# Patient Record
Sex: FEMALE | Race: WHITE | Employment: OTHER | URBAN - METROPOLITAN AREA
[De-identification: names, ages, dates, MRNs, and addresses within clinical notes are randomized per-mention and may not be internally consistent; named-entity substitution may affect disease eponyms.]

---

## 2017-07-29 ENCOUNTER — APPOINTMENT (OUTPATIENT)
Dept: CT IMAGING | Age: 82
End: 2017-07-29
Payer: MEDICARE

## 2017-07-29 ENCOUNTER — HOSPITAL ENCOUNTER (EMERGENCY)
Age: 82
Discharge: HOME | End: 2017-07-29
Attending: FAMILY MEDICINE
Payer: MEDICARE

## 2017-07-29 VITALS
RESPIRATION RATE: 16 BRPM | SYSTOLIC BLOOD PRESSURE: 148 MMHG | OXYGEN SATURATION: 97 % | BODY MASS INDEX: 25.76 KG/M2 | DIASTOLIC BLOOD PRESSURE: 71 MMHG | WEIGHT: 140 LBS | HEIGHT: 62 IN | TEMPERATURE: 98.2 F | HEART RATE: 72 BPM

## 2017-07-29 DIAGNOSIS — M47.816 OSTEOARTHRITIS OF LUMBAR SPINE, UNSPECIFIED SPINAL OSTEOARTHRITIS COMPLICATION STATUS: Primary | ICD-10-CM

## 2017-07-29 LAB
ALBUMIN SERPL-MCNC: 4 G/DL (ref 3.9–4.9)
ALP BLD-CCNC: 49 U/L (ref 40–130)
ALT SERPL-CCNC: 16 U/L (ref 0–33)
AMORPHOUS: ABNORMAL
ANION GAP SERPL CALCULATED.3IONS-SCNC: 11 MEQ/L (ref 7–13)
AST SERPL-CCNC: 21 U/L (ref 0–35)
BACTERIA: ABNORMAL /HPF
BASOPHILS ABSOLUTE: 0.1 K/UL (ref 0–0.2)
BASOPHILS RELATIVE PERCENT: 1.1 %
BILIRUB SERPL-MCNC: 0.3 MG/DL (ref 0–1.2)
BILIRUBIN URINE: NEGATIVE
BLOOD, URINE: NEGATIVE
BUN BLDV-MCNC: 13 MG/DL (ref 8–23)
CALCIUM SERPL-MCNC: 9 MG/DL (ref 8.6–10.2)
CHLORIDE BLD-SCNC: 104 MEQ/L (ref 98–107)
CLARITY: CLEAR
CO2: 28 MEQ/L (ref 22–29)
COLOR: YELLOW
CREAT SERPL-MCNC: 0.59 MG/DL (ref 0.5–0.9)
EOSINOPHILS ABSOLUTE: 0.1 K/UL (ref 0–0.7)
EOSINOPHILS RELATIVE PERCENT: 1.4 %
EPITHELIAL CELLS, UA: ABNORMAL /HPF
GFR AFRICAN AMERICAN: >60
GFR NON-AFRICAN AMERICAN: >60
GLOBULIN: 2.2 G/DL (ref 2.3–3.5)
GLUCOSE BLD-MCNC: 92 MG/DL (ref 74–109)
GLUCOSE URINE: NEGATIVE MG/DL
HCT VFR BLD CALC: 41.8 % (ref 37–47)
HEMOGLOBIN: 13.6 G/DL (ref 12–16)
KETONES, URINE: NEGATIVE MG/DL
LEUKOCYTE ESTERASE, URINE: ABNORMAL
LYMPHOCYTES ABSOLUTE: 1.8 K/UL (ref 1–4.8)
LYMPHOCYTES RELATIVE PERCENT: 30.8 %
MCH RBC QN AUTO: 31.2 PG (ref 27–31.3)
MCHC RBC AUTO-ENTMCNC: 32.6 % (ref 33–37)
MCV RBC AUTO: 95.9 FL (ref 82–100)
MONOCYTES ABSOLUTE: 0.7 K/UL (ref 0.2–0.8)
MONOCYTES RELATIVE PERCENT: 11.2 %
NEUTROPHILS ABSOLUTE: 3.3 K/UL (ref 1.4–6.5)
NEUTROPHILS RELATIVE PERCENT: 55.5 %
NITRITE, URINE: NEGATIVE
PDW BLD-RTO: 13.8 % (ref 11.5–14.5)
PH UA: 5.5 (ref 5–9)
PLATELET # BLD: 152 K/UL (ref 130–400)
POTASSIUM SERPL-SCNC: 4 MEQ/L (ref 3.5–5.1)
PROTEIN UA: NEGATIVE MG/DL
RBC # BLD: 4.36 M/UL (ref 4.2–5.4)
RBC UA: ABNORMAL /HPF (ref 0–2)
RENAL EPITHELIAL, UA: ABNORMAL /HPF
SODIUM BLD-SCNC: 143 MEQ/L (ref 132–144)
SPECIFIC GRAVITY UA: 1.01 (ref 1–1.03)
TOTAL PROTEIN: 6.2 G/DL (ref 6.4–8.1)
URINE REFLEX TO CULTURE: YES
UROBILINOGEN, URINE: 0.2 E.U./DL
WBC # BLD: 5.9 K/UL (ref 4.8–10.8)
WBC UA: ABNORMAL /HPF (ref 0–5)

## 2017-07-29 PROCEDURE — 81001 URINALYSIS AUTO W/SCOPE: CPT

## 2017-07-29 PROCEDURE — 6360000002 HC RX W HCPCS: Performed by: FAMILY MEDICINE

## 2017-07-29 PROCEDURE — 96374 THER/PROPH/DIAG INJ IV PUSH: CPT

## 2017-07-29 PROCEDURE — 80053 COMPREHEN METABOLIC PANEL: CPT

## 2017-07-29 PROCEDURE — 36415 COLL VENOUS BLD VENIPUNCTURE: CPT

## 2017-07-29 PROCEDURE — 85025 COMPLETE CBC W/AUTO DIFF WBC: CPT

## 2017-07-29 PROCEDURE — 74176 CT ABD & PELVIS W/O CONTRAST: CPT

## 2017-07-29 PROCEDURE — 99283 EMERGENCY DEPT VISIT LOW MDM: CPT

## 2017-07-29 PROCEDURE — 87086 URINE CULTURE/COLONY COUNT: CPT

## 2017-07-29 RX ORDER — OXYBUTYNIN CHLORIDE 5 MG/1
5 TABLET ORAL 2 TIMES DAILY
COMMUNITY

## 2017-07-29 RX ORDER — KETOROLAC TROMETHAMINE 30 MG/ML
30 INJECTION, SOLUTION INTRAMUSCULAR; INTRAVENOUS ONCE
Status: COMPLETED | OUTPATIENT
Start: 2017-07-29 | End: 2017-07-29

## 2017-07-29 RX ORDER — CHLORAL HYDRATE 500 MG
1000 CAPSULE ORAL DAILY
Status: ON HOLD | COMMUNITY
End: 2019-03-24

## 2017-07-29 RX ORDER — PRAVASTATIN SODIUM 40 MG
40 TABLET ORAL DAILY
COMMUNITY

## 2017-07-29 RX ORDER — MELOXICAM 7.5 MG/1
7.5 TABLET ORAL DAILY
Qty: 10 TABLET | Refills: 3 | Status: SHIPPED | OUTPATIENT
Start: 2017-07-29

## 2017-07-29 RX ORDER — ACETAMINOPHEN 500 MG
500 TABLET ORAL PRN
COMMUNITY

## 2017-07-29 RX ADMIN — KETOROLAC TROMETHAMINE 30 MG: 30 INJECTION, SOLUTION INTRAMUSCULAR at 15:05

## 2017-07-29 ASSESSMENT — PAIN SCALES - GENERAL
PAINLEVEL_OUTOF10: 9
PAINLEVEL_OUTOF10: 9
PAINLEVEL_OUTOF10: 2

## 2017-07-29 ASSESSMENT — PAIN DESCRIPTION - DESCRIPTORS: DESCRIPTORS: STABBING

## 2017-07-29 ASSESSMENT — ENCOUNTER SYMPTOMS: BACK PAIN: 1

## 2017-07-29 ASSESSMENT — PAIN DESCRIPTION - ORIENTATION: ORIENTATION: LOWER;RIGHT

## 2017-07-29 ASSESSMENT — PAIN DESCRIPTION - PAIN TYPE: TYPE: ACUTE PAIN

## 2017-07-29 ASSESSMENT — PAIN DESCRIPTION - LOCATION: LOCATION: BACK

## 2017-07-31 LAB — URINE CULTURE, ROUTINE: NORMAL

## 2017-09-19 ENCOUNTER — APPOINTMENT (OUTPATIENT)
Dept: GENERAL RADIOLOGY | Age: 82
End: 2017-09-19
Payer: MEDICARE

## 2017-09-19 ENCOUNTER — HOSPITAL ENCOUNTER (EMERGENCY)
Age: 82
Discharge: HOME | End: 2017-09-19
Payer: MEDICARE

## 2017-09-19 ENCOUNTER — APPOINTMENT (OUTPATIENT)
Dept: CT IMAGING | Age: 82
End: 2017-09-19
Payer: MEDICARE

## 2017-09-19 VITALS
HEIGHT: 62 IN | TEMPERATURE: 98.1 F | HEART RATE: 80 BPM | RESPIRATION RATE: 17 BRPM | DIASTOLIC BLOOD PRESSURE: 86 MMHG | BODY MASS INDEX: 25.76 KG/M2 | OXYGEN SATURATION: 97 % | SYSTOLIC BLOOD PRESSURE: 158 MMHG | WEIGHT: 140 LBS

## 2017-09-19 DIAGNOSIS — T15.91XA FOREIGN BODY OF RIGHT EYE, INITIAL ENCOUNTER: ICD-10-CM

## 2017-09-19 DIAGNOSIS — T07.XXXA ABRASIONS OF MULTIPLE SITES: ICD-10-CM

## 2017-09-19 DIAGNOSIS — S00.83XA FACIAL CONTUSION, INITIAL ENCOUNTER: ICD-10-CM

## 2017-09-19 DIAGNOSIS — S09.90XA CLOSED HEAD INJURY, INITIAL ENCOUNTER: Primary | ICD-10-CM

## 2017-09-19 PROCEDURE — 96374 THER/PROPH/DIAG INJ IV PUSH: CPT

## 2017-09-19 PROCEDURE — 70450 CT HEAD/BRAIN W/O DYE: CPT

## 2017-09-19 PROCEDURE — 6370000000 HC RX 637 (ALT 250 FOR IP): Performed by: PHYSICIAN ASSISTANT

## 2017-09-19 PROCEDURE — 6360000002 HC RX W HCPCS: Performed by: PHYSICIAN ASSISTANT

## 2017-09-19 PROCEDURE — 73562 X-RAY EXAM OF KNEE 3: CPT

## 2017-09-19 PROCEDURE — 99284 EMERGENCY DEPT VISIT MOD MDM: CPT

## 2017-09-19 PROCEDURE — 72125 CT NECK SPINE W/O DYE: CPT

## 2017-09-19 PROCEDURE — 96375 TX/PRO/DX INJ NEW DRUG ADDON: CPT

## 2017-09-19 RX ORDER — TRAMADOL HYDROCHLORIDE 50 MG/1
50 TABLET ORAL EVERY 6 HOURS PRN
Qty: 12 TABLET | Refills: 0 | Status: SHIPPED | OUTPATIENT
Start: 2017-09-19 | End: 2017-09-29

## 2017-09-19 RX ORDER — MORPHINE SULFATE 2 MG/ML
2 INJECTION, SOLUTION INTRAMUSCULAR; INTRAVENOUS ONCE
Status: COMPLETED | OUTPATIENT
Start: 2017-09-19 | End: 2017-09-19

## 2017-09-19 RX ORDER — GINSENG 100 MG
CAPSULE ORAL ONCE
Status: COMPLETED | OUTPATIENT
Start: 2017-09-19 | End: 2017-09-19

## 2017-09-19 RX ORDER — ONDANSETRON 2 MG/ML
4 INJECTION INTRAMUSCULAR; INTRAVENOUS ONCE
Status: COMPLETED | OUTPATIENT
Start: 2017-09-19 | End: 2017-09-19

## 2017-09-19 RX ORDER — SOFT LENS RINSE,STORE SOLUTION
1 SOLUTION, NON-ORAL MISCELLANEOUS ONCE
Status: COMPLETED | OUTPATIENT
Start: 2017-09-19 | End: 2017-09-19

## 2017-09-19 RX ORDER — TOBRAMYCIN 3 MG/ML
1 SOLUTION/ DROPS OPHTHALMIC EVERY 6 HOURS SCHEDULED
Status: DISCONTINUED | OUTPATIENT
Start: 2017-09-19 | End: 2017-09-19 | Stop reason: HOSPADM

## 2017-09-19 RX ADMIN — MORPHINE SULFATE 2 MG: 2 INJECTION, SOLUTION INTRAMUSCULAR; INTRAVENOUS at 19:52

## 2017-09-19 RX ADMIN — ONDANSETRON 4 MG: 2 INJECTION INTRAMUSCULAR; INTRAVENOUS at 19:50

## 2017-09-19 RX ADMIN — BACITRACIN: 500 OINTMENT TOPICAL at 21:01

## 2017-09-19 RX ADMIN — PURIFIED WATER 1 DROP: 99.05 SOLUTION/ DROPS OPHTHALMIC at 19:55

## 2017-09-19 RX ADMIN — TOBRAMYCIN 1 DROP: 3 SOLUTION OPHTHALMIC at 21:05

## 2017-09-19 ASSESSMENT — ENCOUNTER SYMPTOMS
BACK PAIN: 0
VOMITING: 0
NAUSEA: 1
APNEA: 0
ABDOMINAL DISTENTION: 0
EYE PAIN: 0
VOICE CHANGE: 0
PHOTOPHOBIA: 0
ANAL BLEEDING: 0
COLOR CHANGE: 1
EYE REDNESS: 0
EYE DISCHARGE: 0
EYE ITCHING: 0

## 2017-09-19 ASSESSMENT — PAIN SCALES - GENERAL
PAINLEVEL_OUTOF10: 10
PAINLEVEL_OUTOF10: 8

## 2017-09-19 ASSESSMENT — PAIN DESCRIPTION - LOCATION: LOCATION: HEAD

## 2017-09-19 ASSESSMENT — PAIN DESCRIPTION - PAIN TYPE: TYPE: ACUTE PAIN

## 2019-03-22 ENCOUNTER — HOSPITAL ENCOUNTER (OUTPATIENT)
Age: 84
Setting detail: OBSERVATION
Discharge: HOME | End: 2019-03-24
Attending: EMERGENCY MEDICINE | Admitting: INTERNAL MEDICINE
Payer: MEDICARE

## 2019-03-22 ENCOUNTER — APPOINTMENT (OUTPATIENT)
Dept: GENERAL RADIOLOGY | Age: 84
End: 2019-03-22
Payer: MEDICARE

## 2019-03-22 ENCOUNTER — APPOINTMENT (OUTPATIENT)
Dept: CT IMAGING | Age: 84
End: 2019-03-22
Payer: MEDICARE

## 2019-03-22 DIAGNOSIS — R11.2 NON-INTRACTABLE VOMITING WITH NAUSEA, UNSPECIFIED VOMITING TYPE: Primary | ICD-10-CM

## 2019-03-22 DIAGNOSIS — R19.7 DIARRHEA, UNSPECIFIED TYPE: ICD-10-CM

## 2019-03-22 DIAGNOSIS — R93.89 INFILTRATE NOTED ON IMAGING STUDY: ICD-10-CM

## 2019-03-22 DIAGNOSIS — R53.1 GENERAL WEAKNESS: ICD-10-CM

## 2019-03-22 DIAGNOSIS — R05.9 COUGH: ICD-10-CM

## 2019-03-22 LAB
ALBUMIN SERPL-MCNC: 3.9 G/DL (ref 3.5–4.6)
ALP BLD-CCNC: 63 U/L (ref 40–130)
ALT SERPL-CCNC: 17 U/L (ref 0–33)
AMYLASE: 48 U/L (ref 22–93)
ANION GAP SERPL CALCULATED.3IONS-SCNC: 13 MEQ/L (ref 9–15)
AST SERPL-CCNC: 26 U/L (ref 0–35)
BACTERIA: NEGATIVE /HPF
BILIRUB SERPL-MCNC: 0.3 MG/DL (ref 0.2–0.7)
BILIRUBIN URINE: NEGATIVE
BLOOD, URINE: NEGATIVE
BUN BLDV-MCNC: 14 MG/DL (ref 8–23)
CALCIUM SERPL-MCNC: 8.6 MG/DL (ref 8.5–9.9)
CHLORIDE BLD-SCNC: 98 MEQ/L (ref 95–107)
CLARITY: CLEAR
CO2: 26 MEQ/L (ref 20–31)
COLOR: YELLOW
CREAT SERPL-MCNC: 0.56 MG/DL (ref 0.5–0.9)
EKG ATRIAL RATE: 92 BPM
EKG P AXIS: 43 DEGREES
EKG P-R INTERVAL: 130 MS
EKG Q-T INTERVAL: 380 MS
EKG QRS DURATION: 80 MS
EKG QTC CALCULATION (BAZETT): 469 MS
EKG R AXIS: 63 DEGREES
EKG T AXIS: 231 DEGREES
EKG VENTRICULAR RATE: 92 BPM
EPITHELIAL CELLS, UA: NORMAL /HPF (ref 0–5)
GFR AFRICAN AMERICAN: >60
GFR NON-AFRICAN AMERICAN: >60
GLOBULIN: 2.7 G/DL (ref 2.3–3.5)
GLUCOSE BLD-MCNC: 96 MG/DL (ref 70–99)
GLUCOSE URINE: NEGATIVE MG/DL
HCT VFR BLD CALC: 40.4 % (ref 37–47)
HEMOGLOBIN: 13.8 G/DL (ref 12–16)
HYALINE CASTS: NORMAL /HPF (ref 0–5)
KETONES, URINE: NEGATIVE MG/DL
LACTIC ACID: 1.1 MMOL/L (ref 0.5–2.2)
LACTIC ACID: 1.2 MMOL/L (ref 0.5–2.2)
LEUKOCYTE ESTERASE, URINE: ABNORMAL
LIPASE: 29 U/L (ref 12–95)
MCH RBC QN AUTO: 31.9 PG (ref 27–31.3)
MCHC RBC AUTO-ENTMCNC: 34.1 % (ref 33–37)
MCV RBC AUTO: 93.6 FL (ref 82–100)
NITRITE, URINE: NEGATIVE
PDW BLD-RTO: 14.2 % (ref 11.5–14.5)
PH UA: 6 (ref 5–9)
PLATELET # BLD: 129 K/UL (ref 130–400)
POC CREATININE WHOLE BLOOD: 0.6
POTASSIUM SERPL-SCNC: 3.4 MEQ/L (ref 3.4–4.9)
PROCALCITONIN: 0.08 NG/ML (ref 0–0.15)
PROTEIN UA: NEGATIVE MG/DL
RAPID INFLUENZA  B AGN: NEGATIVE
RAPID INFLUENZA A AGN: POSITIVE
RBC # BLD: 4.32 M/UL (ref 4.2–5.4)
RBC UA: NORMAL /HPF (ref 0–5)
SODIUM BLD-SCNC: 137 MEQ/L (ref 135–144)
SPECIFIC GRAVITY UA: 1.02 (ref 1–1.03)
TOTAL PROTEIN: 6.6 G/DL (ref 6.3–8)
URINE REFLEX TO CULTURE: YES
UROBILINOGEN, URINE: 0.2 E.U./DL
WBC # BLD: 3.2 K/UL (ref 4.8–10.8)
WBC UA: NORMAL /HPF (ref 0–5)

## 2019-03-22 PROCEDURE — 96375 TX/PRO/DX INJ NEW DRUG ADDON: CPT

## 2019-03-22 PROCEDURE — G0378 HOSPITAL OBSERVATION PER HR: HCPCS

## 2019-03-22 PROCEDURE — 74177 CT ABD & PELVIS W/CONTRAST: CPT

## 2019-03-22 PROCEDURE — 93005 ELECTROCARDIOGRAM TRACING: CPT

## 2019-03-22 PROCEDURE — 87086 URINE CULTURE/COLONY COUNT: CPT

## 2019-03-22 PROCEDURE — 6360000002 HC RX W HCPCS: Performed by: EMERGENCY MEDICINE

## 2019-03-22 PROCEDURE — 96372 THER/PROPH/DIAG INJ SC/IM: CPT

## 2019-03-22 PROCEDURE — 85027 COMPLETE CBC AUTOMATED: CPT

## 2019-03-22 PROCEDURE — 99285 EMERGENCY DEPT VISIT HI MDM: CPT

## 2019-03-22 PROCEDURE — 83690 ASSAY OF LIPASE: CPT

## 2019-03-22 PROCEDURE — 80053 COMPREHEN METABOLIC PANEL: CPT

## 2019-03-22 PROCEDURE — 84145 PROCALCITONIN (PCT): CPT

## 2019-03-22 PROCEDURE — 36415 COLL VENOUS BLD VENIPUNCTURE: CPT

## 2019-03-22 PROCEDURE — 96361 HYDRATE IV INFUSION ADD-ON: CPT

## 2019-03-22 PROCEDURE — 82150 ASSAY OF AMYLASE: CPT

## 2019-03-22 PROCEDURE — 96365 THER/PROPH/DIAG IV INF INIT: CPT

## 2019-03-22 PROCEDURE — 6360000004 HC RX CONTRAST MEDICATION: Performed by: EMERGENCY MEDICINE

## 2019-03-22 PROCEDURE — 2580000003 HC RX 258: Performed by: EMERGENCY MEDICINE

## 2019-03-22 PROCEDURE — 6370000000 HC RX 637 (ALT 250 FOR IP): Performed by: NURSE PRACTITIONER

## 2019-03-22 PROCEDURE — 81001 URINALYSIS AUTO W/SCOPE: CPT

## 2019-03-22 PROCEDURE — 71045 X-RAY EXAM CHEST 1 VIEW: CPT

## 2019-03-22 PROCEDURE — 83605 ASSAY OF LACTIC ACID: CPT

## 2019-03-22 PROCEDURE — 87633 RESP VIRUS 12-25 TARGETS: CPT

## 2019-03-22 PROCEDURE — 2580000003 HC RX 258: Performed by: NURSE PRACTITIONER

## 2019-03-22 PROCEDURE — 87804 INFLUENZA ASSAY W/OPTIC: CPT

## 2019-03-22 PROCEDURE — 6360000002 HC RX W HCPCS: Performed by: NURSE PRACTITIONER

## 2019-03-22 PROCEDURE — 87040 BLOOD CULTURE FOR BACTERIA: CPT

## 2019-03-22 RX ORDER — ONDANSETRON 2 MG/ML
4 INJECTION INTRAMUSCULAR; INTRAVENOUS ONCE
Status: COMPLETED | OUTPATIENT
Start: 2019-03-22 | End: 2019-03-22

## 2019-03-22 RX ORDER — SODIUM CHLORIDE 0.9 % (FLUSH) 0.9 %
10 SYRINGE (ML) INJECTION PRN
Status: DISCONTINUED | OUTPATIENT
Start: 2019-03-22 | End: 2019-03-24 | Stop reason: HOSPADM

## 2019-03-22 RX ORDER — UREA 10 %
2 LOTION (ML) TOPICAL NIGHTLY PRN
Status: DISCONTINUED | OUTPATIENT
Start: 2019-03-22 | End: 2019-03-24 | Stop reason: HOSPADM

## 2019-03-22 RX ORDER — GABAPENTIN 100 MG/1
200 CAPSULE ORAL NIGHTLY
Status: DISCONTINUED | OUTPATIENT
Start: 2019-03-22 | End: 2019-03-24 | Stop reason: HOSPADM

## 2019-03-22 RX ORDER — SODIUM CHLORIDE 0.9 % (FLUSH) 0.9 %
10 SYRINGE (ML) INJECTION ONCE
Status: COMPLETED | OUTPATIENT
Start: 2019-03-22 | End: 2019-03-22

## 2019-03-22 RX ORDER — PRAVASTATIN SODIUM 40 MG
40 TABLET ORAL DAILY
Status: DISCONTINUED | OUTPATIENT
Start: 2019-03-22 | End: 2019-03-24 | Stop reason: HOSPADM

## 2019-03-22 RX ORDER — MELOXICAM 7.5 MG/1
7.5 TABLET ORAL DAILY
Status: DISCONTINUED | OUTPATIENT
Start: 2019-03-22 | End: 2019-03-24 | Stop reason: HOSPADM

## 2019-03-22 RX ORDER — ONDANSETRON 2 MG/ML
4 INJECTION INTRAMUSCULAR; INTRAVENOUS EVERY 6 HOURS
Status: DISPENSED | OUTPATIENT
Start: 2019-03-22 | End: 2019-03-23

## 2019-03-22 RX ORDER — GABAPENTIN 100 MG/1
CAPSULE ORAL
COMMUNITY
Start: 2019-01-02

## 2019-03-22 RX ORDER — OXYBUTYNIN CHLORIDE 5 MG/1
5 TABLET ORAL 2 TIMES DAILY
Status: DISCONTINUED | OUTPATIENT
Start: 2019-03-22 | End: 2019-03-24 | Stop reason: HOSPADM

## 2019-03-22 RX ORDER — 0.9 % SODIUM CHLORIDE 0.9 %
1000 INTRAVENOUS SOLUTION INTRAVENOUS ONCE
Status: COMPLETED | OUTPATIENT
Start: 2019-03-22 | End: 2019-03-22

## 2019-03-22 RX ORDER — ONDANSETRON 2 MG/ML
4 INJECTION INTRAMUSCULAR; INTRAVENOUS EVERY 6 HOURS
Status: DISCONTINUED | OUTPATIENT
Start: 2019-03-22 | End: 2019-03-22

## 2019-03-22 RX ORDER — ACETAMINOPHEN 325 MG/1
650 TABLET ORAL EVERY 4 HOURS PRN
Status: DISCONTINUED | OUTPATIENT
Start: 2019-03-22 | End: 2019-03-24 | Stop reason: HOSPADM

## 2019-03-22 RX ORDER — GUAIFENESIN 600 MG/1
600 TABLET, EXTENDED RELEASE ORAL 2 TIMES DAILY
Status: DISCONTINUED | OUTPATIENT
Start: 2019-03-22 | End: 2019-03-24 | Stop reason: HOSPADM

## 2019-03-22 RX ORDER — SODIUM CHLORIDE 9 MG/ML
INJECTION, SOLUTION INTRAVENOUS CONTINUOUS
Status: CANCELLED | OUTPATIENT
Start: 2019-03-22

## 2019-03-22 RX ORDER — SODIUM CHLORIDE 0.9 % (FLUSH) 0.9 %
10 SYRINGE (ML) INJECTION EVERY 12 HOURS SCHEDULED
Status: DISCONTINUED | OUTPATIENT
Start: 2019-03-22 | End: 2019-03-24 | Stop reason: HOSPADM

## 2019-03-22 RX ADMIN — GUAIFENESIN 600 MG: 600 TABLET, EXTENDED RELEASE ORAL at 19:53

## 2019-03-22 RX ADMIN — ENOXAPARIN SODIUM 40 MG: 40 INJECTION SUBCUTANEOUS at 19:52

## 2019-03-22 RX ADMIN — Medication 10 ML: at 20:10

## 2019-03-22 RX ADMIN — Medication 2 MG: at 19:53

## 2019-03-22 RX ADMIN — Medication 10 ML: at 20:09

## 2019-03-22 RX ADMIN — Medication 10 ML: at 12:41

## 2019-03-22 RX ADMIN — SODIUM CHLORIDE 1000 ML: 9 INJECTION, SOLUTION INTRAVENOUS at 12:16

## 2019-03-22 RX ADMIN — MELOXICAM 7.5 MG: 7.5 TABLET ORAL at 19:53

## 2019-03-22 RX ADMIN — ACETAMINOPHEN 650 MG: 325 TABLET ORAL at 19:54

## 2019-03-22 RX ADMIN — ONDANSETRON 4 MG: 2 INJECTION INTRAMUSCULAR; INTRAVENOUS at 12:16

## 2019-03-22 RX ADMIN — IOPAMIDOL 100 ML: 612 INJECTION, SOLUTION INTRAVENOUS at 12:40

## 2019-03-22 RX ADMIN — CEFTRIAXONE SODIUM 1 G: 1 INJECTION, POWDER, FOR SOLUTION INTRAMUSCULAR; INTRAVENOUS at 14:37

## 2019-03-22 RX ADMIN — PRAVASTATIN SODIUM 40 MG: 40 TABLET ORAL at 19:54

## 2019-03-22 RX ADMIN — METOPROLOL TARTRATE 25 MG: 25 TABLET ORAL at 19:53

## 2019-03-22 RX ADMIN — OXYBUTYNIN CHLORIDE 5 MG: 5 TABLET ORAL at 19:54

## 2019-03-22 RX ADMIN — GABAPENTIN 200 MG: 100 CAPSULE ORAL at 19:53

## 2019-03-22 RX ADMIN — VITAMIN D, TAB 1000IU (100/BT) 2000 UNITS: 25 TAB at 20:09

## 2019-03-22 RX ADMIN — AZITHROMYCIN MONOHYDRATE 500 MG: 500 INJECTION, POWDER, LYOPHILIZED, FOR SOLUTION INTRAVENOUS at 15:18

## 2019-03-22 ASSESSMENT — ENCOUNTER SYMPTOMS
COLOR CHANGE: 0
COUGH: 1
SHORTNESS OF BREATH: 0
PHOTOPHOBIA: 0
ABDOMINAL PAIN: 1
DIARRHEA: 1
EYE DISCHARGE: 0
FACIAL SWELLING: 0
NAUSEA: 1
WHEEZING: 0
VOMITING: 1
RHINORRHEA: 0
ABDOMINAL DISTENTION: 0

## 2019-03-22 ASSESSMENT — PAIN DESCRIPTION - LOCATION: LOCATION: ABDOMEN

## 2019-03-22 ASSESSMENT — PAIN SCALES - GENERAL
PAINLEVEL_OUTOF10: 6
PAINLEVEL_OUTOF10: 8

## 2019-03-23 LAB
ANION GAP SERPL CALCULATED.3IONS-SCNC: 14 MEQ/L (ref 9–15)
BASOPHILS ABSOLUTE: 0 K/UL (ref 0–0.2)
BASOPHILS RELATIVE PERCENT: 0.8 %
BUN BLDV-MCNC: 12 MG/DL (ref 8–23)
CALCIUM SERPL-MCNC: 8.7 MG/DL (ref 8.5–9.9)
CHLORIDE BLD-SCNC: 99 MEQ/L (ref 95–107)
CO2: 25 MEQ/L (ref 20–31)
CREAT SERPL-MCNC: 0.61 MG/DL (ref 0.5–0.9)
EOSINOPHILS ABSOLUTE: 0 K/UL (ref 0–0.7)
EOSINOPHILS RELATIVE PERCENT: 0.5 %
GFR AFRICAN AMERICAN: >60
GFR AFRICAN AMERICAN: >60
GFR NON-AFRICAN AMERICAN: >60
GFR NON-AFRICAN AMERICAN: >60
GLUCOSE BLD-MCNC: 94 MG/DL (ref 70–99)
HCT VFR BLD CALC: 39.4 % (ref 37–47)
HEMOGLOBIN: 13.4 G/DL (ref 12–16)
LYMPHOCYTES ABSOLUTE: 1.4 K/UL (ref 1–4.8)
LYMPHOCYTES RELATIVE PERCENT: 50 %
MAGNESIUM: 1.9 MG/DL (ref 1.7–2.4)
MCH RBC QN AUTO: 32.5 PG (ref 27–31.3)
MCHC RBC AUTO-ENTMCNC: 33.9 % (ref 33–37)
MCV RBC AUTO: 95.8 FL (ref 82–100)
MONOCYTES ABSOLUTE: 0.4 K/UL (ref 0.2–0.8)
MONOCYTES RELATIVE PERCENT: 14.9 %
NEUTROPHILS ABSOLUTE: 0.9 K/UL (ref 1.4–6.5)
NEUTROPHILS RELATIVE PERCENT: 35 %
PDW BLD-RTO: 14.4 % (ref 11.5–14.5)
PERFORMED ON: NORMAL
PLATELET # BLD: 141 K/UL (ref 130–400)
PLATELET SLIDE REVIEW: ADEQUATE
POC CREATININE: 0.6 MG/DL (ref 0.6–1.2)
POC SAMPLE TYPE: NORMAL
POTASSIUM REFLEX MAGNESIUM: 3.5 MEQ/L (ref 3.4–4.9)
RBC # BLD: 4.12 M/UL (ref 4.2–5.4)
SODIUM BLD-SCNC: 138 MEQ/L (ref 135–144)
WBC # BLD: 2.7 K/UL (ref 4.8–10.8)

## 2019-03-23 PROCEDURE — 6370000000 HC RX 637 (ALT 250 FOR IP): Performed by: NURSE PRACTITIONER

## 2019-03-23 PROCEDURE — 2580000003 HC RX 258: Performed by: INTERNAL MEDICINE

## 2019-03-23 PROCEDURE — 80048 BASIC METABOLIC PNL TOTAL CA: CPT

## 2019-03-23 PROCEDURE — 6370000000 HC RX 637 (ALT 250 FOR IP): Performed by: INTERNAL MEDICINE

## 2019-03-23 PROCEDURE — 6360000002 HC RX W HCPCS: Performed by: NURSE PRACTITIONER

## 2019-03-23 PROCEDURE — 36415 COLL VENOUS BLD VENIPUNCTURE: CPT

## 2019-03-23 PROCEDURE — 96361 HYDRATE IV INFUSION ADD-ON: CPT

## 2019-03-23 PROCEDURE — 2580000003 HC RX 258: Performed by: NURSE PRACTITIONER

## 2019-03-23 PROCEDURE — G0378 HOSPITAL OBSERVATION PER HR: HCPCS

## 2019-03-23 PROCEDURE — 93010 ELECTROCARDIOGRAM REPORT: CPT | Performed by: INTERNAL MEDICINE

## 2019-03-23 PROCEDURE — 97162 PT EVAL MOD COMPLEX 30 MIN: CPT

## 2019-03-23 PROCEDURE — 83735 ASSAY OF MAGNESIUM: CPT

## 2019-03-23 PROCEDURE — 85025 COMPLETE CBC W/AUTO DIFF WBC: CPT

## 2019-03-23 PROCEDURE — 96372 THER/PROPH/DIAG INJ SC/IM: CPT

## 2019-03-23 PROCEDURE — 87506 IADNA-DNA/RNA PROBE TQ 6-11: CPT

## 2019-03-23 RX ORDER — OSELTAMIVIR PHOSPHATE 75 MG/1
75 CAPSULE ORAL 2 TIMES DAILY
Status: DISCONTINUED | OUTPATIENT
Start: 2019-03-23 | End: 2019-03-24 | Stop reason: HOSPADM

## 2019-03-23 RX ORDER — SODIUM CHLORIDE, SODIUM LACTATE, POTASSIUM CHLORIDE, AND CALCIUM CHLORIDE .6; .31; .03; .02 G/100ML; G/100ML; G/100ML; G/100ML
1000 INJECTION, SOLUTION INTRAVENOUS ONCE
Status: COMPLETED | OUTPATIENT
Start: 2019-03-23 | End: 2019-03-23

## 2019-03-23 RX ADMIN — OSELTAMIVIR PHOSPHATE 75 MG: 75 CAPSULE ORAL at 10:14

## 2019-03-23 RX ADMIN — PRAVASTATIN SODIUM 40 MG: 40 TABLET ORAL at 09:01

## 2019-03-23 RX ADMIN — Medication 2 MG: at 21:26

## 2019-03-23 RX ADMIN — Medication 10 ML: at 09:01

## 2019-03-23 RX ADMIN — ACETAMINOPHEN 650 MG: 325 TABLET ORAL at 09:01

## 2019-03-23 RX ADMIN — METOPROLOL TARTRATE 25 MG: 25 TABLET ORAL at 21:26

## 2019-03-23 RX ADMIN — OSELTAMIVIR PHOSPHATE 75 MG: 75 CAPSULE ORAL at 21:26

## 2019-03-23 RX ADMIN — VITAMIN D, TAB 1000IU (100/BT) 2000 UNITS: 25 TAB at 09:01

## 2019-03-23 RX ADMIN — GUAIFENESIN 600 MG: 600 TABLET, EXTENDED RELEASE ORAL at 09:01

## 2019-03-23 RX ADMIN — Medication 10 ML: at 21:26

## 2019-03-23 RX ADMIN — MELOXICAM 7.5 MG: 7.5 TABLET ORAL at 09:01

## 2019-03-23 RX ADMIN — ENOXAPARIN SODIUM 40 MG: 40 INJECTION SUBCUTANEOUS at 21:26

## 2019-03-23 RX ADMIN — GABAPENTIN 200 MG: 100 CAPSULE ORAL at 21:26

## 2019-03-23 RX ADMIN — SODIUM CHLORIDE, POTASSIUM CHLORIDE, SODIUM LACTATE AND CALCIUM CHLORIDE 1000 ML: 600; 310; 30; 20 INJECTION, SOLUTION INTRAVENOUS at 10:14

## 2019-03-23 RX ADMIN — GUAIFENESIN 600 MG: 600 TABLET, EXTENDED RELEASE ORAL at 21:26

## 2019-03-23 RX ADMIN — ACETAMINOPHEN 650 MG: 325 TABLET ORAL at 13:44

## 2019-03-23 RX ADMIN — METOPROLOL TARTRATE 25 MG: 25 TABLET ORAL at 09:01

## 2019-03-23 RX ADMIN — OXYBUTYNIN CHLORIDE 5 MG: 5 TABLET ORAL at 09:01

## 2019-03-23 RX ADMIN — OXYBUTYNIN CHLORIDE 5 MG: 5 TABLET ORAL at 21:26

## 2019-03-23 ASSESSMENT — PAIN DESCRIPTION - LOCATION: LOCATION: HEAD

## 2019-03-23 ASSESSMENT — PAIN SCALES - GENERAL
PAINLEVEL_OUTOF10: 3
PAINLEVEL_OUTOF10: 3
PAINLEVEL_OUTOF10: 4

## 2019-03-24 ENCOUNTER — APPOINTMENT (OUTPATIENT)
Dept: GENERAL RADIOLOGY | Age: 84
End: 2019-03-24
Payer: MEDICARE

## 2019-03-24 VITALS
RESPIRATION RATE: 19 BRPM | WEIGHT: 143.1 LBS | SYSTOLIC BLOOD PRESSURE: 143 MMHG | HEART RATE: 74 BPM | TEMPERATURE: 98.8 F | DIASTOLIC BLOOD PRESSURE: 56 MMHG | BODY MASS INDEX: 26.33 KG/M2 | HEIGHT: 62 IN | OXYGEN SATURATION: 96 %

## 2019-03-24 LAB
ANION GAP SERPL CALCULATED.3IONS-SCNC: 13 MEQ/L (ref 9–15)
BASOPHILS ABSOLUTE: 0 K/UL (ref 0–0.2)
BASOPHILS RELATIVE PERCENT: 0.8 %
BUN BLDV-MCNC: 7 MG/DL (ref 8–23)
CALCIUM SERPL-MCNC: 8.8 MG/DL (ref 8.5–9.9)
CHLORIDE BLD-SCNC: 102 MEQ/L (ref 95–107)
CO2: 23 MEQ/L (ref 20–31)
CREAT SERPL-MCNC: 0.46 MG/DL (ref 0.5–0.9)
EOSINOPHILS ABSOLUTE: 0 K/UL (ref 0–0.7)
EOSINOPHILS RELATIVE PERCENT: 0.3 %
GFR AFRICAN AMERICAN: >60
GFR NON-AFRICAN AMERICAN: >60
GI BACTERIAL PATHOGENS BY PCR: NORMAL
GLUCOSE BLD-MCNC: 90 MG/DL (ref 70–99)
HCT VFR BLD CALC: 41 % (ref 37–47)
HEMOGLOBIN: 13.6 G/DL (ref 12–16)
LYMPHOCYTES ABSOLUTE: 1.6 K/UL (ref 1–4.8)
LYMPHOCYTES RELATIVE PERCENT: 60 %
MCH RBC QN AUTO: 31.7 PG (ref 27–31.3)
MCHC RBC AUTO-ENTMCNC: 33.3 % (ref 33–37)
MCV RBC AUTO: 95.1 FL (ref 82–100)
MONOCYTES ABSOLUTE: 0.4 K/UL (ref 0.2–0.8)
MONOCYTES RELATIVE PERCENT: 16.2 %
NEUTROPHILS ABSOLUTE: 0.6 K/UL (ref 1.4–6.5)
NEUTROPHILS RELATIVE PERCENT: 22.7 %
PDW BLD-RTO: 14 % (ref 11.5–14.5)
PLATELET # BLD: 115 K/UL (ref 130–400)
POTASSIUM REFLEX MAGNESIUM: 3.8 MEQ/L (ref 3.4–4.9)
RBC # BLD: 4.31 M/UL (ref 4.2–5.4)
SODIUM BLD-SCNC: 138 MEQ/L (ref 135–144)
URINE CULTURE, ROUTINE: NORMAL
WBC # BLD: 2.7 K/UL (ref 4.8–10.8)

## 2019-03-24 PROCEDURE — 6370000000 HC RX 637 (ALT 250 FOR IP): Performed by: INTERNAL MEDICINE

## 2019-03-24 PROCEDURE — G0378 HOSPITAL OBSERVATION PER HR: HCPCS

## 2019-03-24 PROCEDURE — 71046 X-RAY EXAM CHEST 2 VIEWS: CPT

## 2019-03-24 PROCEDURE — 96372 THER/PROPH/DIAG INJ SC/IM: CPT

## 2019-03-24 PROCEDURE — 97116 GAIT TRAINING THERAPY: CPT

## 2019-03-24 PROCEDURE — 80048 BASIC METABOLIC PNL TOTAL CA: CPT

## 2019-03-24 PROCEDURE — 36415 COLL VENOUS BLD VENIPUNCTURE: CPT

## 2019-03-24 PROCEDURE — 2580000003 HC RX 258: Performed by: NURSE PRACTITIONER

## 2019-03-24 PROCEDURE — 6370000000 HC RX 637 (ALT 250 FOR IP): Performed by: NURSE PRACTITIONER

## 2019-03-24 PROCEDURE — 6360000002 HC RX W HCPCS: Performed by: NURSE PRACTITIONER

## 2019-03-24 PROCEDURE — 97165 OT EVAL LOW COMPLEX 30 MIN: CPT

## 2019-03-24 PROCEDURE — 85025 COMPLETE CBC W/AUTO DIFF WBC: CPT

## 2019-03-24 RX ORDER — OSELTAMIVIR PHOSPHATE 75 MG/1
75 CAPSULE ORAL 2 TIMES DAILY
Qty: 10 CAPSULE | Refills: 0 | Status: SHIPPED | OUTPATIENT
Start: 2019-03-24 | End: 2019-03-29

## 2019-03-24 RX ADMIN — MELOXICAM 7.5 MG: 7.5 TABLET ORAL at 09:50

## 2019-03-24 RX ADMIN — Medication 10 ML: at 09:51

## 2019-03-24 RX ADMIN — METOPROLOL TARTRATE 25 MG: 25 TABLET ORAL at 09:50

## 2019-03-24 RX ADMIN — OSELTAMIVIR PHOSPHATE 75 MG: 75 CAPSULE ORAL at 09:49

## 2019-03-24 RX ADMIN — OXYBUTYNIN CHLORIDE 5 MG: 5 TABLET ORAL at 09:50

## 2019-03-24 RX ADMIN — ENOXAPARIN SODIUM 40 MG: 40 INJECTION SUBCUTANEOUS at 09:50

## 2019-03-24 RX ADMIN — PRAVASTATIN SODIUM 40 MG: 40 TABLET ORAL at 09:50

## 2019-03-24 RX ADMIN — VITAMIN D, TAB 1000IU (100/BT) 2000 UNITS: 25 TAB at 09:49

## 2019-03-24 RX ADMIN — GUAIFENESIN 600 MG: 600 TABLET, EXTENDED RELEASE ORAL at 09:51

## 2019-03-24 ASSESSMENT — PAIN SCALES - GENERAL: PAINLEVEL_OUTOF10: 5

## 2019-03-26 LAB
ADENOVIRUS SPECIES BE: NOT DETECTED
ADENOVIRUS SPECIES C: NOT DETECTED
HUMAN METAPNEUMOVIRUS PCR: NOT DETECTED
INFLUENZA A BY PCR: DETECTED
INFLUENZA A H1 (2009) PCR: NOT DETECTED
INFLUENZA A H1 (2009) PCR: NOT DETECTED
INFLUENZA A H3 PCR: DETECTED
INFLUENZA B BY PCR: NOT DETECTED
PARAINFLUENZA 2: NOT DETECTED
PARAINFLUENZA 3: NOT DETECTED
PARAINFLUENZA1: NOT DETECTED
RHINOVIRUS RNA XXX PCR: NOT DETECTED
RSV A AB BY PCR: NOT DETECTED
RSV B AB BY PCR: NOT DETECTED
RVP SOURCE: ABNORMAL

## 2019-03-28 LAB
BLOOD CULTURE, ROUTINE: NORMAL
CULTURE, BLOOD 2: NORMAL

## 2020-10-13 ENCOUNTER — APPOINTMENT (OUTPATIENT)
Dept: CT IMAGING | Age: 85
End: 2020-10-13
Payer: MEDICARE

## 2020-10-13 ENCOUNTER — HOSPITAL ENCOUNTER (EMERGENCY)
Age: 85
Discharge: HOME | End: 2020-10-13
Attending: STUDENT IN AN ORGANIZED HEALTH CARE EDUCATION/TRAINING PROGRAM
Payer: MEDICARE

## 2020-10-13 VITALS
RESPIRATION RATE: 18 BRPM | SYSTOLIC BLOOD PRESSURE: 122 MMHG | WEIGHT: 140 LBS | HEART RATE: 68 BPM | OXYGEN SATURATION: 100 % | HEIGHT: 64 IN | DIASTOLIC BLOOD PRESSURE: 88 MMHG | BODY MASS INDEX: 23.9 KG/M2 | TEMPERATURE: 98.4 F

## 2020-10-13 LAB
ALBUMIN SERPL-MCNC: 4.1 G/DL (ref 3.5–4.6)
ALP BLD-CCNC: 54 U/L (ref 40–130)
ALT SERPL-CCNC: 14 U/L (ref 0–33)
ANION GAP SERPL CALCULATED.3IONS-SCNC: 7 MEQ/L (ref 9–15)
APTT: 28.4 SEC (ref 24.4–36.8)
AST SERPL-CCNC: 23 U/L (ref 0–35)
BASOPHILS ABSOLUTE: 0 K/UL (ref 0–0.2)
BASOPHILS RELATIVE PERCENT: 0.7 %
BILIRUB SERPL-MCNC: 0.4 MG/DL (ref 0.2–0.7)
BILIRUBIN URINE: NEGATIVE
BLOOD, URINE: NEGATIVE
BUN BLDV-MCNC: 18 MG/DL (ref 8–23)
CALCIUM SERPL-MCNC: 9.2 MG/DL (ref 8.5–9.9)
CHLORIDE BLD-SCNC: 98 MEQ/L (ref 95–107)
CLARITY: CLEAR
CO2: 28 MEQ/L (ref 20–31)
COLOR: YELLOW
CREAT SERPL-MCNC: 0.62 MG/DL (ref 0.5–0.9)
EOSINOPHILS ABSOLUTE: 0.1 K/UL (ref 0–0.7)
EOSINOPHILS RELATIVE PERCENT: 1.1 %
GFR AFRICAN AMERICAN: >60
GFR AFRICAN AMERICAN: >60
GFR NON-AFRICAN AMERICAN: >60
GFR NON-AFRICAN AMERICAN: >60
GLOBULIN: 2 G/DL (ref 2.3–3.5)
GLUCOSE BLD-MCNC: 157 MG/DL (ref 70–99)
GLUCOSE URINE: NEGATIVE MG/DL
HCT VFR BLD CALC: 41.7 % (ref 37–47)
HEMOGLOBIN: 13.8 G/DL (ref 12–16)
INR BLD: 1
KETONES, URINE: NEGATIVE MG/DL
LACTIC ACID: 2.5 MMOL/L (ref 0.5–2.2)
LEUKOCYTE ESTERASE, URINE: NEGATIVE
LIPASE: 21 U/L (ref 12–95)
LYMPHOCYTES ABSOLUTE: 1.7 K/UL (ref 1–4.8)
LYMPHOCYTES RELATIVE PERCENT: 30.5 %
MCH RBC QN AUTO: 32 PG (ref 27–31.3)
MCHC RBC AUTO-ENTMCNC: 33 % (ref 33–37)
MCV RBC AUTO: 97 FL (ref 82–100)
MONOCYTES ABSOLUTE: 0.2 K/UL (ref 0.2–0.8)
MONOCYTES RELATIVE PERCENT: 4.2 %
NEUTROPHILS ABSOLUTE: 3.4 K/UL (ref 1.4–6.5)
NEUTROPHILS RELATIVE PERCENT: 63.5 %
NITRITE, URINE: NEGATIVE
PDW BLD-RTO: 14 % (ref 11.5–14.5)
PERFORMED ON: NORMAL
PH UA: 5 (ref 5–9)
PLATELET # BLD: 153 K/UL (ref 130–400)
POC CREATININE WHOLE BLOOD: 0.6
POC CREATININE: 0.6 MG/DL (ref 0.6–1.2)
POC SAMPLE TYPE: NORMAL
POTASSIUM SERPL-SCNC: 3.9 MEQ/L (ref 3.4–4.9)
PROTEIN UA: NEGATIVE MG/DL
PROTHROMBIN TIME: 13.4 SEC (ref 12.3–14.9)
RBC # BLD: 4.3 M/UL (ref 4.2–5.4)
REASON FOR REJECTION: NORMAL
REJECTED TEST: NORMAL
SODIUM BLD-SCNC: 133 MEQ/L (ref 135–144)
SPECIFIC GRAVITY UA: 1.01 (ref 1–1.03)
TOTAL CK: 59 U/L (ref 0–170)
TOTAL PROTEIN: 6.1 G/DL (ref 6.3–8)
URINE REFLEX TO CULTURE: NORMAL
UROBILINOGEN, URINE: 1 E.U./DL
WBC # BLD: 5.4 K/UL (ref 4.8–10.8)

## 2020-10-13 PROCEDURE — 81003 URINALYSIS AUTO W/O SCOPE: CPT

## 2020-10-13 PROCEDURE — 99284 EMERGENCY DEPT VISIT MOD MDM: CPT

## 2020-10-13 PROCEDURE — 36415 COLL VENOUS BLD VENIPUNCTURE: CPT

## 2020-10-13 PROCEDURE — 80053 COMPREHEN METABOLIC PANEL: CPT

## 2020-10-13 PROCEDURE — 83690 ASSAY OF LIPASE: CPT

## 2020-10-13 PROCEDURE — 85730 THROMBOPLASTIN TIME PARTIAL: CPT

## 2020-10-13 PROCEDURE — 2580000003 HC RX 258: Performed by: STUDENT IN AN ORGANIZED HEALTH CARE EDUCATION/TRAINING PROGRAM

## 2020-10-13 PROCEDURE — 83605 ASSAY OF LACTIC ACID: CPT

## 2020-10-13 PROCEDURE — 99285 EMERGENCY DEPT VISIT HI MDM: CPT

## 2020-10-13 PROCEDURE — 82550 ASSAY OF CK (CPK): CPT

## 2020-10-13 PROCEDURE — 6360000004 HC RX CONTRAST MEDICATION: Performed by: STUDENT IN AN ORGANIZED HEALTH CARE EDUCATION/TRAINING PROGRAM

## 2020-10-13 PROCEDURE — 74177 CT ABD & PELVIS W/CONTRAST: CPT

## 2020-10-13 PROCEDURE — 6370000000 HC RX 637 (ALT 250 FOR IP): Performed by: STUDENT IN AN ORGANIZED HEALTH CARE EDUCATION/TRAINING PROGRAM

## 2020-10-13 PROCEDURE — 85610 PROTHROMBIN TIME: CPT

## 2020-10-13 PROCEDURE — 85025 COMPLETE CBC W/AUTO DIFF WBC: CPT

## 2020-10-13 RX ORDER — SULFAMETHOXAZOLE AND TRIMETHOPRIM 800; 160 MG/1; MG/1
1 TABLET ORAL 2 TIMES DAILY
Qty: 14 TABLET | Refills: 0 | Status: SHIPPED | OUTPATIENT
Start: 2020-10-13 | End: 2020-10-20

## 2020-10-13 RX ORDER — 0.9 % SODIUM CHLORIDE 0.9 %
500 INTRAVENOUS SOLUTION INTRAVENOUS ONCE
Status: COMPLETED | OUTPATIENT
Start: 2020-10-13 | End: 2020-10-13

## 2020-10-13 RX ORDER — LANOLIN ALCOHOL/MO/W.PET/CERES
400 CREAM (GRAM) TOPICAL DAILY
Status: DISCONTINUED | OUTPATIENT
Start: 2020-10-13 | End: 2020-10-13 | Stop reason: HOSPADM

## 2020-10-13 RX ORDER — DICYCLOMINE HYDROCHLORIDE 10 MG/1
10 CAPSULE ORAL EVERY 6 HOURS PRN
Qty: 20 CAPSULE | Refills: 0 | Status: SHIPPED | OUTPATIENT
Start: 2020-10-13

## 2020-10-13 RX ORDER — ONDANSETRON 4 MG/1
4 TABLET, ORALLY DISINTEGRATING ORAL EVERY 8 HOURS PRN
Qty: 3 TABLET | Refills: 0 | Status: SHIPPED | OUTPATIENT
Start: 2020-10-13

## 2020-10-13 RX ORDER — SULFAMETHOXAZOLE AND TRIMETHOPRIM 800; 160 MG/1; MG/1
1 TABLET ORAL ONCE
Status: COMPLETED | OUTPATIENT
Start: 2020-10-13 | End: 2020-10-13

## 2020-10-13 RX ORDER — METRONIDAZOLE 500 MG/1
500 TABLET ORAL ONCE
Status: COMPLETED | OUTPATIENT
Start: 2020-10-13 | End: 2020-10-13

## 2020-10-13 RX ORDER — METRONIDAZOLE 500 MG/1
500 TABLET ORAL 3 TIMES DAILY
Qty: 30 TABLET | Refills: 0 | Status: SHIPPED | OUTPATIENT
Start: 2020-10-13 | End: 2020-10-23

## 2020-10-13 RX ADMIN — SULFAMETHOXAZOLE AND TRIMETHOPRIM 1 TABLET: 800; 160 TABLET ORAL at 16:21

## 2020-10-13 RX ADMIN — Medication 400 MG: at 15:29

## 2020-10-13 RX ADMIN — SODIUM CHLORIDE 500 ML: 9 INJECTION, SOLUTION INTRAVENOUS at 15:21

## 2020-10-13 RX ADMIN — METRONIDAZOLE 500 MG: 500 TABLET ORAL at 16:21

## 2020-10-13 RX ADMIN — IOPAMIDOL 100 ML: 612 INJECTION, SOLUTION INTRAVENOUS at 15:00

## 2020-10-13 ASSESSMENT — ENCOUNTER SYMPTOMS
SINUS PRESSURE: 0
CHEST TIGHTNESS: 0
TROUBLE SWALLOWING: 0
CONSTIPATION: 1
VOMITING: 0
NAUSEA: 0
ABDOMINAL PAIN: 1
COUGH: 0
DIARRHEA: 0
BACK PAIN: 0
SHORTNESS OF BREATH: 0

## 2020-10-13 ASSESSMENT — PAIN DESCRIPTION - LOCATION: LOCATION: ABDOMEN

## 2020-10-13 ASSESSMENT — PAIN DESCRIPTION - FREQUENCY: FREQUENCY: INTERMITTENT

## 2020-10-13 ASSESSMENT — PAIN DESCRIPTION - DESCRIPTORS: DESCRIPTORS: ACHING

## 2020-10-13 ASSESSMENT — PAIN DESCRIPTION - PAIN TYPE: TYPE: ACUTE PAIN

## 2020-10-13 ASSESSMENT — PAIN DESCRIPTION - ORIENTATION: ORIENTATION: RIGHT;LEFT

## 2020-10-13 ASSESSMENT — PAIN SCALES - WONG BAKER: WONGBAKER_NUMERICALRESPONSE: 0

## 2020-10-13 ASSESSMENT — PAIN SCALES - GENERAL: PAINLEVEL_OUTOF10: 8

## 2020-10-13 NOTE — ED PROVIDER NOTES
[facility] [facility] ED  eMERGENCY dEPARTMENT eNCOUnter      Pt Name: Shiv Orozco  MRN: [id number]  [place] 1935  Date of evaluation: 10/13/2020  Provider: Velvet Silva DO    CHIEF COMPLAINT       Chief Complaint   Patient presents with    Abdominal Pain     for couple days. constipation and diarrhea. HISTORY OF PRESENT ILLNESS   (Location/Symptom, Timing/Onset,Context/Setting, Quality, Duration, Modifying Factors, Severity)  Note limiting factors. Shiv Orozco is a 80 y.o. female who presents to the emergency department with c/o lower abdominal pain off and on x 2 to 3 days. Patient denies any vomiting. Patient denies any fever, chills, cough, loss of smell, or loss of taste. Patient has pain to the left lower quadrant. Patient states she had a partial colectomy in the past.  Patient states this is concerned her because it is the same area where she had previously had had surgery. Pain is described as dull and achy in quality. Patient states she is alternated between constipation and loose stools. Patient denies any blood or mucus in her stools but does admit that she did not look at or examine her stools. Patient states that there is no chyna blood in her toilet bowl. The history is provided by the patient. NursingNotes were reviewed. REVIEW OF SYSTEMS    (2-9 systems for level 4, 10 or more for level 5)     Review of Systems   Constitutional: Negative for activity change, appetite change, chills, fever and unexpected weight change. HENT: Negative for drooling, ear pain, nosebleeds, sinus pressure and trouble swallowing. Respiratory: Negative for cough, chest tightness and shortness of breath. Cardiovascular: Negative for chest pain and leg swelling. Gastrointestinal: Positive for abdominal pain and constipation. Negative for diarrhea, nausea and vomiting. Endocrine: Negative for polydipsia and polyphagia.    Genitourinary: Negative for dysuria, flank pain and frequency. Musculoskeletal: Negative for back pain and myalgias. Skin: Negative for pallor and rash. Neurological: Negative for syncope, weakness and headaches. Hematological: Does not bruise/bleed easily. All other systems reviewed and are negative. Except as noted above the remainder of the review of systems was reviewed and negative. PAST MEDICAL HISTORY     Past Medical History:   Diagnosis Date    Cancer [facility]) 1999    bladder    Hyperlipidemia     Hypertension          SURGICALHISTORY       Past Surgical History:   Procedure Laterality Date    ABDOMEN SURGERY      large intestine partial removal    APPENDECTOMY      CHOLECYSTECTOMY      HYSTERECTOMY           CURRENT MEDICATIONS       Previous Medications    ACETAMINOPHEN (TYLENOL) 500 MG TABLET    Take 500 mg by mouth as needed for Pain    CHOLECALCIFEROL 2000 UNITS CAPS    Take one tab PO QD    DIPHENHYDRAMINE (SIMPLY SLEEP) 25 MG TABLET    Take by mouth    GABAPENTIN (NEURONTIN) 100 MG CAPSULE    1 tab in the morning and 2 tabs at bedtime    MELOXICAM (MOBIC) 7.5 MG TABLET    Take 1 tablet by mouth daily    METOPROLOL TARTRATE (LOPRESSOR) 25 MG TABLET    Take 25 mg by mouth 2 times daily    NONFORMULARY    Indications: Instaflex daily    NONFORMULARY    Indications: Sleep Aid nightly    OXYBUTYNIN (DITROPAN) 5 MG TABLET    Take 5 mg by mouth 2 times daily    PRAVASTATIN (PRAVACHOL) 40 MG TABLET    Take 40 mg by mouth daily       ALLERGIES     Phenazopyridine    FAMILY HISTORY     History reviewed. No pertinent family history.        SOCIAL HISTORY       Social History     Socioeconomic History    Marital status:      Spouse name: None    Number of children: None    Years of education: None    Highest education level: None   Occupational History    None   Social Needs    Financial resource strain: None    Food insecurity     Worry: None     Inability: None    Transportation needs     Medical: None     Non-medical: None   Tobacco Use    Smoking status: Never Smoker   Substance and Sexual Activity    Alcohol use: No    Drug use: No    Sexual activity: None   Lifestyle    Physical activity     Days per week: None     Minutes per session: None    Stress: None   Relationships    Social connections     Talks on phone: None     Gets together: None     Attends Anabaptism service: None     Active member of club or organization: None     Attends meetings of clubs or organizations: None     Relationship status: None    Intimate partner violence     Fear of current or ex partner: None     Emotionally abused: None     Physically abused: None     Forced sexual activity: None   Other Topics Concern    None   Social History Narrative    None       SCREENINGS      @FLOW([id number])@      PHYSICAL EXAM    (up to 7 for level 4, 8 or more for level 5)     ED Triage Vitals   BP Temp Temp Source Pulse Resp SpO2 Height Weight   10/13/20 1346 10/13/20 1315 10/13/20 1315 10/13/20 1315 10/13/20 1315 10/13/20 1315 10/13/20 1315 10/13/20 1315   (!) 142/77 98.4 °F (36.9 °C) Oral 75 20 100 % 5' 4\" (1.626 m) 140 lb (63.5 kg)       Physical Exam  Vitals signs and nursing note reviewed. Constitutional:       General: She is awake. She is not in acute distress. Appearance: Normal appearance. She is well-developed and normal weight. She is not ill-appearing, toxic-appearing or diaphoretic. Comments: No photophobia. No phonophobia. HENT:      Head: Normocephalic and atraumatic. No Saab's sign. Right Ear: External ear normal.      Left Ear: External ear normal.      Nose: Nose normal. No congestion or rhinorrhea. Mouth/Throat:      Mouth: Mucous membranes are moist.      Pharynx: Oropharynx is clear. No oropharyngeal exudate or posterior oropharyngeal erythema. Eyes:      General: No scleral icterus. Right eye: No foreign body or discharge. Left eye: No discharge.       Extraocular Movements: Extraocular movements intact. Conjunctiva/sclera: Conjunctivae normal.      Left eye: No exudate. Pupils: Pupils are equal, round, and reactive to light. Neck:      Musculoskeletal: Normal range of motion and neck supple. No neck rigidity. Vascular: No JVD. Trachea: No tracheal deviation. Comments: No meningismus. Cardiovascular:      Rate and Rhythm: Normal rate and regular rhythm. Heart sounds: Normal heart sounds. Heart sounds not distant. No murmur. No friction rub. No gallop. Pulmonary:      Effort: Pulmonary effort is normal. No respiratory distress. Breath sounds: Normal breath sounds. No stridor. No wheezing, rhonchi or rales. Chest:      Chest wall: No tenderness. Abdominal:      General: Abdomen is flat. Bowel sounds are normal. There is no distension or abdominal bruit. There are no signs of injury. Palpations: Abdomen is soft. There is no shifting dullness, fluid wave, hepatomegaly, splenomegaly, mass or pulsatile mass. Tenderness: There is abdominal tenderness in the left lower quadrant. There is guarding. There is no right CVA tenderness, left CVA tenderness or rebound. Negative signs include Rovsing's sign. Hernia: No hernia is present. Musculoskeletal: Normal range of motion. General: No swelling, tenderness, deformity or signs of injury. Lymphadenopathy:      Head:      Right side of head: No submental adenopathy. Left side of head: No submental adenopathy. Skin:     General: Skin is warm and dry. Capillary Refill: Capillary refill takes less than 2 seconds. Coloration: Skin is not jaundiced or pale. Findings: No bruising, erythema, lesion or rash. Neurological:      General: No focal deficit present. Mental Status: She is alert and oriented to person, place, and time. Mental status is at baseline. Cranial Nerves: No cranial nerve deficit. Sensory: No sensory deficit. Motor: No weakness. Coordination: Coordination normal.      Deep Tendon Reflexes: Reflexes are normal and symmetric. Psychiatric:         Mood and Affect: Mood normal.         Behavior: Behavior normal. Behavior is cooperative. Thought Content: Thought content normal.         Judgment: Judgment normal.         DIAGNOSTIC RESULTS     EKG: All EKG's are interpreted by the Emergency Department Physician who either signs or Co-signsthis chart in the absence of a cardiologist.        RADIOLOGY:   Gregory Horn such as CT, Ultrasound and MRI are read by the radiologist. Plain radiographic images are visualized and preliminarily interpreted by the emergency physician with the below findings:        Interpretation per the Radiologist below, if available at the time ofthis note:    CT ABDOMEN PELVIS W IV CONTRAST Additional Contrast? None   Final Result   Impression:        1. Mild descending colon wall thickening with mild pericolonic stranding, suspect mild colitis; appearance may be exaggerated due to underdistention. 2. Diverticulosis.                ED BEDSIDE ULTRASOUND:   Performed by ED Physician - none    LABS:  Labs Reviewed   CBC WITH AUTO DIFFERENTIAL - Abnormal; Notable for the following components:       Result Value    MCH 32.0 (*)     All other components within normal limits   COMPREHENSIVE METABOLIC PANEL - Abnormal; Notable for the following components:    Sodium 133 (*)     Anion Gap 7 (*)     Glucose 157 (*)     Total Protein 6.1 (*)     Globulin 2.0 (*)     All other components within normal limits   LACTIC ACID, PLASMA - Abnormal; Notable for the following components:    Lactic Acid 2.5 (*)     All other components within normal limits   POCT CREATININE - URINE - Normal   LIPASE   CK   URINE RT REFLEX TO CULTURE   SPECIMEN REJECTION   PROTIME-INR    Narrative:     redraw   APTT    Narrative:     redraw   POCT VENOUS       All other labs were within normal range or not returned as of this dictation. EMERGENCY DEPARTMENT COURSE and DIFFERENTIAL DIAGNOSIS/MDM:   Vitals:    Vitals:    10/13/20 1315 10/13/20 1346 10/13/20 1446 10/13/20 1544   BP:  (!) 142/77 118/62 122/88   Pulse: 75 77 71 68   Resp: 20 18 20 18   Temp: 98.4 °F (36.9 °C)      TempSrc: Oral      SpO2: 100% 99% 100% 100%   Weight: 140 lb (63.5 kg)      Height: 5' 4\" (1.626 m)              MDM  Patient was ordered Bactrim and Flagyl after Farm consult with Anthony Isabel the clinical pharmacist.  Patient has descending colitis which accounts for her symptoms. On reexamination the ER physician sat down explained the findings to the patient and her daughter. They verbalized understanding the care and of no further questions. CONSULTS:  None    PROCEDURES:  Unless otherwise noted below, none     Procedures    FINAL IMPRESSION      1. Acute colitis    2. Abdominal pain, left lower quadrant          DISPOSITION/PLAN   DISPOSITION        PATIENT REFERRED TO:  Damaris Wilcox MD  [address] [place] [phone]    Schedule an appointment as soon as possible for a visit in 1 day      Keyon Bella MD  [address] [place] [address]     Call in 1 day  To arrange a follow up appointment for colitis.       DISCHARGE MEDICATIONS:  New Prescriptions    DICYCLOMINE (BENTYL) 10 MG CAPSULE    Take 1 capsule by mouth every 6 hours as needed (cramps)    METRONIDAZOLE (FLAGYL) 500 MG TABLET    Take 1 tablet by mouth 3 times daily for 10 days    ONDANSETRON (ZOFRAN ODT) 4 MG DISINTEGRATING TABLET    Take 1 tablet by mouth every 8 hours as needed for Nausea    SULFAMETHOXAZOLE-TRIMETHOPRIM (BACTRIM DS) 800-160 MG PER TABLET    Take 1 tablet by mouth 2 times daily for 7 days          (Please note that portions of this note were completed with a voice recognition program.  Efforts were made to edit the dictations but occasionally words are mis-transcribed.)    Kasey Rushing DO (electronically signed)  Attending Emergency Physician          [address] [facility], DO  10/13/20 2517

## 2020-10-13 NOTE — ED NOTES
Patient requesting medication for leg cramps. Dr. Paola Rock made aware and patient medicated per order. Tolerated well. Given ice water which she tolerated well.       Ida Martinez RN  10/13/20 6417

## 2020-10-13 NOTE — ED NOTES
Patient tolerated amb to bathroom in room. Clear yellow urine noted and sent to lab.       Sreedhar Padilla RN  10/13/20 6939

## 2020-10-13 NOTE — ED TRIAGE NOTES
Patient arrived from home via [facility] with abd pain, diarrhea, and constipation. Patient A&OX4. Skin pink, warm, and dry. msp intact. No distress noted. Upon arrival patient had large bowel movement with decrease in pain of LLQ. Patient denies sob, nausea, vomiting, diarrhea, and blurred vision.

## 2020-10-13 NOTE — ED NOTES
Bed: 03  Expected date: 10/13/20  Expected time: 12:35 PM  Means of arrival: [facility]  Comments:  80 F - abd pain.  174/60,90,96% RA, 24. OT [address]  10/13/20 1419

## 2022-11-05 ENCOUNTER — APPOINTMENT (OUTPATIENT)
Dept: CT IMAGING | Age: 87
DRG: 335 | End: 2022-11-05
Payer: MEDICARE

## 2022-11-05 ENCOUNTER — HOSPITAL ENCOUNTER (INPATIENT)
Age: 87
LOS: 6 days | Discharge: NURSING, REHAB OR LONG-TERM CARE | DRG: 335 | End: 2022-11-11
Attending: EMERGENCY MEDICINE | Admitting: INTERNAL MEDICINE
Payer: MEDICARE

## 2022-11-05 DIAGNOSIS — K56.609 SMALL BOWEL OBSTRUCTION (HCC): Primary | ICD-10-CM

## 2022-11-05 LAB
ALBUMIN SERPL-MCNC: 4.5 G/DL (ref 3.5–4.6)
ALP BLD-CCNC: 67 U/L (ref 40–130)
ALT SERPL-CCNC: 17 U/L (ref 0–33)
ANION GAP SERPL CALCULATED.3IONS-SCNC: 15 MEQ/L (ref 9–15)
AST SERPL-CCNC: 21 U/L (ref 0–35)
BASOPHILS ABSOLUTE: 0 K/UL (ref 0–0.2)
BASOPHILS RELATIVE PERCENT: 0.4 %
BILIRUB SERPL-MCNC: 0.6 MG/DL (ref 0.2–0.7)
BUN BLDV-MCNC: 17 MG/DL (ref 8–23)
CALCIUM SERPL-MCNC: 10 MG/DL (ref 8.5–9.9)
CHLORIDE BLD-SCNC: 104 MEQ/L (ref 95–107)
CO2: 25 MEQ/L (ref 20–31)
CREAT SERPL-MCNC: 0.62 MG/DL (ref 0.5–0.9)
EOSINOPHILS ABSOLUTE: 0 K/UL (ref 0–0.7)
EOSINOPHILS RELATIVE PERCENT: 0.1 %
GFR SERPL CREATININE-BSD FRML MDRD: >60 ML/MIN/{1.73_M2}
GLOBULIN: 2.6 G/DL (ref 2.3–3.5)
GLUCOSE BLD-MCNC: 145 MG/DL (ref 70–99)
HCT VFR BLD CALC: 43.4 % (ref 37–47)
HEMOGLOBIN: 14.3 G/DL (ref 12–16)
LACTIC ACID: 1.6 MMOL/L (ref 0.5–2.2)
LIPASE: 18 U/L (ref 12–95)
LYMPHOCYTES ABSOLUTE: 0.8 K/UL (ref 1–4.8)
LYMPHOCYTES RELATIVE PERCENT: 11.3 %
MAGNESIUM: 2.1 MG/DL (ref 1.7–2.4)
MCH RBC QN AUTO: 31.9 PG (ref 27–31.3)
MCHC RBC AUTO-ENTMCNC: 32.9 % (ref 33–37)
MCV RBC AUTO: 96.9 FL (ref 79.4–94.8)
MONOCYTES ABSOLUTE: 0.4 K/UL (ref 0.2–0.8)
MONOCYTES RELATIVE PERCENT: 6.7 %
NEUTROPHILS ABSOLUTE: 5.5 K/UL (ref 1.4–6.5)
NEUTROPHILS RELATIVE PERCENT: 81.5 %
PDW BLD-RTO: 13.7 % (ref 11.5–14.5)
PLATELET # BLD: 150 K/UL (ref 130–400)
POTASSIUM SERPL-SCNC: 4 MEQ/L (ref 3.4–4.9)
RBC # BLD: 4.48 M/UL (ref 4.2–5.4)
SODIUM BLD-SCNC: 144 MEQ/L (ref 135–144)
TOTAL PROTEIN: 7.1 G/DL (ref 6.3–8)
TROPONIN: <0.01 NG/ML (ref 0–0.01)
WBC # BLD: 6.7 K/UL (ref 4.8–10.8)

## 2022-11-05 PROCEDURE — 83605 ASSAY OF LACTIC ACID: CPT

## 2022-11-05 PROCEDURE — 87086 URINE CULTURE/COLONY COUNT: CPT

## 2022-11-05 PROCEDURE — 83735 ASSAY OF MAGNESIUM: CPT

## 2022-11-05 PROCEDURE — 80053 COMPREHEN METABOLIC PANEL: CPT

## 2022-11-05 PROCEDURE — 74177 CT ABD & PELVIS W/CONTRAST: CPT

## 2022-11-05 PROCEDURE — 99285 EMERGENCY DEPT VISIT HI MDM: CPT

## 2022-11-05 PROCEDURE — 1210000000 HC MED SURG R&B

## 2022-11-05 PROCEDURE — 36415 COLL VENOUS BLD VENIPUNCTURE: CPT

## 2022-11-05 PROCEDURE — 84484 ASSAY OF TROPONIN QUANT: CPT

## 2022-11-05 PROCEDURE — 6360000002 HC RX W HCPCS: Performed by: INTERNAL MEDICINE

## 2022-11-05 PROCEDURE — 96375 TX/PRO/DX INJ NEW DRUG ADDON: CPT

## 2022-11-05 PROCEDURE — 81001 URINALYSIS AUTO W/SCOPE: CPT

## 2022-11-05 PROCEDURE — 96374 THER/PROPH/DIAG INJ IV PUSH: CPT

## 2022-11-05 PROCEDURE — 2580000003 HC RX 258: Performed by: EMERGENCY MEDICINE

## 2022-11-05 PROCEDURE — 6360000004 HC RX CONTRAST MEDICATION: Performed by: EMERGENCY MEDICINE

## 2022-11-05 PROCEDURE — 6360000002 HC RX W HCPCS: Performed by: EMERGENCY MEDICINE

## 2022-11-05 PROCEDURE — 85025 COMPLETE CBC W/AUTO DIFF WBC: CPT

## 2022-11-05 PROCEDURE — 83690 ASSAY OF LIPASE: CPT

## 2022-11-05 RX ORDER — MELATONIN 10 MG
10 CAPSULE ORAL NIGHTLY
COMMUNITY

## 2022-11-05 RX ORDER — ONDANSETRON 2 MG/ML
4 INJECTION INTRAMUSCULAR; INTRAVENOUS EVERY 6 HOURS PRN
Status: DISCONTINUED | OUTPATIENT
Start: 2022-11-05 | End: 2022-11-11 | Stop reason: HOSPADM

## 2022-11-05 RX ORDER — SODIUM CHLORIDE 9 MG/ML
INJECTION, SOLUTION INTRAVENOUS PRN
Status: DISCONTINUED | OUTPATIENT
Start: 2022-11-05 | End: 2022-11-11 | Stop reason: HOSPADM

## 2022-11-05 RX ORDER — ASPIRIN 81 MG/1
81 TABLET ORAL DAILY
COMMUNITY
Start: 2019-12-10

## 2022-11-05 RX ORDER — POLYETHYLENE GLYCOL 3350 17 G/17G
17 POWDER, FOR SOLUTION ORAL DAILY PRN
Status: DISCONTINUED | OUTPATIENT
Start: 2022-11-05 | End: 2022-11-11 | Stop reason: HOSPADM

## 2022-11-05 RX ORDER — ACETAMINOPHEN 325 MG/1
650 TABLET ORAL EVERY 6 HOURS PRN
Status: DISCONTINUED | OUTPATIENT
Start: 2022-11-05 | End: 2022-11-11 | Stop reason: HOSPADM

## 2022-11-05 RX ORDER — ONDANSETRON 2 MG/ML
4 INJECTION INTRAMUSCULAR; INTRAVENOUS ONCE
Status: COMPLETED | OUTPATIENT
Start: 2022-11-05 | End: 2022-11-05

## 2022-11-05 RX ORDER — SODIUM CHLORIDE, SODIUM LACTATE, POTASSIUM CHLORIDE, CALCIUM CHLORIDE 600; 310; 30; 20 MG/100ML; MG/100ML; MG/100ML; MG/100ML
INJECTION, SOLUTION INTRAVENOUS CONTINUOUS
Status: DISCONTINUED | OUTPATIENT
Start: 2022-11-05 | End: 2022-11-08

## 2022-11-05 RX ORDER — SODIUM CHLORIDE 0.9 % (FLUSH) 0.9 %
5-40 SYRINGE (ML) INJECTION PRN
Status: DISCONTINUED | OUTPATIENT
Start: 2022-11-05 | End: 2022-11-11 | Stop reason: HOSPADM

## 2022-11-05 RX ORDER — NITROGLYCERIN 0.4 MG/1
0.4 TABLET SUBLINGUAL EVERY 5 MIN PRN
COMMUNITY
Start: 2022-07-06

## 2022-11-05 RX ORDER — ONDANSETRON 4 MG/1
4 TABLET, ORALLY DISINTEGRATING ORAL EVERY 8 HOURS PRN
Status: DISCONTINUED | OUTPATIENT
Start: 2022-11-05 | End: 2022-11-11 | Stop reason: HOSPADM

## 2022-11-05 RX ORDER — SODIUM CHLORIDE 0.9 % (FLUSH) 0.9 %
5-40 SYRINGE (ML) INJECTION EVERY 12 HOURS SCHEDULED
Status: DISCONTINUED | OUTPATIENT
Start: 2022-11-05 | End: 2022-11-11 | Stop reason: HOSPADM

## 2022-11-05 RX ORDER — FENTANYL CITRATE 50 UG/ML
50 INJECTION, SOLUTION INTRAMUSCULAR; INTRAVENOUS ONCE
Status: COMPLETED | OUTPATIENT
Start: 2022-11-05 | End: 2022-11-05

## 2022-11-05 RX ORDER — AMLODIPINE BESYLATE 2.5 MG/1
2.5 TABLET ORAL DAILY
COMMUNITY
Start: 2022-08-01

## 2022-11-05 RX ORDER — 0.9 % SODIUM CHLORIDE 0.9 %
1000 INTRAVENOUS SOLUTION INTRAVENOUS ONCE
Status: COMPLETED | OUTPATIENT
Start: 2022-11-05 | End: 2022-11-05

## 2022-11-05 RX ORDER — ENOXAPARIN SODIUM 100 MG/ML
40 INJECTION SUBCUTANEOUS DAILY
Status: DISCONTINUED | OUTPATIENT
Start: 2022-11-06 | End: 2022-11-06

## 2022-11-05 RX ORDER — ACETAMINOPHEN 650 MG/1
650 SUPPOSITORY RECTAL EVERY 6 HOURS PRN
Status: DISCONTINUED | OUTPATIENT
Start: 2022-11-05 | End: 2022-11-11 | Stop reason: HOSPADM

## 2022-11-05 RX ADMIN — SODIUM CHLORIDE 1000 ML: 9 INJECTION, SOLUTION INTRAVENOUS at 17:53

## 2022-11-05 RX ADMIN — HYDROMORPHONE HYDROCHLORIDE 0.25 MG: 1 INJECTION, SOLUTION INTRAMUSCULAR; INTRAVENOUS; SUBCUTANEOUS at 23:45

## 2022-11-05 RX ADMIN — FENTANYL CITRATE 50 MCG: 50 INJECTION, SOLUTION INTRAMUSCULAR; INTRAVENOUS at 17:33

## 2022-11-05 RX ADMIN — ONDANSETRON 4 MG: 2 INJECTION INTRAMUSCULAR; INTRAVENOUS at 19:29

## 2022-11-05 RX ADMIN — ONDANSETRON 4 MG: 2 INJECTION INTRAMUSCULAR; INTRAVENOUS at 17:32

## 2022-11-05 RX ADMIN — HYDROMORPHONE HYDROCHLORIDE 1 MG: 1 INJECTION, SOLUTION INTRAMUSCULAR; INTRAVENOUS; SUBCUTANEOUS at 19:29

## 2022-11-05 RX ADMIN — IOPAMIDOL 75 ML: 612 INJECTION, SOLUTION INTRAVENOUS at 18:33

## 2022-11-05 ASSESSMENT — ENCOUNTER SYMPTOMS
NAUSEA: 1
DIARRHEA: 0
BACK PAIN: 0
SHORTNESS OF BREATH: 0
SORE THROAT: 0
COUGH: 0
ABDOMINAL PAIN: 1
VOMITING: 0

## 2022-11-05 ASSESSMENT — PAIN SCALES - GENERAL
PAINLEVEL_OUTOF10: 9
PAINLEVEL_OUTOF10: 10

## 2022-11-05 ASSESSMENT — PAIN DESCRIPTION - PAIN TYPE: TYPE: ACUTE PAIN

## 2022-11-05 ASSESSMENT — LIFESTYLE VARIABLES
HOW OFTEN DO YOU HAVE A DRINK CONTAINING ALCOHOL: NEVER
HOW MANY STANDARD DRINKS CONTAINING ALCOHOL DO YOU HAVE ON A TYPICAL DAY: PATIENT DOES NOT DRINK

## 2022-11-05 ASSESSMENT — PAIN DESCRIPTION - DIRECTION: RADIATING_TOWARDS: BACK

## 2022-11-05 ASSESSMENT — PAIN DESCRIPTION - LOCATION
LOCATION: ABDOMEN
LOCATION: ABDOMEN

## 2022-11-05 ASSESSMENT — PAIN DESCRIPTION - ORIENTATION: ORIENTATION: MID

## 2022-11-05 ASSESSMENT — PAIN - FUNCTIONAL ASSESSMENT: PAIN_FUNCTIONAL_ASSESSMENT: 0-10

## 2022-11-05 NOTE — H&P
Patient is a 59-year-old female with past medical history of small bowel obstruction status post surgery removing a part of her large intestine comes in for abdominal pain started this morning woke up from pain. Generalized pain. Nonradiating. Localized. Accompanied with nausea no vomiting. No fever no chest pain or shortness of breath. CT abdomen was done in the ER showing mildly dilated loops of the proximal jejunum that was suggestive of small obstruction. The transition point is likely in the lower abdomen no free air. Small fat-containing periumbilical hernia. ER physician spoke with general surgeon recommended to be admitted under medicine team and he will be on consult. Past Medical History:   Diagnosis Date    Cancer ([facility] [address] 1999    bladder    Hyperlipidemia     Hypertension      Past Surgical History:   Procedure Laterality Date    ABDOMEN SURGERY      large intestine partial removal    APPENDECTOMY      CHOLECYSTECTOMY      HYSTERECTOMY (CERVIX STATUS UNKNOWN)       Social History       Tobacco History       Smoking Status  Never      Smokeless Tobacco Use  Unknown              Alcohol History       Alcohol Use Status  No              Drug Use       Drug Use Status  No              Sexual Activity       Sexually Active  Not Asked                  History reviewed. No pertinent family history. No current facility-administered medications on file prior to encounter.      Current Outpatient Medications on File Prior to Encounter   Medication Sig Dispense Refill    dicyclomine (BENTYL) 10 MG capsule Take 1 capsule by mouth every 6 hours as needed (cramps) 20 capsule 0    ondansetron (ZOFRAN ODT) 4 MG disintegrating tablet Take 1 tablet by mouth every 8 hours as needed for Nausea 3 tablet 0    Cholecalciferol 2000 units CAPS Take one tab PO QD      gabapentin (NEURONTIN) 100 MG capsule 1 tab in the morning and 2 tabs at bedtime      diphenhydrAMINE (SIMPLY SLEEP) 25 MG tablet Take by mouth oxybutynin (DITROPAN) 5 MG tablet Take 5 mg by mouth 2 times daily      metoprolol tartrate (LOPRESSOR) 25 MG tablet Take 25 mg by mouth 2 times daily      pravastatin (PRAVACHOL) 40 MG tablet Take 40 mg by mouth daily      acetaminophen (TYLENOL) 500 MG tablet Take 500 mg by mouth as needed for Pain      NONFORMULARY Indications: Instaflex daily      NONFORMULARY Indications: Sleep Aid nightly      meloxicam (MOBIC) 7.5 MG tablet Take 1 tablet by mouth daily 10 tablet 3     ROS: 12 point review of system is negative except listed in HPI  HEENT: AT/NC, PERRLA, no JVD  HEART: s1/s2 wnl w/o s3  LUNG: clear  ABD: generalized tenderness, + scars    EXT: no edema  SKin : no rash  Neuro:no focal deficits  1 )SBO  Admit to inpatient, n.p.o., NG tube to low wall suction, surgical evaluation, pain control. Follow-up labs, spoke with  at bedside.

## 2022-11-05 NOTE — ED PROVIDER NOTES
[address] [facility] ED  eMERGENCYdEPARTMENT eNCOUnter      Pt Name: Doris Aguilar  MRN: [id number]  [place] 1935  Date of evaluation: 11/5/2022  Pepito Shea MD    CHIEF COMPLAINT           HPI  Doris Aguilar is a 80 y.o. female per chart review has a h/o HTN, Hpl, bladder Ca presents to the ED with ab pain, nausea. Pt notes gradual onset, severe, constant, sharp, lower ab pain since 9 am.  +N/-v.  Pt denies fever, cp, sob, dysuria, diarrhea. ROS  Review of Systems   Constitutional:  Negative for activity change, chills and fever. HENT:  Negative for ear pain and sore throat. Eyes:  Negative for visual disturbance. Respiratory:  Negative for cough and shortness of breath. Cardiovascular:  Negative for chest pain, palpitations and leg swelling. Gastrointestinal:  Positive for abdominal pain and nausea. Negative for diarrhea and vomiting. Genitourinary:  Negative for dysuria. Musculoskeletal:  Negative for back pain. Skin:  Negative for rash. Neurological:  Negative for dizziness and weakness. Except as noted above the remainder of the review of systems was reviewed and negative.        PAST MEDICAL HISTORY     Past Medical History:   Diagnosis Date    Cancer [facility]) 1999    bladder    Hyperlipidemia     Hypertension          SURGICAL HISTORY       Past Surgical History:   Procedure Laterality Date    ABDOMEN SURGERY      large intestine partial removal    APPENDECTOMY      CHOLECYSTECTOMY      HYSTERECTOMY (CERVIX STATUS UNKNOWN)           CURRENTMEDICATIONS       Previous Medications    ACETAMINOPHEN (TYLENOL) 500 MG TABLET    Take 500 mg by mouth as needed for Pain    CHOLECALCIFEROL 2000 UNITS CAPS    Take one tab PO QD    DICYCLOMINE (BENTYL) 10 MG CAPSULE    Take 1 capsule by mouth every 6 hours as needed (cramps)    DIPHENHYDRAMINE (SIMPLY SLEEP) 25 MG TABLET    Take by mouth    GABAPENTIN (NEURONTIN) 100 MG CAPSULE    1 tab in the morning and 2 tabs at bedtime MELOXICAM (MOBIC) 7.5 MG TABLET    Take 1 tablet by mouth daily    METOPROLOL TARTRATE (LOPRESSOR) 25 MG TABLET    Take 25 mg by mouth 2 times daily    NONFORMULARY    Indications: Instaflex daily    NONFORMULARY    Indications: Sleep Aid nightly    ONDANSETRON (ZOFRAN ODT) 4 MG DISINTEGRATING TABLET    Take 1 tablet by mouth every 8 hours as needed for Nausea    OXYBUTYNIN (DITROPAN) 5 MG TABLET    Take 5 mg by mouth 2 times daily    PRAVASTATIN (PRAVACHOL) 40 MG TABLET    Take 40 mg by mouth daily       ALLERGIES     Phenazopyridine    FAMILY HISTORY     History reviewed. No pertinent family history. SOCIAL HISTORY       Social History     Socioeconomic History    Marital status:      Spouse name: None    Number of children: None    Years of education: None    Highest education level: None   Tobacco Use    Smoking status: Never   Substance and Sexual Activity    Alcohol use: No    Drug use: No         PHYSICAL EXAM       ED Triage Vitals [11/05/22 1637]   BP Temp Temp Source Heart Rate Resp SpO2 Height Weight   117/68 98.7 °F (37.1 °C) Temporal (!) 104 22 95 % -- --       Physical Exam  Vitals and nursing note reviewed. Constitutional:       Appearance: She is well-developed. HENT:      Head: Normocephalic. Right Ear: External ear normal.      Left Ear: External ear normal.   Eyes:      Conjunctiva/sclera: Conjunctivae normal.      Pupils: Pupils are equal, round, and reactive to light. Cardiovascular:      Rate and Rhythm: Normal rate and regular rhythm. Heart sounds: Normal heart sounds. Pulmonary:      Effort: Pulmonary effort is normal.      Breath sounds: Normal breath sounds. Abdominal:      General: Bowel sounds are normal. There is no distension. Palpations: Abdomen is soft. Tenderness: There is abdominal tenderness in the right lower quadrant, suprapubic area and left lower quadrant. There is no guarding or rebound.    Musculoskeletal:         General: Normal range of motion. Cervical back: Normal range of motion and neck supple. Skin:     General: Skin is warm and dry. Neurological:      Mental Status: She is alert and oriented to person, place, and time. Psychiatric:         Mood and Affect: Mood normal.         MDM  79 yo female presents to the ED with ab pain, nausea. Pt is afebrile, hemodynamically stable. Pt given 1 L NS, IV fentanyl, IV zofran in the ED. Labs unremarkable. Pt previously had a hysterectomy and appendectomy. Pt also had a SBO 25 years ago and had 12 inches of bowel removed. Pt reassessed and still having severe pain. Pt given IV dilaudid, IV zofran in the ED. NG tube placed in the ED. Given SBO, case discussed with Dr. Blas Plaza (surgery) who recommended admission to medicine. Case then discussed with Dr. Everette Oviedo and pt admitted to medicine. FINAL IMPRESSION      1.  Small bowel obstruction [facility])          DISPOSITION/PLAN   DISPOSITION Decision To Admit 11/05/2022 06:44:23 PM        DISCHARGE MEDICATIONS:  [unfilled]         Helder Mckeon MD(electronically signed)  Attending Emergency Physician            Helder Mckeon MD  11/05/22 0906

## 2022-11-06 LAB
ANION GAP SERPL CALCULATED.3IONS-SCNC: 18 MEQ/L (ref 9–15)
BACTERIA: NEGATIVE /HPF
BASOPHILS ABSOLUTE: 0 K/UL (ref 0–0.2)
BASOPHILS RELATIVE PERCENT: 0.3 %
BILIRUBIN URINE: NEGATIVE
BLOOD, URINE: NEGATIVE
BUN BLDV-MCNC: 18 MG/DL (ref 8–23)
CALCIUM SERPL-MCNC: 9.1 MG/DL (ref 8.5–9.9)
CHLORIDE BLD-SCNC: 105 MEQ/L (ref 95–107)
CLARITY: CLEAR
CO2: 20 MEQ/L (ref 20–31)
COLOR: YELLOW
CREAT SERPL-MCNC: 0.55 MG/DL (ref 0.5–0.9)
EOSINOPHILS ABSOLUTE: 0 K/UL (ref 0–0.7)
EOSINOPHILS RELATIVE PERCENT: 0.1 %
EPITHELIAL CELLS, UA: ABNORMAL /HPF (ref 0–5)
GFR SERPL CREATININE-BSD FRML MDRD: >60 ML/MIN/{1.73_M2}
GLUCOSE BLD-MCNC: 89 MG/DL (ref 70–99)
GLUCOSE URINE: NEGATIVE MG/DL
HCT VFR BLD CALC: 39.5 % (ref 37–47)
HEMOGLOBIN: 13.3 G/DL (ref 12–16)
HYALINE CASTS: ABNORMAL /HPF (ref 0–5)
KETONES, URINE: 15 MG/DL
LEUKOCYTE ESTERASE, URINE: ABNORMAL
LYMPHOCYTES ABSOLUTE: 1.5 K/UL (ref 1–4.8)
LYMPHOCYTES RELATIVE PERCENT: 27.6 %
MCH RBC QN AUTO: 33.1 PG (ref 27–31.3)
MCHC RBC AUTO-ENTMCNC: 33.6 % (ref 33–37)
MCV RBC AUTO: 98.4 FL (ref 79.4–94.8)
MONOCYTES ABSOLUTE: 0.7 K/UL (ref 0.2–0.8)
MONOCYTES RELATIVE PERCENT: 14 %
NEUTROPHILS ABSOLUTE: 3 K/UL (ref 1.4–6.5)
NEUTROPHILS RELATIVE PERCENT: 58 %
NITRITE, URINE: NEGATIVE
PDW BLD-RTO: 14 % (ref 11.5–14.5)
PH UA: 5.5 (ref 5–9)
PLATELET # BLD: 132 K/UL (ref 130–400)
POTASSIUM SERPL-SCNC: 4.5 MEQ/L (ref 3.4–4.9)
PROTEIN UA: ABNORMAL MG/DL
RBC # BLD: 4.02 M/UL (ref 4.2–5.4)
RBC UA: ABNORMAL /HPF (ref 0–5)
SODIUM BLD-SCNC: 143 MEQ/L (ref 135–144)
SPECIFIC GRAVITY UA: 1.06 (ref 1–1.03)
URINE REFLEX TO CULTURE: YES
UROBILINOGEN, URINE: 0.2 E.U./DL
WBC # BLD: 5.3 K/UL (ref 4.8–10.8)
WBC UA: ABNORMAL /HPF (ref 0–5)

## 2022-11-06 PROCEDURE — 6360000002 HC RX W HCPCS: Performed by: INTERNAL MEDICINE

## 2022-11-06 PROCEDURE — 2580000003 HC RX 258: Performed by: INTERNAL MEDICINE

## 2022-11-06 PROCEDURE — 85025 COMPLETE CBC W/AUTO DIFF WBC: CPT

## 2022-11-06 PROCEDURE — 80048 BASIC METABOLIC PNL TOTAL CA: CPT

## 2022-11-06 PROCEDURE — 99221 1ST HOSP IP/OBS SF/LOW 40: CPT | Performed by: COLON & RECTAL SURGERY

## 2022-11-06 PROCEDURE — 6370000000 HC RX 637 (ALT 250 FOR IP): Performed by: INTERNAL MEDICINE

## 2022-11-06 PROCEDURE — C9113 INJ PANTOPRAZOLE SODIUM, VIA: HCPCS | Performed by: INTERNAL MEDICINE

## 2022-11-06 PROCEDURE — 1210000000 HC MED SURG R&B

## 2022-11-06 PROCEDURE — 36415 COLL VENOUS BLD VENIPUNCTURE: CPT

## 2022-11-06 RX ORDER — GABAPENTIN 100 MG/1
200 CAPSULE ORAL NIGHTLY
Status: DISCONTINUED | OUTPATIENT
Start: 2022-11-06 | End: 2022-11-11 | Stop reason: HOSPADM

## 2022-11-06 RX ORDER — ASPIRIN 81 MG/1
81 TABLET ORAL DAILY
Status: DISCONTINUED | OUTPATIENT
Start: 2022-11-06 | End: 2022-11-11 | Stop reason: HOSPADM

## 2022-11-06 RX ORDER — AMLODIPINE BESYLATE 2.5 MG/1
2.5 TABLET ORAL DAILY
Status: DISCONTINUED | OUTPATIENT
Start: 2022-11-06 | End: 2022-11-09

## 2022-11-06 RX ORDER — DICYCLOMINE HYDROCHLORIDE 10 MG/1
10 CAPSULE ORAL EVERY 6 HOURS PRN
Status: DISCONTINUED | OUTPATIENT
Start: 2022-11-06 | End: 2022-11-09

## 2022-11-06 RX ORDER — OXYBUTYNIN CHLORIDE 5 MG/1
5 TABLET ORAL 2 TIMES DAILY
Status: DISCONTINUED | OUTPATIENT
Start: 2022-11-06 | End: 2022-11-09

## 2022-11-06 RX ORDER — PRAVASTATIN SODIUM 40 MG
40 TABLET ORAL DAILY
Status: DISCONTINUED | OUTPATIENT
Start: 2022-11-06 | End: 2022-11-11 | Stop reason: HOSPADM

## 2022-11-06 RX ORDER — HYDRALAZINE HYDROCHLORIDE 20 MG/ML
10 INJECTION INTRAMUSCULAR; INTRAVENOUS EVERY 6 HOURS PRN
Status: DISCONTINUED | OUTPATIENT
Start: 2022-11-06 | End: 2022-11-11 | Stop reason: HOSPADM

## 2022-11-06 RX ORDER — HEPARIN SODIUM 5000 [USP'U]/ML
5000 INJECTION, SOLUTION INTRAVENOUS; SUBCUTANEOUS EVERY 8 HOURS SCHEDULED
Status: DISCONTINUED | OUTPATIENT
Start: 2022-11-06 | End: 2022-11-11 | Stop reason: HOSPADM

## 2022-11-06 RX ADMIN — PHENOL 1 SPRAY: 1.5 LIQUID ORAL at 21:03

## 2022-11-06 RX ADMIN — SODIUM CHLORIDE, PRESERVATIVE FREE 40 MG: 5 INJECTION INTRAVENOUS at 16:37

## 2022-11-06 RX ADMIN — HYDROMORPHONE HYDROCHLORIDE 0.25 MG: 1 INJECTION, SOLUTION INTRAMUSCULAR; INTRAVENOUS; SUBCUTANEOUS at 14:51

## 2022-11-06 RX ADMIN — SODIUM CHLORIDE, POTASSIUM CHLORIDE, SODIUM LACTATE AND CALCIUM CHLORIDE: 600; 310; 30; 20 INJECTION, SOLUTION INTRAVENOUS at 07:41

## 2022-11-06 RX ADMIN — Medication 10 ML: at 00:28

## 2022-11-06 RX ADMIN — HEPARIN SODIUM 5000 UNITS: 5000 INJECTION INTRAVENOUS; SUBCUTANEOUS at 16:30

## 2022-11-06 RX ADMIN — HYDROMORPHONE HYDROCHLORIDE 0.25 MG: 1 INJECTION, SOLUTION INTRAMUSCULAR; INTRAVENOUS; SUBCUTANEOUS at 07:40

## 2022-11-06 RX ADMIN — SODIUM CHLORIDE, POTASSIUM CHLORIDE, SODIUM LACTATE AND CALCIUM CHLORIDE: 600; 310; 30; 20 INJECTION, SOLUTION INTRAVENOUS at 00:28

## 2022-11-06 ASSESSMENT — PAIN DESCRIPTION - DESCRIPTORS: DESCRIPTORS: ACHING

## 2022-11-06 ASSESSMENT — PAIN DESCRIPTION - LOCATION
LOCATION: ABDOMEN
LOCATION: THROAT

## 2022-11-06 ASSESSMENT — PAIN SCALES - GENERAL
PAINLEVEL_OUTOF10: 8
PAINLEVEL_OUTOF10: 2
PAINLEVEL_OUTOF10: 8
PAINLEVEL_OUTOF10: 7

## 2022-11-06 NOTE — CARE COORDINATION
[facility] AT [place] Case Management Initial Discharge Assessment    Met with Patient to discuss discharge plan. PCP: Andre Beach MD                                Date of Last Visit: 6/22/22    [facility] Patient: No        [facility] Notified: no    If no PCP, list provided? N/A    Discharge Planning    Living Arrangements: independently at home    Who do you live with?     Who helps you with your care:  self    If lives at home:     Do you have any barriers navigating in your home? no    Patient can perform ADL? Yes    Current Services (outpatient and in home) :  None    Dialysis: No    Is transportation available to get to your appointments? Yes    DME Equipment:  no    Respiratory equipment: None    Respiratory provider:  no     Pharmacy:  yes - [facility]    Consult with Medication Assistance Program?  No        Does Patient Have a High-Risk for Readmission Diagnosis (CHF, PN, MI, COPD)? No      Initial Discharge Plan? (Note: please see concurrent daily documentation for any updates after initial note). Pt states she is unsure of her d/c needs. Information on hhc/rehab/snf services were given to her by this nurse. Cm to assess for further d/c needs and referrals.     Readmission Risk              Risk of Unplanned Readmission:  0         Electronically signed by Selwyn Jerez RN on 11/5/2022 at 8:20 PM

## 2022-11-06 NOTE — PROGRESS NOTES
Progress Note  Date:2022       OOPQ:B009/E103-61  Patient Name:Rossi Chinchilla     YOB: 1935     Age:87 y.o. Subjective      Patient still with nausea, repeated small-volume emesis, abdominal discomfort. Discomfort and in setting of NG tube. Elder son Beatriz Mccollum came up earlier today, drunk again, made a big scene, \"almost had security called\", patient doesn't want to see him drunk here again or to have him cause a big scene. Patient still unsure about proceeding with surgery recommended by Colorectal Surgeon. She now reports that she's been having some dyspnea/chest pressure provoked by physical exertion (basement stairs) or emotional distress at home recently, and is worried about her heart making surgery more risky. Objective         Vitals Last 24 Hours:  TEMPERATURE:  Temp  Av.4 °F (36.9 °C)  Min: 98.1 °F (36.7 °C)  Max: 98.7 °F (37.1 °C)  RESPIRATIONS RANGE: Resp  Av.5  Min: 17  Max: 22  PULSE OXIMETRY RANGE: SpO2  Av.9 %  Min: 93 %  Max: 99 %  PULSE RANGE: Pulse  Av.5  Min: 85  Max: 104  BLOOD PRESSURE RANGE: Systolic (36RZZ), LMQ:920 , Min:117 , KYF:732   ; Diastolic (52CAK), UJR:57, Min:62, Max:91    I/O (24Hr): Intake/Output Summary (Last 24 hours) at 2022 9720  Last data filed at 2022 2018  Gross per 24 hour   Intake 1000 ml   Output --   Net 1000 ml     Objective    Constitutional: Obese elderly adult female reclining in bed appearing uncomfortable  Head: NCAT  ENT: Hard of hearing. NG tube in place with bilious output with slight coffee-ground texture. Neck: Trachea midline, phonation normal.  Large girth neck, no JVD appreciated. Cardiovascular: RRR, warm and well-perfused. Pulmonary: Normal rate and effort of respiration. Grossly CTAB. No coughing during exam.  Abdomen: Mildly obese. Non-tympanic. Generalized tenderness. Hypoactive bowel sounds. Old scars from prior surgeries. Neurology: Alert, grossly oriented.   Moderate generalized non-focal weakness. Moderately poor historian. Psychiatric: Cooperative. Anxious. Intermittently tearful. 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 Labs/Imaging/Diagnostics    Labs:  CBC:  Recent Labs     11/05/22 1715 11/06/22 0517   WBC 6.7 5.3   RBC 4.48 4.02*   HGB 14.3 13.3   HCT 43.4 39.5   MCV 96.9* 98.4*   RDW 13.7 14.0    132     CHEMISTRIES:  Recent Labs     11/05/22 1715 11/06/22 0517    143   K 4.0 4.5    105   CO2 25 20   BUN 17 18   CREATININE 0.62 0.55   GLUCOSE 145* 89   MG 2.1  --      PT/INR:No results for input(s): PROTIME, INR in the last 72 hours. APTT:No results for input(s): APTT in the last 72 hours. LIVER PROFILE:  Recent Labs     11/05/22 1715   AST 21   ALT 17   BILITOT 0.6   ALKPHOS 67       Imaging Last 24 Hours:  No results found. Assessment//Plan           Hospital Problems             Last Modified POA    * (Principal) SBO (small bowel obstruction) ([facility] [address] 11/5/2022 Yes     Assessment & Plan    Acute Problem:  SBO s/p bowel resection  N/V in setting of above  JOSEPH recently at home,  patient concern for cardiac problem  Emotional distress from intoxicated elder son visiting in hospital, making a scene    Plan:  Admitted to inpatient  NG to low suction  NPO  Maintenance LR at 100cc/hr while NPO  General Surgery on consult, have made recommendation FOR surgery  Patient still making up mind about whether to proceed with surgery or not. Continue other measures at present with no significant changes. 11/6: Cardiology consult for pre-surgical risk stratification in setting of newly reported JOSEPH with exertion/emotional stress at home.   Elder son Peña no longer allowed to visit at present after showing up intoxicated this morning, yelling at patient, and making a scene before storming out. Phenol throat spray Q2hr PRN for throat discomfort. Switch AC PPX to heparin TID instead of Lovenox, for faster washout if patient decides to proceed with surgery.         Electronically signed by Juan A Dyer DO on 11/6/22 at 9:23 AM EST

## 2022-11-06 NOTE — PLAN OF CARE
Problem: Discharge Planning  Goal: Discharge to home or other facility with appropriate resources  Outcome: Progressing  Flowsheets (Taken 11/5/2022 6475)  Discharge to home or other facility with appropriate resources:   Identify barriers to discharge with patient and caregiver   Identify discharge learning needs (meds, wound care, etc)   Refer to discharge planning if patient needs post-hospital services based on physician order or complex needs related to functional status, cognitive ability or social support system   Arrange for needed discharge resources and transportation as appropriate     Problem: Pain  Goal: Verbalizes/displays adequate comfort level or baseline comfort level  Outcome: Progressing     Problem: Safety - Adult  Goal: Free from fall injury  Outcome: Progressing     Problem: Skin/Tissue Integrity  Goal: Absence of new skin breakdown  Description: 1. Monitor for areas of redness and/or skin breakdown  2. Assess vascular access sites hourly  3. Every 4-6 hours minimum:  Change oxygen saturation probe site  4. Every 4-6 hours:  If on nasal continuous positive airway pressure, respiratory therapy assess nares and determine need for appliance change or resting period.   Outcome: Progressing

## 2022-11-06 NOTE — PROGRESS NOTES
Shift assessment completed and medications given per MAR. VSS and alert and oriented x's 4. Son was in room with patient to visit and keep her company and then  came to visit and keep patient company. NG tube is still effectively working and placement is still at 54. Patient is NPO and has no other needs known at this time. Will continue to monitor.

## 2022-11-06 NOTE — PROGRESS NOTES
Patient resting in bed. Complaints of pain and discomfort. Medicated per MAR. Home meds complete. No further needs. Call light in reach.

## 2022-11-06 NOTE — ACP (ADVANCE CARE PLANNING)
Advance Care Planning     Advance Care Planning Activator (Inpatient)  Conversation Note      Date of ACP Conversation: 11/5/2022     Conversation Conducted with: Patient with Decision Making Capacity    ACP Activator: Jl Carrasquillo RN        Health Care Decision Maker:     Current Designated Health Care Decision Maker:     Care Preferences    Ventilation: \"If you were in your present state of health and suddenly became very ill and were unable to breathe on your own, what would your preference be about the use of a ventilator (breathing machine) if it were available to you? \"      Would the patient desire the use of ventilator (breathing machine)?: yes    \"If your health worsens and it becomes clear that your chance of recovery is unlikely, what would your preference be about the use of a ventilator (breathing machine) if it were available to you? \"     Would the patient desire the use of ventilator (breathing machine)?: No      Resuscitation  \"CPR works best to restart the heart when there is a sudden event, like a heart attack, in someone who is otherwise healthy. Unfortunately, CPR does not typically restart the heart for people who have serious health conditions or who are very sick. \"    \"In the event your heart stopped as a result of an underlying serious health condition, would you want attempts to be made to restart your heart (answer \"yes\" for attempt to resuscitate) or would you prefer a natural death (answer \"no\" for do not attempt to resuscitate)? \" yes       [x] Yes   [] No   Educated Patient / [facility] regarding differences between Advance Directives and portable DNR orders.     Length of ACP Conversation in minutes:  10    Conversation Outcomes:  [x] ACP discussion completed  [] Existing advance directive reviewed with patient; no changes to patient's previously recorded wishes  [] New Advance Directive completed  [] Portable Do Not Rescitate prepared for Provider review and signature  [] POLST/POST/MOLST/MOST prepared for Provider review and signature      Follow-up plan:    [] Schedule follow-up conversation to continue planning  [] Referred individual to Provider for additional questions/concerns   [] Advised patient/agent/surrogate to review completed ACP document and update if needed with changes in condition, patient preferences or care setting    [x] This note routed to one or more involved healthcare providers

## 2022-11-06 NOTE — CONSULTS
Department of General Surgery - Adult  Surgical Service general surgery  Attending Consult Note      Reason for Consult: Bowel obstruction      CHIEF COMPLAINT: Abdominal pain/nausea vomiting    History Obtained From:  patient, electronic medical record    HISTORY OF PRESENT ILLNESS:                The patient is a 80 y.o. female who presents with 12 to 24-hour history of progressive abdominal distention nausea vomiting and pain. She was seen through the emergency department and admitted. CT scan of the abdomen showed a small bowel obstruction with transition point in the mid abdomen. Patient has a history of appendectomy, cholecystectomy, partial colon resection for diverticulitis 25 years ago. She feels better with nasogastric tube decompression but still complains of left-sided abdominal pain. No bowel function. I reviewed her CAT scan present with her and her son at the bedside. I reviewed her clinical course as well as laboratory values.     Past Medical History:        Diagnosis Date    Cancer ([facility] [address] 1999    bladder    Hyperlipidemia     Hypertension      Past Surgical History:        Procedure Laterality Date    ABDOMEN SURGERY      large intestine partial removal    APPENDECTOMY      CHOLECYSTECTOMY      HYSTERECTOMY (CERVIX STATUS UNKNOWN)       Current Medications:   Current Facility-Administered Medications: amLODIPine (NORVASC) tablet 2.5 mg, 2.5 mg, Oral, Daily  [Held by provider] aspirin EC tablet 81 mg, 81 mg, Oral, Daily  gabapentin (NEURONTIN) capsule 200 mg, 200 mg, Oral, Nightly  metoprolol tartrate (LOPRESSOR) tablet 25 mg, 25 mg, Oral, BID  oxybutynin (DITROPAN) tablet 5 mg, 5 mg, Oral, BID  pravastatin (PRAVACHOL) tablet 40 mg, 40 mg, Oral, Daily  dicyclomine (BENTYL) capsule 10 mg, 10 mg, Oral, Q6H PRN  hydrALAZINE (APRESOLINE) injection 10 mg, 10 mg, IntraVENous, Q6H PRN  sodium chloride flush 0.9 % injection 5-40 mL, 5-40 mL, IntraVENous, 2 times per day  sodium chloride flush 0.9 % injection 5-40 mL, 5-40 mL, IntraVENous, PRN  0.9 % sodium chloride infusion, , IntraVENous, PRN  enoxaparin (LOVENOX) injection 40 mg, 40 mg, SubCUTAneous, Daily  ondansetron (ZOFRAN-ODT) disintegrating tablet 4 mg, 4 mg, Oral, Q8H PRN **OR** ondansetron (ZOFRAN) injection 4 mg, 4 mg, IntraVENous, Q6H PRN  polyethylene glycol (GLYCOLAX) packet 17 g, 17 g, Oral, Daily PRN  acetaminophen (TYLENOL) tablet 650 mg, 650 mg, Oral, Q6H PRN **OR** acetaminophen (TYLENOL) suppository 650 mg, 650 mg, Rectal, Q6H PRN  lactated ringers infusion, , IntraVENous, Continuous  HYDROmorphone (DILAUDID) injection 0.25 mg, 0.25 mg, IntraVENous, Q4H PRN  Allergies:  Phenazopyridine    Social History:   TOBACCO:   reports that she has never smoked. She does not have any smokeless tobacco history on file. ETOH:   reports no history of alcohol use. DRUGS:   reports no history of drug use. Family History:   History reviewed. No pertinent family history.     REVIEW OF SYSTEMS:    CONSTITUTIONAL:  positive for  fatigue, malaise, and anorexia  EYES:  negative  HEENT:  positive for  hearing loss  RESPIRATORY:  negative for  dyspnea and wheezing  CARDIOVASCULAR: Negative for  chest pain, palpitations  GASTROINTESTINAL:  positive for nausea, vomiting, and abdominal pain  GENITOURINARY:  negative  HEMATOLOGIC/LYMPHATIC:  negative  MUSCULOSKELETAL:  negative  NEUROLOGICAL:  negative  BEHAVIOR/PSYCH:  negative    PHYSICAL EXAM:    VITALS:  BP (!) 155/72   Pulse 85   Temp 98.1 °F (36.7 °C) (Oral)   Resp 17   Ht 5' 1\" (1.549 m)   Wt 134 lb 8 oz (61 kg)   SpO2 97%   BMI 25.41 kg/m²   CONSTITUTIONAL:  awake, alert, cooperative, no apparent distress, and appears stated age  EYES:  Lids and lashes normal, pupils equal, round and reactive to light, extra ocular muscles intact, sclera clear, conjunctiva normal  ENT:  Normocephalic, without obvious abnormality, atraumatic, sinuses nontender on palpation, external ears without lesions, oral pharynx with moist mucus membranes, tonsils without erythema or exudates, gums normal and good dentition. NECK:  Supple, symmetrical, trachea midline, no adenopathy, thyroid symmetric, not enlarged and no tenderness, skin normal  HEMATOLOGIC/LYMPHATICS:  no cervical lymphadenopathy  BACK:  Symmetric, no curvature, spinous processes are non-tender on palpation, paraspinous muscles are non-tender on palpation, no costal vertebral tenderness  LUNGS:  No increased work of breathing, good air exchange, clear to auscultation bilaterally, no crackles or wheezing  CARDIOVASCULAR:  Normal apical impulse, regular rate and rhythm, normal S1 and S2, no S3 or S4, and no murmur noted  ABDOMEN: Abdominal wall incisions healed. No abdominal wall hernias. Abdomen distended. Tender on the left upper quadrant  MUSCULOSKELETAL:  There is no redness, warmth, or swelling of the joints. Full range of motion noted. Motor strength is 5 out of 5 all extremities bilaterally.   Tone is normal.  NEUROLOGIC: Awake alert and oriented  SKIN:  no bruising or bleeding  DATA:    CBC:   Lab Results   Component Value Date/Time    WBC 5.3 11/06/2022 05:17 AM    RBC 4.02 11/06/2022 05:17 AM    HGB 13.3 11/06/2022 05:17 AM    HCT 39.5 11/06/2022 05:17 AM    MCV 98.4 11/06/2022 05:17 AM    MCH 33.1 11/06/2022 05:17 AM    MCHC 33.6 11/06/2022 05:17 AM    RDW 14.0 11/06/2022 05:17 AM     11/06/2022 05:17 AM     CMP:    Lab Results   Component Value Date/Time     11/06/2022 05:17 AM    K 4.5 11/06/2022 05:17 AM    K 3.8 03/24/2019 06:21 AM     11/06/2022 05:17 AM    CO2 20 11/06/2022 05:17 AM    BUN 18 11/06/2022 05:17 AM    CREATININE 0.55 11/06/2022 05:17 AM    GFRAA >60 10/13/2020 01:39 PM    LABGLOM >60.0 11/06/2022 05:17 AM    GLUCOSE 89 11/06/2022 05:17 AM    PROT 7.1 11/05/2022 05:15 PM    LABALBU 4.5 11/05/2022 05:15 PM    CALCIUM 9.1 11/06/2022 05:17 AM    BILITOT 0.6 11/05/2022 05:15 PM    ALKPHOS 67 11/05/2022 05:15 PM AST 21 11/05/2022 05:15 PM    ALT 17 11/05/2022 05:15 PM     Radiology Review: CT of the abdomen reviewed. I compared this to a CT scan from 2020 as well    IMPRESSION/RECOMMENDATIONS:      Bowel obstruction with transition point mid abdomen. Obstruction related to adhesions. No evidence of intestinal ischemia based on imaging or laboratory values. I discussed the risks and benefits of nonoperative versus operative treatment of this bowel obstruction. Given the imaging with significant transition, the likelihood of nonoperative management successfully is low. Given her age, the risks of surgery are significant and were discussed. Risks of surgery including infection, bleeding, postoperative pain prolonged intubation as well as other complications such as arrhythmia heart attack stroke and even potential death were all discussed. Short-term care including nursing home care discussed as necessary. I believe patient will require operative intervention although she is still unsure as to her course of action. She will be talking about it with multiple family members to discuss those options.     I could proceed tomorrow if she is in agreement

## 2022-11-06 NOTE — PROGRESS NOTES
DVT / VTE PROPHYLAXIS EVALUATION    Estimated Creatinine Clearance: 60 mL/min (based on SCr of 0.55 mg/dL). Recent Labs     11/05/22  1715 11/06/22  0517   BUN 17 18   CREATININE 0.62 0.55    132   HGB 14.3 13.3   HCT 43.4 39.5     ADMITTING DX OR CHIEF COMPLAINT? Abdominal Pain, nausea, small bowel obstruction  WARFARIN? DOAC'S? No  ANY APPARENT BLEEDING? No  SCHEDULED SURGERY? Maybe. Small bowel obstruction that likely requires surgical intervention. Patient at risk for complications due to age and has not decided if she will proceed with the procedure or not. Current order:  Enoxaparin 40 mg SUBQ once daily      Plan:  No intervention recommended, continue current VTE prophylaxis as ordered     Patient Weight (kg)      50.9 and below .9 101-150.9 151-174.9 175 or greater   Estimated   CrCl  (ml/min) 30 or greater []   30 mg   SUBQ daily   [x]   40 mg   SUBQ daily []  30 mg SUBQ   BID  []  40 mg   SUBQ   BID []  60mg SUBQ BID    15-29.9 []  UFH 5000   units SUBQ BID []  30 mg   SUBQ daily [] 30 mg SUBQ   daily []  40 mg SUBQ   daily [] 60 mg SUBQ   daily    Less than 15 or dialysis []  UFH 5000   units SUBQ BID [] UFH 5000 units SUBQ TID []  UFH 7500   units   SUBQ TID     Pharmacy will monitor for hold instructions if patient decides to pursue surgical intervention.     Thank you,    Divya Eugene, PharmD  PGY-1 Pharmacy Resident  11/6/2022 10:53 AM

## 2022-11-07 ENCOUNTER — ANESTHESIA (OUTPATIENT)
Dept: OPERATING ROOM | Age: 87
DRG: 335 | End: 2022-11-07
Payer: MEDICARE

## 2022-11-07 ENCOUNTER — ANESTHESIA EVENT (OUTPATIENT)
Dept: OPERATING ROOM | Age: 87
DRG: 335 | End: 2022-11-07
Payer: MEDICARE

## 2022-11-07 LAB
ANION GAP SERPL CALCULATED.3IONS-SCNC: 13 MEQ/L (ref 9–15)
BASOPHILS ABSOLUTE: 0 K/UL (ref 0–0.2)
BASOPHILS RELATIVE PERCENT: 0.4 %
BUN BLDV-MCNC: 13 MG/DL (ref 8–23)
CALCIUM SERPL-MCNC: 8.4 MG/DL (ref 8.5–9.9)
CHLORIDE BLD-SCNC: 104 MEQ/L (ref 95–107)
CO2: 25 MEQ/L (ref 20–31)
CREAT SERPL-MCNC: 0.42 MG/DL (ref 0.5–0.9)
EOSINOPHILS ABSOLUTE: 0 K/UL (ref 0–0.7)
EOSINOPHILS RELATIVE PERCENT: 0.1 %
GFR SERPL CREATININE-BSD FRML MDRD: >60 ML/MIN/{1.73_M2}
GLUCOSE BLD-MCNC: 81 MG/DL (ref 70–99)
HCT VFR BLD CALC: 38.2 % (ref 37–47)
HEMOGLOBIN: 12.8 G/DL (ref 12–16)
LV EF: 65 %
LVEF MODALITY: NORMAL
LYMPHOCYTES ABSOLUTE: 1.3 K/UL (ref 1–4.8)
LYMPHOCYTES RELATIVE PERCENT: 21.3 %
MCH RBC QN AUTO: 32.7 PG (ref 27–31.3)
MCHC RBC AUTO-ENTMCNC: 33.5 % (ref 33–37)
MCV RBC AUTO: 97.5 FL (ref 79.4–94.8)
MONOCYTES ABSOLUTE: 0.6 K/UL (ref 0.2–0.8)
MONOCYTES RELATIVE PERCENT: 9.8 %
NEUTROPHILS ABSOLUTE: 4.1 K/UL (ref 1.4–6.5)
NEUTROPHILS RELATIVE PERCENT: 68.4 %
PDW BLD-RTO: 13.6 % (ref 11.5–14.5)
PLATELET # BLD: 126 K/UL (ref 130–400)
POTASSIUM SERPL-SCNC: 3.6 MEQ/L (ref 3.4–4.9)
RBC # BLD: 3.91 M/UL (ref 4.2–5.4)
SODIUM BLD-SCNC: 142 MEQ/L (ref 135–144)
URINE CULTURE, ROUTINE: NORMAL
WBC # BLD: 6 K/UL (ref 4.8–10.8)

## 2022-11-07 PROCEDURE — 2580000003 HC RX 258: Performed by: NURSE ANESTHETIST, CERTIFIED REGISTERED

## 2022-11-07 PROCEDURE — 64488 TAP BLOCK BI INJECTION: CPT | Performed by: STUDENT IN AN ORGANIZED HEALTH CARE EDUCATION/TRAINING PROGRAM

## 2022-11-07 PROCEDURE — 93005 ELECTROCARDIOGRAM TRACING: CPT

## 2022-11-07 PROCEDURE — 3600000004 HC SURGERY LEVEL 4 BASE: Performed by: COLON & RECTAL SURGERY

## 2022-11-07 PROCEDURE — 7100000000 HC PACU RECOVERY - FIRST 15 MIN: Performed by: COLON & RECTAL SURGERY

## 2022-11-07 PROCEDURE — 2580000003 HC RX 258: Performed by: COLON & RECTAL SURGERY

## 2022-11-07 PROCEDURE — A4217 STERILE WATER/SALINE, 500 ML: HCPCS | Performed by: COLON & RECTAL SURGERY

## 2022-11-07 PROCEDURE — 2500000003 HC RX 250 WO HCPCS: Performed by: NURSE ANESTHETIST, CERTIFIED REGISTERED

## 2022-11-07 PROCEDURE — A4216 STERILE WATER/SALINE, 10 ML: HCPCS | Performed by: COLON & RECTAL SURGERY

## 2022-11-07 PROCEDURE — 3700000000 HC ANESTHESIA ATTENDED CARE: Performed by: COLON & RECTAL SURGERY

## 2022-11-07 PROCEDURE — 44005 FREEING OF BOWEL ADHESION: CPT | Performed by: COLON & RECTAL SURGERY

## 2022-11-07 PROCEDURE — C9113 INJ PANTOPRAZOLE SODIUM, VIA: HCPCS | Performed by: INTERNAL MEDICINE

## 2022-11-07 PROCEDURE — APPSS45 APP SPLIT SHARED TIME 31-45 MINUTES: Performed by: PHYSICIAN ASSISTANT

## 2022-11-07 PROCEDURE — 6360000002 HC RX W HCPCS: Performed by: COLON & RECTAL SURGERY

## 2022-11-07 PROCEDURE — 3700000001 HC ADD 15 MINUTES (ANESTHESIA): Performed by: COLON & RECTAL SURGERY

## 2022-11-07 PROCEDURE — 85025 COMPLETE CBC W/AUTO DIFF WBC: CPT

## 2022-11-07 PROCEDURE — 6360000002 HC RX W HCPCS: Performed by: INTERNAL MEDICINE

## 2022-11-07 PROCEDURE — 36415 COLL VENOUS BLD VENIPUNCTURE: CPT

## 2022-11-07 PROCEDURE — 1210000000 HC MED SURG R&B

## 2022-11-07 PROCEDURE — 6360000002 HC RX W HCPCS: Performed by: STUDENT IN AN ORGANIZED HEALTH CARE EDUCATION/TRAINING PROGRAM

## 2022-11-07 PROCEDURE — C9113 INJ PANTOPRAZOLE SODIUM, VIA: HCPCS | Performed by: COLON & RECTAL SURGERY

## 2022-11-07 PROCEDURE — 93306 TTE W/DOPPLER COMPLETE: CPT

## 2022-11-07 PROCEDURE — 2500000003 HC RX 250 WO HCPCS: Performed by: REGISTERED NURSE

## 2022-11-07 PROCEDURE — 7100000001 HC PACU RECOVERY - ADDTL 15 MIN: Performed by: COLON & RECTAL SURGERY

## 2022-11-07 PROCEDURE — 2500000003 HC RX 250 WO HCPCS: Performed by: INTERNAL MEDICINE

## 2022-11-07 PROCEDURE — 2500000003 HC RX 250 WO HCPCS: Performed by: COLON & RECTAL SURGERY

## 2022-11-07 PROCEDURE — 2500000003 HC RX 250 WO HCPCS: Performed by: STUDENT IN AN ORGANIZED HEALTH CARE EDUCATION/TRAINING PROGRAM

## 2022-11-07 PROCEDURE — 6360000002 HC RX W HCPCS: Performed by: REGISTERED NURSE

## 2022-11-07 PROCEDURE — A4216 STERILE WATER/SALINE, 10 ML: HCPCS | Performed by: INTERNAL MEDICINE

## 2022-11-07 PROCEDURE — 0DN80ZZ RELEASE SMALL INTESTINE, OPEN APPROACH: ICD-10-PCS | Performed by: COLON & RECTAL SURGERY

## 2022-11-07 PROCEDURE — 2580000003 HC RX 258: Performed by: INTERNAL MEDICINE

## 2022-11-07 PROCEDURE — C1765 ADHESION BARRIER: HCPCS | Performed by: COLON & RECTAL SURGERY

## 2022-11-07 PROCEDURE — 2709999900 HC NON-CHARGEABLE SUPPLY: Performed by: COLON & RECTAL SURGERY

## 2022-11-07 PROCEDURE — 6360000002 HC RX W HCPCS: Performed by: NURSE ANESTHETIST, CERTIFIED REGISTERED

## 2022-11-07 PROCEDURE — 2580000003 HC RX 258: Performed by: STUDENT IN AN ORGANIZED HEALTH CARE EDUCATION/TRAINING PROGRAM

## 2022-11-07 PROCEDURE — 80048 BASIC METABOLIC PNL TOTAL CA: CPT

## 2022-11-07 PROCEDURE — 3600000014 HC SURGERY LEVEL 4 ADDTL 15MIN: Performed by: COLON & RECTAL SURGERY

## 2022-11-07 RX ORDER — FENTANYL CITRATE 50 UG/ML
INJECTION, SOLUTION INTRAMUSCULAR; INTRAVENOUS PRN
Status: DISCONTINUED | OUTPATIENT
Start: 2022-11-07 | End: 2022-11-07 | Stop reason: SDUPTHER

## 2022-11-07 RX ORDER — MEPERIDINE HYDROCHLORIDE 25 MG/ML
12.5 INJECTION INTRAMUSCULAR; INTRAVENOUS; SUBCUTANEOUS EVERY 5 MIN PRN
Status: DISCONTINUED | OUTPATIENT
Start: 2022-11-07 | End: 2022-11-07 | Stop reason: HOSPADM

## 2022-11-07 RX ORDER — METOPROLOL TARTRATE 5 MG/5ML
2.5 INJECTION INTRAVENOUS EVERY 6 HOURS
Status: DISCONTINUED | OUTPATIENT
Start: 2022-11-07 | End: 2022-11-11

## 2022-11-07 RX ORDER — HYDRALAZINE HYDROCHLORIDE 20 MG/ML
10 INJECTION INTRAMUSCULAR; INTRAVENOUS
Status: COMPLETED | OUTPATIENT
Start: 2022-11-07 | End: 2022-11-07

## 2022-11-07 RX ORDER — ONDANSETRON 2 MG/ML
INJECTION INTRAMUSCULAR; INTRAVENOUS PRN
Status: DISCONTINUED | OUTPATIENT
Start: 2022-11-07 | End: 2022-11-07 | Stop reason: SDUPTHER

## 2022-11-07 RX ORDER — SODIUM CHLORIDE 0.9 % (FLUSH) 0.9 %
5-40 SYRINGE (ML) INJECTION EVERY 12 HOURS SCHEDULED
Status: DISCONTINUED | OUTPATIENT
Start: 2022-11-07 | End: 2022-11-07 | Stop reason: HOSPADM

## 2022-11-07 RX ORDER — ONDANSETRON 2 MG/ML
4 INJECTION INTRAMUSCULAR; INTRAVENOUS
Status: DISCONTINUED | OUTPATIENT
Start: 2022-11-07 | End: 2022-11-07 | Stop reason: HOSPADM

## 2022-11-07 RX ORDER — ROCURONIUM BROMIDE 10 MG/ML
INJECTION, SOLUTION INTRAVENOUS PRN
Status: DISCONTINUED | OUTPATIENT
Start: 2022-11-07 | End: 2022-11-07 | Stop reason: SDUPTHER

## 2022-11-07 RX ORDER — OXYCODONE HYDROCHLORIDE 5 MG/1
5 TABLET ORAL PRN
Status: DISCONTINUED | OUTPATIENT
Start: 2022-11-07 | End: 2022-11-07 | Stop reason: HOSPADM

## 2022-11-07 RX ORDER — FENTANYL CITRATE 50 UG/ML
25 INJECTION, SOLUTION INTRAMUSCULAR; INTRAVENOUS EVERY 5 MIN PRN
Status: COMPLETED | OUTPATIENT
Start: 2022-11-07 | End: 2022-11-07

## 2022-11-07 RX ORDER — MAGNESIUM HYDROXIDE 1200 MG/15ML
LIQUID ORAL CONTINUOUS PRN
Status: DISCONTINUED | OUTPATIENT
Start: 2022-11-07 | End: 2022-11-07 | Stop reason: HOSPADM

## 2022-11-07 RX ORDER — OXYCODONE HYDROCHLORIDE 5 MG/1
10 TABLET ORAL PRN
Status: DISCONTINUED | OUTPATIENT
Start: 2022-11-07 | End: 2022-11-07 | Stop reason: HOSPADM

## 2022-11-07 RX ORDER — LABETALOL HYDROCHLORIDE 5 MG/ML
10 INJECTION, SOLUTION INTRAVENOUS
Status: COMPLETED | OUTPATIENT
Start: 2022-11-07 | End: 2022-11-07

## 2022-11-07 RX ORDER — LIDOCAINE HYDROCHLORIDE 20 MG/ML
INJECTION, SOLUTION INTRAVENOUS PRN
Status: DISCONTINUED | OUTPATIENT
Start: 2022-11-07 | End: 2022-11-07 | Stop reason: SDUPTHER

## 2022-11-07 RX ORDER — FENTANYL CITRATE 50 UG/ML
50 INJECTION, SOLUTION INTRAMUSCULAR; INTRAVENOUS EVERY 5 MIN PRN
Status: DISCONTINUED | OUTPATIENT
Start: 2022-11-07 | End: 2022-11-07 | Stop reason: HOSPADM

## 2022-11-07 RX ORDER — KETOROLAC TROMETHAMINE 30 MG/ML
30 INJECTION, SOLUTION INTRAMUSCULAR; INTRAVENOUS EVERY 6 HOURS PRN
Status: ACTIVE | OUTPATIENT
Start: 2022-11-07 | End: 2022-11-10

## 2022-11-07 RX ORDER — SODIUM CHLORIDE, SODIUM LACTATE, POTASSIUM CHLORIDE, CALCIUM CHLORIDE 600; 310; 30; 20 MG/100ML; MG/100ML; MG/100ML; MG/100ML
INJECTION, SOLUTION INTRAVENOUS CONTINUOUS
Status: DISCONTINUED | OUTPATIENT
Start: 2022-11-07 | End: 2022-11-08

## 2022-11-07 RX ORDER — PROPOFOL 10 MG/ML
INJECTION, EMULSION INTRAVENOUS PRN
Status: DISCONTINUED | OUTPATIENT
Start: 2022-11-07 | End: 2022-11-07 | Stop reason: SDUPTHER

## 2022-11-07 RX ORDER — PROCHLORPERAZINE EDISYLATE 5 MG/ML
5 INJECTION INTRAMUSCULAR; INTRAVENOUS
Status: DISCONTINUED | OUTPATIENT
Start: 2022-11-07 | End: 2022-11-07 | Stop reason: HOSPADM

## 2022-11-07 RX ORDER — SODIUM CHLORIDE, SODIUM LACTATE, POTASSIUM CHLORIDE, CALCIUM CHLORIDE 600; 310; 30; 20 MG/100ML; MG/100ML; MG/100ML; MG/100ML
INJECTION, SOLUTION INTRAVENOUS CONTINUOUS PRN
Status: DISCONTINUED | OUTPATIENT
Start: 2022-11-07 | End: 2022-11-07 | Stop reason: SDUPTHER

## 2022-11-07 RX ORDER — DEXAMETHASONE SODIUM PHOSPHATE 10 MG/ML
INJECTION INTRAMUSCULAR; INTRAVENOUS PRN
Status: DISCONTINUED | OUTPATIENT
Start: 2022-11-07 | End: 2022-11-07 | Stop reason: SDUPTHER

## 2022-11-07 RX ORDER — DIPHENHYDRAMINE HYDROCHLORIDE 50 MG/ML
12.5 INJECTION INTRAMUSCULAR; INTRAVENOUS
Status: DISCONTINUED | OUTPATIENT
Start: 2022-11-07 | End: 2022-11-07 | Stop reason: HOSPADM

## 2022-11-07 RX ORDER — SODIUM CHLORIDE 9 MG/ML
25 INJECTION, SOLUTION INTRAVENOUS PRN
Status: DISCONTINUED | OUTPATIENT
Start: 2022-11-07 | End: 2022-11-07 | Stop reason: HOSPADM

## 2022-11-07 RX ORDER — SODIUM CHLORIDE 0.9 % (FLUSH) 0.9 %
5-40 SYRINGE (ML) INJECTION PRN
Status: DISCONTINUED | OUTPATIENT
Start: 2022-11-07 | End: 2022-11-07 | Stop reason: HOSPADM

## 2022-11-07 RX ORDER — SUCCINYLCHOLINE/SOD CL,ISO/PF 100 MG/5ML
SYRINGE (ML) INTRAVENOUS PRN
Status: DISCONTINUED | OUTPATIENT
Start: 2022-11-07 | End: 2022-11-07 | Stop reason: SDUPTHER

## 2022-11-07 RX ADMIN — Medication 100 MG: at 14:12

## 2022-11-07 RX ADMIN — FENTANYL CITRATE 25 MCG: 0.05 INJECTION, SOLUTION INTRAMUSCULAR; INTRAVENOUS at 16:18

## 2022-11-07 RX ADMIN — PROPOFOL 150 MG: 10 INJECTION, EMULSION INTRAVENOUS at 14:12

## 2022-11-07 RX ADMIN — FENTANYL CITRATE 50 MCG: 50 INJECTION, SOLUTION INTRAMUSCULAR; INTRAVENOUS at 14:44

## 2022-11-07 RX ADMIN — METOPROLOL TARTRATE 2.5 MG: 5 INJECTION, SOLUTION INTRAVENOUS at 18:26

## 2022-11-07 RX ADMIN — HYDRALAZINE HYDROCHLORIDE 10 MG: 20 INJECTION INTRAMUSCULAR; INTRAVENOUS at 16:02

## 2022-11-07 RX ADMIN — METOPROLOL TARTRATE 2.5 MG: 5 INJECTION, SOLUTION INTRAVENOUS at 11:13

## 2022-11-07 RX ADMIN — CEFAZOLIN 2000 MG: 10 INJECTION, POWDER, FOR SOLUTION INTRAVENOUS at 14:19

## 2022-11-07 RX ADMIN — FENTANYL CITRATE 25 MCG: 50 INJECTION, SOLUTION INTRAMUSCULAR; INTRAVENOUS at 14:07

## 2022-11-07 RX ADMIN — PHENYLEPHRINE HYDROCHLORIDE 100 MCG: 10 INJECTION INTRAVENOUS at 14:36

## 2022-11-07 RX ADMIN — SODIUM CHLORIDE, POTASSIUM CHLORIDE, SODIUM LACTATE AND CALCIUM CHLORIDE 1000 ML: 600; 310; 30; 20 INJECTION, SOLUTION INTRAVENOUS at 13:56

## 2022-11-07 RX ADMIN — SUGAMMADEX 200 MG: 100 INJECTION, SOLUTION INTRAVENOUS at 15:08

## 2022-11-07 RX ADMIN — ONDANSETRON 4 MG: 2 INJECTION INTRAMUSCULAR; INTRAVENOUS at 15:08

## 2022-11-07 RX ADMIN — FENTANYL CITRATE 25 MCG: 50 INJECTION, SOLUTION INTRAMUSCULAR; INTRAVENOUS at 14:12

## 2022-11-07 RX ADMIN — FENTANYL CITRATE 50 MCG: 50 INJECTION, SOLUTION INTRAMUSCULAR; INTRAVENOUS at 15:13

## 2022-11-07 RX ADMIN — LIDOCAINE HYDROCHLORIDE 100 MG: 20 INJECTION, SOLUTION INTRAVENOUS at 14:12

## 2022-11-07 RX ADMIN — ROCURONIUM BROMIDE 50 MG: 10 INJECTION INTRAVENOUS at 14:15

## 2022-11-07 RX ADMIN — SODIUM CHLORIDE, PRESERVATIVE FREE 40 MG: 5 INJECTION INTRAVENOUS at 18:26

## 2022-11-07 RX ADMIN — PHENOL 1 SPRAY: 1.5 LIQUID ORAL at 00:58

## 2022-11-07 RX ADMIN — HEPARIN SODIUM 5000 UNITS: 5000 INJECTION INTRAVENOUS; SUBCUTANEOUS at 00:58

## 2022-11-07 RX ADMIN — SODIUM CHLORIDE, PRESERVATIVE FREE 40 MG: 5 INJECTION INTRAVENOUS at 07:04

## 2022-11-07 RX ADMIN — HYDROMORPHONE HYDROCHLORIDE 0.25 MG: 1 INJECTION, SOLUTION INTRAMUSCULAR; INTRAVENOUS; SUBCUTANEOUS at 21:55

## 2022-11-07 RX ADMIN — SODIUM CHLORIDE, POTASSIUM CHLORIDE, SODIUM LACTATE AND CALCIUM CHLORIDE: 600; 310; 30; 20 INJECTION, SOLUTION INTRAVENOUS at 14:07

## 2022-11-07 RX ADMIN — FENTANYL CITRATE 50 MCG: 50 INJECTION, SOLUTION INTRAMUSCULAR; INTRAVENOUS at 14:55

## 2022-11-07 RX ADMIN — ROCURONIUM BROMIDE 20 MG: 10 INJECTION INTRAVENOUS at 14:44

## 2022-11-07 RX ADMIN — FENTANYL CITRATE 25 MCG: 0.05 INJECTION, SOLUTION INTRAMUSCULAR; INTRAVENOUS at 16:40

## 2022-11-07 RX ADMIN — Medication 5 ML: at 21:56

## 2022-11-07 RX ADMIN — FENTANYL CITRATE 25 MCG: 0.05 INJECTION, SOLUTION INTRAMUSCULAR; INTRAVENOUS at 17:09

## 2022-11-07 RX ADMIN — FENTANYL CITRATE 25 MCG: 0.05 INJECTION, SOLUTION INTRAMUSCULAR; INTRAVENOUS at 16:53

## 2022-11-07 RX ADMIN — SODIUM CHLORIDE, POTASSIUM CHLORIDE, SODIUM LACTATE AND CALCIUM CHLORIDE: 600; 310; 30; 20 INJECTION, SOLUTION INTRAVENOUS at 17:48

## 2022-11-07 RX ADMIN — SODIUM CHLORIDE, POTASSIUM CHLORIDE, SODIUM LACTATE AND CALCIUM CHLORIDE: 600; 310; 30; 20 INJECTION, SOLUTION INTRAVENOUS at 22:06

## 2022-11-07 RX ADMIN — SODIUM CHLORIDE, POTASSIUM CHLORIDE, SODIUM LACTATE AND CALCIUM CHLORIDE: 600; 310; 30; 20 INJECTION, SOLUTION INTRAVENOUS at 14:56

## 2022-11-07 RX ADMIN — PHENYLEPHRINE HYDROCHLORIDE 100 MCG: 10 INJECTION INTRAVENOUS at 14:24

## 2022-11-07 RX ADMIN — LABETALOL HYDROCHLORIDE 10 MG: 5 INJECTION, SOLUTION INTRAVENOUS at 16:10

## 2022-11-07 RX ADMIN — SODIUM CHLORIDE, POTASSIUM CHLORIDE, SODIUM LACTATE AND CALCIUM CHLORIDE: 600; 310; 30; 20 INJECTION, SOLUTION INTRAVENOUS at 01:03

## 2022-11-07 RX ADMIN — DEXAMETHASONE SODIUM PHOSPHATE 10 MG: 10 INJECTION INTRAMUSCULAR; INTRAVENOUS at 14:19

## 2022-11-07 RX ADMIN — HEPARIN SODIUM 5000 UNITS: 5000 INJECTION INTRAVENOUS; SUBCUTANEOUS at 21:54

## 2022-11-07 RX ADMIN — PHENYLEPHRINE HYDROCHLORIDE 100 MCG: 10 INJECTION INTRAVENOUS at 14:38

## 2022-11-07 ASSESSMENT — ENCOUNTER SYMPTOMS
ABDOMINAL PAIN: 1
CHEST TIGHTNESS: 0
COLOR CHANGE: 0
SHORTNESS OF BREATH: 0
VOMITING: 0
NAUSEA: 0

## 2022-11-07 ASSESSMENT — PAIN SCALES - GENERAL
PAINLEVEL_OUTOF10: 10
PAINLEVEL_OUTOF10: 0
PAINLEVEL_OUTOF10: 8

## 2022-11-07 ASSESSMENT — PAIN DESCRIPTION - LOCATION: LOCATION: ABDOMEN

## 2022-11-07 NOTE — OP NOTE
[address]                      [facility], [address] [facility]                                OPERATIVE REPORT    PATIENT NAME: Darlyn Martinez                     :        1935  MED REC NO:   [id number]                            ROOM:  ACCOUNT NO:   [de-identified]                           ADMIT DATE: 2022  PROVIDER:     Maria Fernanda Mcdowell MD    DATE OF PROCEDURE:  2022    PREOPERATIVE DIAGNOSIS:  High-grade intestinal obstruction. POSTOPERATIVE DIAGNOSIS:  High-grade intestinal obstruction. PROCEDURES PERFORMED:  1. Exploratory laparotomy. 2.  Enterolysis times 60 minutes. SURGEON:  Maria Fernanda Mcdowell MD    ASSISTANT:  Ms. Annie Musa. ANESTHESIA:  1. General endotracheal anesthesia. 2.  Bilateral rectus sheath block. ESTIMATED BLOOD LOSS:  100 mL. SPECIMENS:  None. COMPLICATIONS:  None. INDICATIONS:  An 70-year-old female with a high-grade intestinal  obstruction. We discussed conservative versus surgical treatment, but  based on her clinical course as well as CT findings, she agreed to  proceed with laparotomy for lysis of adhesions, possible bowel  resection. Risks of the procedure were described well to her and her family given  her age and comorbidities. She elected to proceed. Consent obtained. OPERATIVE PROCEDURE:  She was taken to the operating room, placed in the  supine position. General endotracheal anesthesia was administered. Nasogastric tube was in place. Whitlock catheter was placed. Abdomen was  prepped and draped with a ChloraPrep-containing solution. Bilateral  rectus sheath block placed. A time-out taken for appropriate  verification. Midline incision was made. Subcutaneous tissues were incised to the  anterior fascia, which was divided in the midline. A plane was created.   For the next 60 minutes, enterolysis was performed of the omentum off  the anterior abdominal wall, as well as, all the adhesions from the  ligament of Treitz to the ileocecal valve. There were significant  amount of bowel adhesions with small bowel stuck in the pelvis, which  was taken down under direct visualization. I was able to free the entire intestine after 60 minutes and I was able  to run the bowel from the ligament of Treitz to the ileocecal valve  without any further issues. All adhesions were taken down. The colon  felt fine other than a stool present throughout. There were no injuries  to the intestine. The nasogastric tube was verified. Irrigation was performed and excellent hemostasis was assured. Two pieces of Seprafilm were placed; one in the pelvis and one between  the omentum and the anterior abdominal wall. The bowel was placed in  normal anatomic position. The lap, needle, and instrument counts were all correct. The fascia was closed with a combination of 0 Vicryl and 0 Prolene  suture, number 2 Prolene external retention sutures were used for  fascial integrity as well. Skin was closed with staples. Dressings  were applied. Following closure; the lap, needle, and instrument counts  were again all correct. The patient was extubated and taken to Recovery for postop monitoring.         Karen Elias MD    D: 11/07/2022 15:42:10       T: 11/07/2022 15:44:30     [email or web address]  Job#: [id number]     Doc#: [id number]    CC:

## 2022-11-07 NOTE — PROGRESS NOTES
Hospitalist Daily Progress Note  Name: Trixie Rausch  Age: 80 y.o. Gender: female  CodeStatus: Full Code  Allergies: Phenazopyridine    Chief Complaint:Abdominal Pain (Severe, radiates to back, since this am)    Primary Care Provider: Gt Durham PA-C  InpatientTreatment Team: Treatment Team: Attending Provider: Dileep Olivo DO; Consulting Physician: Cynthia Mcnamara MD; Consulting Physician: Lyssa Venegas DO; Surgeon: Cynthia Mcnamara MD; : Isidor Hamman, RN; Registered Nurse: Katie Gold RN; Utilization Reviewer: Ashwin Flood RN  Admission Date: 11/5/2022      Subjective: Patient is seen evaluated bedside. NG tube in place with about 400 mL of brown aspirate appreciated. Patient states that she is passing small amounts of gas but no BMs has persistent abdominal pain and notes that she has had a multitude of abdominal surgeries in the past with appendectomy cholecystectomy tubal ligation and total abdominal hysterectomy. She is quite anxious about surgery but is agreeable    Physical Exam  Constitutional:       Appearance: Normal appearance. She is ill-appearing. She is not diaphoretic. HENT:      Head: Normocephalic and atraumatic. Nose: Nose normal.      Mouth/Throat:      Mouth: Mucous membranes are moist.      Pharynx: Oropharynx is clear. Eyes:      Conjunctiva/sclera: Conjunctivae normal.      Pupils: Pupils are equal, round, and reactive to light. Cardiovascular:      Rate and Rhythm: Normal rate. Heart sounds: No murmur heard. No friction rub. No gallop. Pulmonary:      Breath sounds: No wheezing, rhonchi or rales. Abdominal:      General: There is distension. Tenderness: There is abdominal tenderness. There is guarding. Musculoskeletal:         General: Normal range of motion. Neurological:      General: No focal deficit present. Mental Status: She is alert and oriented to person, place, and time.    Psychiatric:         Mood and Affect: Mood normal.         Thought Content: Thought content normal.         Judgment: Judgment normal.       Review of Systems  14 point ROS reviewed negative except for as above  Medications:  Reviewed    Infusion Medications:    lactated ringers      sodium chloride      sodium chloride      lactated ringers 1,000 mL (11/07/22 1356)     Scheduled Medications:    metoprolol  2.5 mg IntraVENous Q6H    sodium chloride flush  5-40 mL IntraVENous 2 times per day    amLODIPine  2.5 mg Oral Daily    [Held by provider] aspirin  81 mg Oral Daily    gabapentin  200 mg Oral Nightly    oxybutynin  5 mg Oral BID    pravastatin  40 mg Oral Daily    heparin (porcine)  5,000 Units SubCUTAneous 3 times per day    pantoprazole (PROTONIX) 40 mg injection  40 mg IntraVENous Q12H    sodium chloride flush  5-40 mL IntraVENous 2 times per day     PRN Meds: sodium chloride flush, sodium chloride, meperidine, fentanNYL, fentanNYL, oxyCODONE **OR** oxyCODONE, ondansetron, prochlorperazine, diphenhydrAMINE, labetalol **OR** hydrALAZINE, dicyclomine, hydrALAZINE, phenol, sodium chloride flush, sodium chloride, ondansetron **OR** ondansetron, polyethylene glycol, acetaminophen **OR** acetaminophen, HYDROmorphone    Labs:   Recent Labs     11/05/22 1715 11/06/22  0517 11/07/22  0625   WBC 6.7 5.3 6.0   HGB 14.3 13.3 12.8   HCT 43.4 39.5 38.2    132 126*     Recent Labs     11/05/22  1715 11/06/22  0517 11/07/22  0625    143 142   K 4.0 4.5 3.6    105 104   CO2 25 20 25   BUN 17 18 13   CREATININE 0.62 0.55 0.42*   CALCIUM 10.0* 9.1 8.4*     Recent Labs     11/05/22 1715   AST 21   ALT 17   BILITOT 0.6   ALKPHOS 67     No results for input(s): INR in the last 72 hours.   Recent Labs     11/05/22 1715   TROPONINI <0.010       Urinalysis:   Lab Results   Component Value Date/Time    NITRU Negative 11/05/2022 04:45 PM    WBCUA 10-20 11/05/2022 04:45 PM    BACTERIA Negative 11/05/2022 04:45 PM    RBCUA 0-2 11/05/2022 04:45 PM BLOODU Negative 11/05/2022 04:45 PM    SPECGRAV 1.064 11/05/2022 04:45 PM    GLUCOSEU Negative 11/05/2022 04:45 PM       Radiology:   Most recent    Chest CT      WITH CONTRAST:No results found for this or any previous visit. WITHOUT CONTRAST: No results found for this or any previous visit. CXR      2-view: Results for orders placed during the hospital encounter of 03/22/19    XR CHEST STANDARD (2 VW)    Narrative  EXAMINATION: XR CHEST (2 VW). DATE AND TIME:3/24/2019 1:00 PM    CLINICAL HISTORY: Shortness of breath   Eval for new infiltrate    COMPARISONS: 3/22/2019    FINDINGS: Right paratracheal soft tissue density consistent with thyroid lesion. This is unchanged. Heart size normal. Lungs clear. Pleural angles smooth. Bones intact. Impression  NO  ACTIVE LUNG DISEASE. Portable: Results for orders placed during the hospital encounter of 03/22/19    XR CHEST PORTABLE    Narrative  Portable chest radiograph    History: Fever    Technique: AP portable view of the chest obtained. Comparison: None available    Findings:    Atherosclerotic calcification of the thoracic aorta. Heart size is at the upper limits of normal. Coarsening of the pulmonary interstitium. Linear bibasilar pulmonary opacities clinical subsegmental atelectasis. No pneumothorax, pleural effusion, or  focal consolidation. Degenerative changes of the spine. Impression  No acute intrathoracic process. Echo No results found for this or any previous visit. Assessment/Plan:    Active Hospital Problems    Diagnosis Date Noted    [facility] [Y77.[facility]] 11/05/2022     Priority: Medium     Small bowel obstruction: Consult to general surgery with resection planned for today. Nonnarcotic analgesia as tolerated. Continue NPO. Continue NG tube to low intermittent suction. Change metoprolol to 2.5 mg IV every 6 hours.   Continue IV fluids  mL/h    Atypical chest pain with a history of HCM: Cardiology consulted echo pending. Metoprolol 2.5 mg every 6 hours ordered. Appreciate cardiac recommendations regarding restratification. Continue optimized electrolytes. Continue statin    Stress ulcer probe access IV Protonix    DVT prophylaxis per surgery. Scd now    Additional work up or/and treatment plan may be added today or then after based on clinical progression. I am managing a portion of pt care. Some medical issues are handled byother specialists. Additional work up and treatment should be done in out pt setting by pt PCP and other out pt providers. In addition to examining and evaluating pt, I spent additional time explaining care, normaland abnormal findings, and treatment plan. All of pt questions were answered. Counseling, diet and education were provided. Case will be discussed with nursing staff when appropriate. Family will be updated if and whenappropriate.       Electronically signed by Sharona Solomon DO on 11/7/2022 at 4:14 PM

## 2022-11-07 NOTE — ANESTHESIA PRE PROCEDURE
Department of Anesthesiology  Preprocedure Note       Name:  Trixie Rausch   Age:  80 y.o.  :  1935                                          MRN:  [id number]         Date:  2022      Surgeon: Behzad Hammer):  Cynthia Mcnamara MD    Procedure: Procedure(s):  EXPLORATORY LAPAROTOMY LYSIS OF ADHESIONS POSSIBLE BOWEL OBSTRUCTION  ROOM 474    Medications prior to admission:   Prior to Admission medications    Medication Sig Start Date End Date Taking?  Authorizing Provider   aspirin 81 MG EC tablet Take 81 mg by mouth daily 12/10/19  Yes Historical Provider, MD   amLODIPine (NORVASC) 2.5 MG tablet Take 2.5 mg by mouth daily 22  Yes Historical Provider, MD   diclofenac sodium (VOLTAREN) 1 % GEL Apply 2 g topically 4 times daily 19  Yes Historical Provider, MD   nitroGLYCERIN (NITROSTAT) 0.4 MG SL tablet Place 0.4 mg under the tongue 22  Yes Historical Provider, MD   melatonin 10 MG CAPS capsule Take 10 mg by mouth daily    Historical Provider, MD   mupirocin (BACTROBAN) 2 % ointment apply to the affected area of the left nasal passage three times daily for 10 days as directed 22   Historical Provider, MD   dicyclomine (BENTYL) 10 MG capsule Take 1 capsule by mouth every 6 hours as needed (cramps) 10/13/20   Shannan Boswell,    ondansetron (ZOFRAN ODT) 4 MG disintegrating tablet Take 1 tablet by mouth every 8 hours as needed for Nausea 10/13/20   Shannan Boswell, DO   Cholecalciferol 2000 units CAPS Take one tab PO QD 18   Historical Provider, MD   gabapentin (NEURONTIN) 100 MG capsule 1 tab in the morning and 2 tabs at bedtime 19   Historical Provider, MD   diphenhydrAMINE (SOMINEX) 25 MG tablet Take by mouth  Patient not taking: Reported on 2022   Historical Provider, MD   oxybutynin (DITROPAN) 5 MG tablet Take 5 mg by mouth 2 times daily    Historical Provider, MD   metoprolol tartrate (LOPRESSOR) 25 MG tablet Take 25 mg by mouth 2 times daily    Historical Provider, MD pravastatin (PRAVACHOL) 40 MG tablet Take 40 mg by mouth daily    Historical Provider, MD   acetaminophen (TYLENOL) 500 MG tablet Take 500 mg by mouth as needed for Pain    Historical Provider, MD   NONFORMULARY Indications: Instaflex daily  Patient not taking: Reported on 11/5/2022    Historical Provider, MD   NONFORMULARY Indications: Sleep Aid nightly  Patient not taking: Reported on 11/5/2022    Historical Provider, MD   meloxicam (MOBIC) 7.5 MG tablet Take 1 tablet by mouth daily 7/29/17   Kenneth Silva MD       Current medications:    Current Facility-Administered Medications   Medication Dose Route Frequency Provider Last Rate Last Admin    metoprolol (LOPRESSOR) injection 2.5 mg  2.5 mg IntraVENous Q6H Chemo YOLI Sedar, DO   2.5 mg at 11/07/22 1113    amLODIPine (NORVASC) tablet 2.5 mg  2.5 mg Oral Daily Keisha Key MD        [Held by provider] aspirin EC tablet 81 mg  81 mg Oral Daily Keisha Key MD        gabapentin (NEURONTIN) capsule 200 mg  200 mg Oral Nightly Keisha Key MD        oxybutynin (DITROPAN) tablet 5 mg  5 mg Oral BID Keisha Key MD        pravastatin (PRAVACHOL) tablet 40 mg  40 mg Oral Daily Keisha Key MD        dicyclomine (BENTYL) capsule 10 mg  10 mg Oral Q6H PRN Keisha Key MD        hydrALAZINE (APRESOLINE) injection 10 mg  10 mg IntraVENous Q6H PRN Myra Hatchet, DO        heparin (porcine) injection 5,000 Units  5,000 Units SubCUTAneous 3 times per day Myra Hatchet, DO   5,000 Units at 11/07/22 0058    phenol 1.4 % mouth spray 1 spray  1 spray Mouth/Throat Q2H PRN Myra Hatchet, DO   1 spray at 11/07/22 0058    pantoprazole (PROTONIX) 40 mg in sodium chloride (PF) 0.9 % 10 mL injection  40 mg IntraVENous Q12H Myra Hatchet, DO   40 mg at 11/07/22 0704    sodium chloride flush 0.9 % injection 5-40 mL  5-40 mL IntraVENous 2 times per day Keisha Key MD   10 mL at 11/06/22 0028    sodium chloride flush 0.9 % injection 5-40 mL  5-40 mL IntraVENous PRN Merlinda Bowler Tari Vargas MD        0.9 % sodium chloride infusion   IntraVENous PRN Keisha Key MD        ondansetron (ZOFRAN-ODT) disintegrating tablet 4 mg  4 mg Oral Q8H PRN Keisha Key MD        Or    ondansetron [facility]) injection 4 mg  4 mg IntraVENous Q6H PRN Keisha Key MD        polyethylene glycol (GLYCOLAX) packet 17 g  17 g Oral Daily PRN Keisha Key MD        acetaminophen (TYLENOL) tablet 650 mg  650 mg Oral Q6H PRN Keisha Key MD        Or    acetaminophen (TYLENOL) suppository 650 mg  650 mg Rectal Q6H PRN Keisha Key MD        lactated ringers infusion   IntraVENous Continuous Keisha Key  mL/hr at 11/07/22 0633 Rate Verify at 11/07/22 0633    HYDROmorphone (DILAUDID) injection 0.25 mg  0.25 mg IntraVENous Q4H PRN Keisha Key MD   0.25 mg at 11/06/22 1451       Allergies: Allergies   Allergen Reactions    Phenazopyridine Nausea And Vomiting     Other reaction(s): GI Upset       Problem List:    Patient Active Problem List   Diagnosis Code    Nausea vomiting and diarrhea R11.2, R19.7    SBO (small bowel obstruction) ([facility] T47.734       Past Medical History:        Diagnosis Date    [facility]    bladder    Hyperlipidemia     Hypertension        Past Surgical History:        Procedure Laterality Date    ABDOMEN SURGERY      large intestine partial removal    APPENDECTOMY      CHOLECYSTECTOMY      HYSTERECTOMY (CERVIX STATUS UNKNOWN)         Social History:    Social History     Tobacco Use    Smoking status: Never    Smokeless tobacco: Not on file   Substance Use Topics    Alcohol use:  No                                Counseling given: Not Answered      Vital Signs (Current):   Vitals:    11/06/22 1451 11/06/22 1920 11/06/22 2055 11/07/22 1015   BP:   (!) 153/73 (!) 156/61   Pulse:  86 85 84   Resp: 18  18 18   Temp:  98.2 °F (36.8 °C) 98.2 °F (36.8 °C) 98.2 °F (36.8 °C)   TempSrc:  Oral Oral Oral   SpO2:    97%   Weight:       Height: BP Readings from Last 3 Encounters:   11/07/22 (!) 156/61   10/13/20 122/88   03/24/19 (!) 143/56       NPO Status:                                                                                 BMI:   Wt Readings from Last 3 Encounters:   11/06/22 134 lb 8 oz (61 kg)   10/13/20 140 lb (63.5 kg)   03/23/19 143 lb 1.6 oz (64.9 kg)     Body mass index is 25.41 kg/m². CBC:   Lab Results   Component Value Date/Time    WBC 6.0 11/07/2022 06:25 AM    RBC 3.91 11/07/2022 06:25 AM    HGB 12.8 11/07/2022 06:25 AM    HCT 38.2 11/07/2022 06:25 AM    MCV 97.5 11/07/2022 06:25 AM    RDW 13.6 11/07/2022 06:25 AM     11/07/2022 06:25 AM       CMP:   Lab Results   Component Value Date/Time     11/07/2022 06:25 AM    K 3.6 11/07/2022 06:25 AM    K 3.8 03/24/2019 06:21 AM     11/07/2022 06:25 AM    CO2 25 11/07/2022 06:25 AM    BUN 13 11/07/2022 06:25 AM    CREATININE 0.42 11/07/2022 06:25 AM    GFRAA >60 10/13/2020 01:39 PM    LABGLOM >60.0 11/07/2022 06:25 AM    GLUCOSE 81 11/07/2022 06:25 AM    PROT 7.1 11/05/2022 05:15 PM    CALCIUM 8.4 11/07/2022 06:25 AM    BILITOT 0.6 11/05/2022 05:15 PM    ALKPHOS 67 11/05/2022 05:15 PM    AST 21 11/05/2022 05:15 PM    ALT 17 11/05/2022 05:15 PM       POC Tests: No results for input(s): POCGLU, POCNA, POCK, POCCL, POCBUN, POCHEMO, POCHCT in the last 72 hours.     Coags:   Lab Results   Component Value Date/Time    PROTIME 13.4 10/13/2020 02:08 PM    INR 1.0 10/13/2020 02:08 PM    APTT 28.4 10/13/2020 02:08 PM       HCG (If Applicable): No results found for: PREGTESTUR, PREGSERUM, HCG, HCGQUANT     ABGs: No results found for: PHART, PO2ART, FHI4TLL, NHA9CZD, BEART, P9VUKRJG     Type & Screen (If Applicable):  No results found for: LABABO, LABRH    Drug/Infectious Status (If Applicable):  No results found for: HIV, HEPCAB    COVID-19 Screening (If Applicable): No results found for: COVID19        Anesthesia Evaluation  Patient summary reviewed and Nursing notes reviewed no history of anesthetic complications:   Airway: Mallampati: II  TM distance: >3 FB   Neck ROM: full  Mouth opening: > = 3 FB   Dental: normal exam         Pulmonary:Negative Pulmonary ROS and normal exam                               Cardiovascular:Negative CV ROS  Exercise tolerance: good (>4 METS),   (+) hypertension:, CAD:,       ECG reviewed               Beta Blocker:  Dose within 24 Hrs         Neuro/Psych:   Negative Neuro/Psych ROS              GI/Hepatic/Renal: Neg GI/Hepatic/Renal ROS            Endo/Other: Negative Endo/Other ROS             Pt had PAT visit. Abdominal:             Vascular: negative vascular ROS. Other Findings:           Anesthesia Plan      general     ASA 3 - emergent     (ETT)  Induction: intravenous. MIPS: Postoperative opioids intended and Prophylactic antiemetics administered. Anesthetic plan and risks discussed with patient. Plan discussed with CRNA.     Attending anesthesiologist reviewed and agrees with Pre Eval content      Post-op pain plan if not by surgeon: jayne Biswas MD   11/7/2022

## 2022-11-07 NOTE — CARE COORDINATION
This LSW met with patient at bedside this am. Patient and I discussed discharge plans- patient requesting that SNF referral be sent to [facility] as her  is not able to care for her at this time. I sent referral to [facility] SUZY COPE, [facility] liaison - awaiting acceptance at this time. MAIW / CM to follow.   Electronically signed by KWADWO Massey, ANDRES on 11/7/22 at 3:30 PM EST

## 2022-11-07 NOTE — CONSULTS
Consult Note  Patient: Dayana Dawkins  Unit/Bed: Y102/C811-47  YOB: 1935  MRN: [id number]  Acct: [de-identified]   Admitting Diagnosis: Small bowel obstruction ([facility] [address] [I54.320]  SBO (small bowel obstruction) ([facility] [address] [E86.297]  Date:  11/5/2022  Hospital Day: 2      Chief Complaint:  Abdominal pain/nausea    History of Present Illness: This is a pleasant 26-year-old  female with past medical history significant for apical hypertrophic cardiomyopathy followed by F cardiology, abnormal EKG, recent negative stress test in August 2022 at [facility], history of PSVT, history of bladder cancer, hypertension, dyslipidemia who presented to [facility] ER on 11/5/2022 with complaints of abdominal pain and nausea. Apparently, patient began experiencing severe sharp lower quadrant abdominal pain on the morning of presentation. She had no other associated symptoms. Upon presentation to the emergency room she was hemodynamically stable. CT of abdomen and pelvis showed dilated stomach and proximal small bowel loops with no wall thickening, suggesting possible small bowel obstruction. NG tube was placed in ER and consultation made to general surgery with admission to internal medicine. Patient was evaluated by general surgery, Dr. Ashlee Allen, yesterday with recommendation for surgical intervention as likelihood of successful nonoperative management was low. After discussion with multiple family members, patient decided to proceed with surgical intervention. Cardiology consult was placed for preoperative risk stratification. At time of my evaluation today, patient is sitting on bedside commode. She complains of ongoing abdominal discomfort. NG tube is in place. She reports an episode of left-sided pinching pain this morning which lasted a few minutes and then resolved. She reports occasional palpitations. She denies shortness of breath, dizziness, lightheadedness, syncope, fever or chills. A.m.  EKG reviewed showing sinus rhythm with a sinus arrhythmia with deep T wave inversions in leads V2 through V6 and subtle T wave inversions in inferior lateral leads but EKG overall unchanged when compared to prior EKG from 2019. Echocardiogram ordered this morning and pending.         Allergies   Allergen Reactions    Phenazopyridine Nausea And Vomiting     Other reaction(s): GI Upset       Current Facility-Administered Medications   Medication Dose Route Frequency Provider Last Rate Last Admin    metoprolol (LOPRESSOR) injection 2.5 mg  2.5 mg IntraVENous Q6H Chemo KENT Sedar, DO   2.5 mg at 11/07/22 1113    amLODIPine (NORVASC) tablet 2.5 mg  2.5 mg Oral Daily Rambo Hernandez MD        [Held by provider] aspirin EC tablet 81 mg  81 mg Oral Daily Rambo Hernandez MD        gabapentin (NEURONTIN) capsule 200 mg  200 mg Oral Nightly Rambo Hernandez MD        oxybutynin (DITROPAN) tablet 5 mg  5 mg Oral BID Rambo Hernandez MD        pravastatin (PRAVACHOL) tablet 40 mg  40 mg Oral Daily Rambo Hernandez MD        dicyclomine (BENTYL) capsule 10 mg  10 mg Oral Q6H PRN Rambo Hernandez MD        hydrALAZINE (APRESOLINE) injection 10 mg  10 mg IntraVENous Q6H PRN Aidee Duff, DO        heparin (porcine) injection 5,000 Units  5,000 Units SubCUTAneous 3 times per day Aidee Duiris, DO   5,000 Units at 11/07/22 0058    phenol 1.4 % mouth spray 1 spray  1 spray Mouth/Throat Q2H PRN Aidee Duff, DO   1 spray at 11/07/22 0058    pantoprazole (PROTONIX) 40 mg in sodium chloride (PF) 0.9 % 10 mL injection  40 mg IntraVENous Q12H Aidee Duff, DO   40 mg at 11/07/22 0704    sodium chloride flush 0.9 % injection 5-40 mL  5-40 mL IntraVENous 2 times per day Rambo Hernandez MD   10 mL at 11/06/22 0028    sodium chloride flush 0.9 % injection 5-40 mL  5-40 mL IntraVENous PRN Rambo Hernandez MD        0.9 % sodium chloride infusion   IntraVENous PRN Rambo Hernandez MD        ondansetron (ZOFRAN-ODT) disintegrating tablet 4 mg  4 mg Oral Q8H PRN Rambo Hernandez MD Or    ondansetron (ZOFRAN) injection 4 mg  4 mg IntraVENous Q6H PRN Allen Finch MD        polyethylene glycol (GLYCOLAX) packet 17 g  17 g Oral Daily PRN Allen Finch MD        acetaminophen (TYLENOL) tablet 650 mg  650 mg Oral Q6H PRN Allen Finch MD        Or    acetaminophen (TYLENOL) suppository 650 mg  650 mg Rectal Q6H PRN Allen Finch MD        lactated ringers infusion   IntraVENous Continuous Allen Finch  mL/hr at 11/07/22 0926 Rate Verify at 11/07/22 8482    HYDROmorphone (DILAUDID) injection 0.25 mg  0.25 mg IntraVENous Q4H PRN Allen Finch MD   0.25 mg at 11/06/22 1451       PMHx:  Past Medical History:   Diagnosis Date    Cancer ([facility] [address] 1999    bladder    Hyperlipidemia     Hypertension        PSHx:  Past Surgical History:   Procedure Laterality Date    ABDOMEN SURGERY      large intestine partial removal    APPENDECTOMY      CHOLECYSTECTOMY      HYSTERECTOMY (CERVIX STATUS UNKNOWN)         Social Hx:  Social History     Socioeconomic History    Marital status:      Spouse name: None    Number of children: None    Years of education: None    Highest education level: None   Tobacco Use    Smoking status: Never   Substance and Sexual Activity    Alcohol use: No    Drug use: No       Family Hx:  Family History   Problem Relation Age of Onset    Coronary Art Dis Brother        Review of Systems:   Review of Systems   Constitutional:  Negative for fatigue. HENT:  Positive for congestion. Respiratory:  Negative for chest tightness and shortness of breath. Cardiovascular:  Positive for chest pain (brief left sided pinching pain this AM) and palpitations (occasional). Negative for leg swelling. Gastrointestinal:  Positive for abdominal pain. Negative for nausea and vomiting. Genitourinary:  Negative for difficulty urinating. Musculoskeletal:  Negative for arthralgias. Skin:  Negative for color change. Neurological:  Negative for dizziness, syncope and light-headedness. Psychiatric/Behavioral:  Negative for agitation. Physical Examination:    BP (!) 156/61   Pulse 84   Temp 98.2 °F (36.8 °C) (Oral)   Resp 18   Ht 5' 1\" (1.549 m)   Wt 134 lb 8 oz (61 kg)   SpO2 97%   BMI 25.41 kg/m²    Physical Exam  Constitutional:       General: She is not in acute distress. HENT:      Head: Normocephalic and atraumatic. Comments: NG tube in place  Cardiovascular:      Rate and Rhythm: Normal rate. Rhythm irregular. Pulmonary:      Effort: Pulmonary effort is normal. No respiratory distress. Breath sounds: No wheezing, rhonchi or rales. Musculoskeletal:      Cervical back: Normal range of motion and neck supple. Right lower leg: No edema. Left lower leg: No edema. Skin:     General: Skin is warm and dry. Neurological:      General: No focal deficit present. Mental Status: She is alert. Cranial Nerves: No cranial nerve deficit.    Psychiatric:         Mood and Affect: Mood normal.         Behavior: Behavior normal.       LABS:  CBC:  Lab Results   Component Value Date/Time    WBC 6.0 11/07/2022 06:25 AM    RBC 3.91 11/07/2022 06:25 AM    HGB 12.8 11/07/2022 06:25 AM    HCT 38.2 11/07/2022 06:25 AM    MCV 97.5 11/07/2022 06:25 AM    MCH 32.7 11/07/2022 06:25 AM    MCHC 33.5 11/07/2022 06:25 AM    RDW 13.6 11/07/2022 06:25 AM     11/07/2022 06:25 AM     CBC with Differential:   Lab Results   Component Value Date/Time    WBC 6.0 11/07/2022 06:25 AM    RBC 3.91 11/07/2022 06:25 AM    HGB 12.8 11/07/2022 06:25 AM    HCT 38.2 11/07/2022 06:25 AM     11/07/2022 06:25 AM    MCV 97.5 11/07/2022 06:25 AM    MCH 32.7 11/07/2022 06:25 AM    MCHC 33.5 11/07/2022 06:25 AM    RDW 13.6 11/07/2022 06:25 AM    LYMPHOPCT 21.3 11/07/2022 06:25 AM    MONOPCT 9.8 11/07/2022 06:25 AM    BASOPCT 0.4 11/07/2022 06:25 AM    MONOSABS 0.6 11/07/2022 06:25 AM    LYMPHSABS 1.3 11/07/2022 06:25 AM    EOSABS 0.0 11/07/2022 06:25 AM    BASOSABS 0.0 11/07/2022 06:25 AM     CMP:    Lab Results   Component Value Date/Time     11/07/2022 06:25 AM    K 3.6 11/07/2022 06:25 AM    K 3.8 03/24/2019 06:21 AM     11/07/2022 06:25 AM    CO2 25 11/07/2022 06:25 AM    BUN 13 11/07/2022 06:25 AM    CREATININE 0.42 11/07/2022 06:25 AM    GFRAA >60 10/13/2020 01:39 PM    LABGLOM >60.0 11/07/2022 06:25 AM    GLUCOSE 81 11/07/2022 06:25 AM    PROT 7.1 11/05/2022 05:15 PM    LABALBU 4.5 11/05/2022 05:15 PM    CALCIUM 8.4 11/07/2022 06:25 AM    BILITOT 0.6 11/05/2022 05:15 PM    ALKPHOS 67 11/05/2022 05:15 PM    AST 21 11/05/2022 05:15 PM    ALT 17 11/05/2022 05:15 PM     BMP:    Lab Results   Component Value Date/Time     11/07/2022 06:25 AM    K 3.6 11/07/2022 06:25 AM    K 3.8 03/24/2019 06:21 AM     11/07/2022 06:25 AM    CO2 25 11/07/2022 06:25 AM    BUN 13 11/07/2022 06:25 AM    LABALBU 4.5 11/05/2022 05:15 PM    CREATININE 0.42 11/07/2022 06:25 AM    CALCIUM 8.4 11/07/2022 06:25 AM    GFRAA >60 10/13/2020 01:39 PM    LABGLOM >60.0 11/07/2022 06:25 AM    GLUCOSE 81 11/07/2022 06:25 AM     Magnesium:    Lab Results   Component Value Date/Time    MG 2.1 11/05/2022 05:15 PM     Troponin:    Lab Results   Component Value Date/Time    TROPONINI <0.010 11/05/2022 05:15 PM       Radiology:  CT ABDOMEN PELVIS W IV CONTRAST Additional Contrast? None    Result Date: 11/7/2022  EXAMINATION: CT OF THE ABDOMEN AND PELVIS WITH CONTRAST 11/5/2022 6:27 pm TECHNIQUE: CT of the abdomen and pelvis was performed with the administration of intravenous contrast. Multiplanar reformatted images are provided for review. Automated exposure control, iterative reconstruction, and/or weight based adjustment of the mA/kV was utilized to reduce the radiation dose to as low as reasonably achievable. COMPARISON: None.  HISTORY: ORDERING SYSTEM PROVIDED HISTORY: Lower ab pain TECHNOLOGIST PROVIDED HISTORY: Additional Contrast?->None Reason for exam:->Lower ab pain Decision Support Exception - unselect if not a suspected or confirmed emergency medical condition->Emergency Medical Condition (MA) What reading provider will be dictating this exam?->CRC FINDINGS: Lower Chest: The lung bases are grossly clear. Organs: The liver is homogeneous in appearance. Spleen is unremarkable. Gallbladder is been surgically removed. There is no intrahepatic or extrahepatic biliary ductal dilatation. The pancreas is heterogeneous. No underlying mass or lesion. Both adrenal glands are within normal limits. Small cyst identified on the left kidney. No stones or distension seen in the renal collecting system. Small cyst on the right kidney. GI/Bowel: Stomach is mildly distended with fluid. No wall thickening. There is some mildly dilated proximal jejunum suggestive of a proximal to mid small bowel obstruction. The transition point and change in caliber is not well seen. The distal small bowel is decompressed. There is stool seen scattered diffusely throughout the colon. Diverticulosis with no evidence of obvious obstruction. No gross free intraperitoneal air. Pelvis: The bladder is unremarkable with no wall thickening. Uterus has been surgically removed. Peritoneum/Retroperitoneum: No abdominal retroperitoneal lymphadenopathy. No free fluid or free air. No abnormal mass or fluid collections identified. Bones/Soft Tissues: The bony structures reveal degenerative changes seen within the spine and pelvis. Small umbilical hernia containing fat only. Dilated stomach and proximal small bowel loops with no wall thickening. Findings suggesting possible small bowel obstruction with change in caliber suggestive of the lower abdomen. No evidence of free intraperitoneal air. Echocardiogram 3/22/21 at [facility]:  CONCLUSIONS:   - Exam indication: Abnormal EKG   - The left ventricle is normal in size. There is mild apical left ventricular   hypertrophy.  Left ventricular systolic function is normal. EF = 66 ± 5% (2D biplane) -Apical hypertrophy in the pattern of apical HCM. - The right ventricle is normal in size. Right ventricular systolic function is   normal.   - Exam was compared with the prior  echocardiographic exam performed on 8/18/20. MV prolapse not seen on today's ECHO. Electronically signed by Mirtha Maldonado MD on 3/22/2021 at 4:37:50 PM    Stress test at [facility] on 8/26/22:   PATIENT:   Name: Evaristo Sagastume   MRN: [id number]   Age: 80 years   Gender: F     CONCLUSIONS:    1. SPECT Perfusion Study: Normal.    2. There is no scintigraphic evidence for inducible ischemia. 3. No evidence of scarred myocardium. 4. Left ventricle is small. The left ventricle systolic function is   normal.    5. Right ventricle is normal in size. The right ventricle systolic   function is normal.    6. This is a low risk scan. Gated Stress FBP    LVEF % 70          EKG 11/7/22: SR 84, sinus arrhythmia, LVH, ST depression with deep T wave inversion in V2-V6, subtle T wave inversion inferolateral leads, QTc 470ms--unchanged when compared to EKG in 2019      Assessment:    Active Hospital Problems    Diagnosis Date Noted    SBO (small bowel obstruction) ([facility] [address] [K56.609] 11/05/2022     Priority: Medium     Pre-op cardiac risk stratification   Small bowel obstruction  Atypical chest pain episode  Hx apical HCM with mild apical LVH on echo at [facility] 3/22/21--follows with [facility] cardiology, Dr. Ayden Mann  Abnormal EKG--unchanged since 2019  S/p stress test 8/26/22 at [facility] which was negative for ischemia  Hx PSVT  Dyslipidemia  HTN    Plan:  Continue current medications-Lopressor 2.5 mg IV every 6 hours for now while n.p.o., Protonix 40 mg IV every 12 hours, heparin 5000 units subcu every 8 hours  Plan to resume home cardiac medications when patient able to take p.o.   Check echocardiogram  Place on telemetry monitoring  Maintain potassium greater than 4, magnesium greater than 2  GI/DVT prophylaxis  General surgery recommendations regarding small bowel obstruction and possible surgical intervention  Hospitalist recommendations  Patient considered intermediate risk of developing perioperative cardiovascular events but acceptable to proceed with surgery. Awaiting completion of preoperative echocardiogram.  She has no active cardiac issues or complaints including angina, uncontrolled arrhythmias or signs of decompensated heart failure.   Further recommendations to follow            Electronically signed by Sharman Phalen, PA on 11/7/2022 at 1:09 PM

## 2022-11-07 NOTE — PROGRESS NOTES
Patient ID:  Manjeet Wiley  [id number]  37 y.o.  1935  BOVIE PAD SITE CLEAR AND INTACT PRE AND POST OP. TAKEN TO PACU,   ATTACHED TO MONITOR AND REPORT GIVEN TO RN.   VSS DRSG DRY AND INTACT        Electronically signed by Anne-Marie Machado RN on 11/7/2022

## 2022-11-07 NOTE — PROGRESS NOTES
15: 38 Admitted to PACU from OR monitor leads applied rec'd report and pt assessed. 15:40 Dr. Yady Emanuel at bedside to asses pt no new orders at this time. 16:00 Medicated with hydralazine for elevated B/P as per order. Will monitor closely. 16:10 B/P remains elevated medicated with labetalol as ordered. 16:18 medicated for C/O pain unable to state number, will continue to monitor. 16:28 Report to oncoming RN.

## 2022-11-07 NOTE — ANESTHESIA POSTPROCEDURE EVALUATION
Department of Anesthesiology  Postprocedure Note    Patient: Abbe Ha  MRN: [id number]  YOB: 1935  Date of evaluation: 11/7/2022      Procedure Summary     Date: 11/07/22 Room / Location: [address] [place] [address]    Anesthesia Start: 8714 Anesthesia Stop: 5111    Procedure: EXPLORATORY LAPAROTOMY LYSIS OF ADHESIONS (Abdomen) Diagnosis:       Small bowel obstruction ([address]      (SMALL BOWEL OBSTRUCTION)    Surgeons: Saira Mccloud MD Responsible Provider: Eugenia Collado MD    Anesthesia Type: general ASA Status: 3 - Emergent          Anesthesia Type: No value filed.     Ryan Phase I:      Ryan Phase II:        Anesthesia Post Evaluation

## 2022-11-07 NOTE — BRIEF OP NOTE
Brief Postoperative Note      Patient: Mendel Haus  YOB: 1935  MRN: [id number]    Date of Procedure: 11/7/2022    Pre-Op Diagnosis: SMALL BOWEL OBSTRUCTION    Post-Op Diagnosis: Same       Procedure(s):  EXPLORATORY LAPAROTOMY LYSIS OF ADHESIONS    Surgeon(s):  Jacey Yanes MD    Assistant:  First Assistant: Emerita Winters    Anesthesia: General    Estimated Blood Loss (mL): 851    Complications: None    Specimens:   * No specimens in log *    Implants:  * No implants in log *      Drains:   NG/OG/NJ/NE Tube Nasogastric 16 fr Left nostril (Active)   Surrounding Skin Clean, dry & intact 11/07/22 0808   Securement device Adhesive based  11/07/22 0808   Status Suction-low intermittent 11/07/22 0808   Placement Verified Respiratory Status;Gastric Contents 11/07/22 0808   NG/OG/NJ/NE External Measurement (cm) 50 cm 11/07/22 0808   Drainage Appearance Bile 11/07/22 0808   Output (mL) 300 ml 11/07/22 0630       Urinary Catheter 11/07/22 Whitlock-Temperature (Active)       Findings: Adhesions throughout abdomen.   60 minutes enterolysis performed    Electronically signed by Lula Alicea MD on 11/7/2022 at 3:36 PM

## 2022-11-08 PROBLEM — Z86.79 HISTORY OF PSVT (PAROXYSMAL SUPRAVENTRICULAR TACHYCARDIA): Status: ACTIVE | Noted: 2022-11-08

## 2022-11-08 PROBLEM — R94.31 ABNORMAL EKG: Status: ACTIVE | Noted: 2022-11-08

## 2022-11-08 PROBLEM — R07.89 ATYPICAL CHEST PAIN: Status: ACTIVE | Noted: 2022-11-08

## 2022-11-08 PROBLEM — Z86.79 HISTORY OF HYPERTROPHIC CARDIOMYOPATHY: Status: ACTIVE | Noted: 2022-11-08

## 2022-11-08 PROBLEM — I10 HYPERTENSION: Status: ACTIVE | Noted: 2022-11-08

## 2022-11-08 LAB
ANION GAP SERPL CALCULATED.3IONS-SCNC: 17 MEQ/L (ref 9–15)
BASOPHILS ABSOLUTE: 0 K/UL (ref 0–0.2)
BASOPHILS RELATIVE PERCENT: 0 %
BUN BLDV-MCNC: 15 MG/DL (ref 8–23)
CALCIUM SERPL-MCNC: 8.2 MG/DL (ref 8.5–9.9)
CHLORIDE BLD-SCNC: 102 MEQ/L (ref 95–107)
CO2: 21 MEQ/L (ref 20–31)
CREAT SERPL-MCNC: 0.38 MG/DL (ref 0.5–0.9)
EKG ATRIAL RATE: 84 BPM
EKG P AXIS: 36 DEGREES
EKG P-R INTERVAL: 138 MS
EKG Q-T INTERVAL: 398 MS
EKG QRS DURATION: 82 MS
EKG QTC CALCULATION (BAZETT): 470 MS
EKG R AXIS: 38 DEGREES
EKG T AXIS: 192 DEGREES
EKG VENTRICULAR RATE: 84 BPM
EOSINOPHILS ABSOLUTE: 0 K/UL (ref 0–0.7)
EOSINOPHILS RELATIVE PERCENT: 0 %
GFR SERPL CREATININE-BSD FRML MDRD: >60 ML/MIN/{1.73_M2}
GLUCOSE BLD-MCNC: 121 MG/DL (ref 70–99)
HCT VFR BLD CALC: 35.8 % (ref 37–47)
HEMOGLOBIN: 12.2 G/DL (ref 12–16)
LYMPHOCYTES ABSOLUTE: 0.6 K/UL (ref 1–4.8)
LYMPHOCYTES RELATIVE PERCENT: 9.2 %
MCH RBC QN AUTO: 33 PG (ref 27–31.3)
MCHC RBC AUTO-ENTMCNC: 34 % (ref 33–37)
MCV RBC AUTO: 97.1 FL (ref 79.4–94.8)
MONOCYTES ABSOLUTE: 0.4 K/UL (ref 0.2–0.8)
MONOCYTES RELATIVE PERCENT: 5.8 %
NEUTROPHILS ABSOLUTE: 5.9 K/UL (ref 1.4–6.5)
NEUTROPHILS RELATIVE PERCENT: 85 %
PDW BLD-RTO: 13.7 % (ref 11.5–14.5)
PLATELET # BLD: 135 K/UL (ref 130–400)
POTASSIUM SERPL-SCNC: 3.2 MEQ/L (ref 3.4–4.9)
RBC # BLD: 3.69 M/UL (ref 4.2–5.4)
SODIUM BLD-SCNC: 140 MEQ/L (ref 135–144)
WBC # BLD: 7 K/UL (ref 4.8–10.8)

## 2022-11-08 PROCEDURE — 80048 BASIC METABOLIC PNL TOTAL CA: CPT

## 2022-11-08 PROCEDURE — 2580000003 HC RX 258: Performed by: COLON & RECTAL SURGERY

## 2022-11-08 PROCEDURE — A4216 STERILE WATER/SALINE, 10 ML: HCPCS | Performed by: COLON & RECTAL SURGERY

## 2022-11-08 PROCEDURE — 6360000002 HC RX W HCPCS: Performed by: COLON & RECTAL SURGERY

## 2022-11-08 PROCEDURE — 1210000000 HC MED SURG R&B

## 2022-11-08 PROCEDURE — 6360000002 HC RX W HCPCS: Performed by: PHYSICIAN ASSISTANT

## 2022-11-08 PROCEDURE — 94150 VITAL CAPACITY TEST: CPT

## 2022-11-08 PROCEDURE — 36415 COLL VENOUS BLD VENIPUNCTURE: CPT

## 2022-11-08 PROCEDURE — C9113 INJ PANTOPRAZOLE SODIUM, VIA: HCPCS | Performed by: COLON & RECTAL SURGERY

## 2022-11-08 PROCEDURE — 2700000000 HC OXYGEN THERAPY PER DAY

## 2022-11-08 PROCEDURE — 6360000002 HC RX W HCPCS: Performed by: INTERNAL MEDICINE

## 2022-11-08 PROCEDURE — 85025 COMPLETE CBC W/AUTO DIFF WBC: CPT

## 2022-11-08 PROCEDURE — 2580000003 HC RX 258: Performed by: INTERNAL MEDICINE

## 2022-11-08 PROCEDURE — 99024 POSTOP FOLLOW-UP VISIT: CPT | Performed by: COLON & RECTAL SURGERY

## 2022-11-08 PROCEDURE — 2500000003 HC RX 250 WO HCPCS: Performed by: COLON & RECTAL SURGERY

## 2022-11-08 PROCEDURE — APPSS30 APP SPLIT SHARED TIME 16-30 MINUTES: Performed by: PHYSICIAN ASSISTANT

## 2022-11-08 RX ORDER — POTASSIUM CHLORIDE 7.45 MG/ML
10 INJECTION INTRAVENOUS
Status: COMPLETED | OUTPATIENT
Start: 2022-11-08 | End: 2022-11-08

## 2022-11-08 RX ADMIN — HYDROMORPHONE HYDROCHLORIDE 0.25 MG: 1 INJECTION, SOLUTION INTRAMUSCULAR; INTRAVENOUS; SUBCUTANEOUS at 09:09

## 2022-11-08 RX ADMIN — HYDROMORPHONE HYDROCHLORIDE 0.25 MG: 1 INJECTION, SOLUTION INTRAMUSCULAR; INTRAVENOUS; SUBCUTANEOUS at 01:26

## 2022-11-08 RX ADMIN — HYDROMORPHONE HYDROCHLORIDE 0.5 MG: 1 INJECTION, SOLUTION INTRAMUSCULAR; INTRAVENOUS; SUBCUTANEOUS at 20:22

## 2022-11-08 RX ADMIN — HYDROMORPHONE HYDROCHLORIDE 0.25 MG: 1 INJECTION, SOLUTION INTRAMUSCULAR; INTRAVENOUS; SUBCUTANEOUS at 13:10

## 2022-11-08 RX ADMIN — METOPROLOL TARTRATE 2.5 MG: 5 INJECTION, SOLUTION INTRAVENOUS at 20:22

## 2022-11-08 RX ADMIN — METOPROLOL TARTRATE 2.5 MG: 5 INJECTION, SOLUTION INTRAVENOUS at 14:17

## 2022-11-08 RX ADMIN — METOPROLOL TARTRATE 2.5 MG: 5 INJECTION, SOLUTION INTRAVENOUS at 07:45

## 2022-11-08 RX ADMIN — HEPARIN SODIUM 5000 UNITS: 5000 INJECTION INTRAVENOUS; SUBCUTANEOUS at 14:16

## 2022-11-08 RX ADMIN — POTASSIUM CHLORIDE 10 MEQ: 7.45 INJECTION INTRAVENOUS at 13:05

## 2022-11-08 RX ADMIN — METOPROLOL TARTRATE 2.5 MG: 5 INJECTION, SOLUTION INTRAVENOUS at 01:15

## 2022-11-08 RX ADMIN — POTASSIUM CHLORIDE: 2 INJECTION, SOLUTION, CONCENTRATE INTRAVENOUS at 20:30

## 2022-11-08 RX ADMIN — SODIUM CHLORIDE, POTASSIUM CHLORIDE, SODIUM LACTATE AND CALCIUM CHLORIDE: 600; 310; 30; 20 INJECTION, SOLUTION INTRAVENOUS at 09:12

## 2022-11-08 RX ADMIN — SODIUM CHLORIDE, PRESERVATIVE FREE 40 MG: 5 INJECTION INTRAVENOUS at 17:44

## 2022-11-08 RX ADMIN — Medication 10 ML: at 20:28

## 2022-11-08 RX ADMIN — SODIUM CHLORIDE, PRESERVATIVE FREE 40 MG: 5 INJECTION INTRAVENOUS at 06:17

## 2022-11-08 RX ADMIN — POTASSIUM CHLORIDE 10 MEQ: 7.45 INJECTION INTRAVENOUS at 11:53

## 2022-11-08 RX ADMIN — HEPARIN SODIUM 5000 UNITS: 5000 INJECTION INTRAVENOUS; SUBCUTANEOUS at 06:17

## 2022-11-08 RX ADMIN — Medication 10 ML: at 07:46

## 2022-11-08 RX ADMIN — HEPARIN SODIUM 5000 UNITS: 5000 INJECTION INTRAVENOUS; SUBCUTANEOUS at 20:22

## 2022-11-08 RX ADMIN — POTASSIUM CHLORIDE: 2 INJECTION, SOLUTION, CONCENTRATE INTRAVENOUS at 11:49

## 2022-11-08 ASSESSMENT — PAIN SCALES - GENERAL
PAINLEVEL_OUTOF10: 5
PAINLEVEL_OUTOF10: 3
PAINLEVEL_OUTOF10: 6
PAINLEVEL_OUTOF10: 7
PAINLEVEL_OUTOF10: 2
PAINLEVEL_OUTOF10: 0
PAINLEVEL_OUTOF10: 5
PAINLEVEL_OUTOF10: 5

## 2022-11-08 ASSESSMENT — PAIN DESCRIPTION - ORIENTATION
ORIENTATION: LEFT

## 2022-11-08 ASSESSMENT — PAIN DESCRIPTION - DESCRIPTORS
DESCRIPTORS: ACHING

## 2022-11-08 ASSESSMENT — ENCOUNTER SYMPTOMS
VOMITING: 0
SHORTNESS OF BREATH: 0
COLOR CHANGE: 0
CHEST TIGHTNESS: 0
NAUSEA: 0
ABDOMINAL PAIN: 1

## 2022-11-08 ASSESSMENT — PAIN DESCRIPTION - FREQUENCY: FREQUENCY: CONTINUOUS

## 2022-11-08 ASSESSMENT — PAIN DESCRIPTION - LOCATION
LOCATION: ABDOMEN

## 2022-11-08 ASSESSMENT — PAIN DESCRIPTION - PAIN TYPE
TYPE: ACUTE PAIN
TYPE: ACUTE PAIN

## 2022-11-08 NOTE — PROGRESS NOTES
Pt Name: Genie Record Number: [id number]  Date of Birth 1935   Admit date 2022  5:06 PM  Today's Date: 2022     ASSESSMENT  1. Hospital day # 3  2 postop day #1 laparotomy with lysis of adhesions    PLAN  1. Continue NG tube  2. Await bowel function  3. Pain control    SUBJECTIVE  Chief complaint: Follow-up laparotomy  Afebrile, vital signs are stable. She denies any nausea or vomiting, has not passed flatus or had a bowel movement. She is tolerating a Diet NPO Exceptions are: Ice Chips, Popsicles. Her pain is well controlled on current medications. has a past medical history of Cancer ([address] Hyperlipidemia, and Hypertension. CURRENT MEDS  Scheduled Meds:   metoprolol  2.5 mg IntraVENous Q6H    amLODIPine  2.5 mg Oral Daily    [Held by provider] aspirin  81 mg Oral Daily    gabapentin  200 mg Oral Nightly    oxybutynin  5 mg Oral BID    pravastatin  40 mg Oral Daily    heparin (porcine)  5,000 Units SubCUTAneous 3 times per day    pantoprazole (PROTONIX) 40 mg injection  40 mg IntraVENous Q12H    sodium chloride flush  5-40 mL IntraVENous 2 times per day     Continuous Infusions:   lactated ringers Stopped (22 174)    sodium chloride      lactated ringers 100 mL/hr at 22     PRN Meds:. HYDROmorphone **OR** HYDROmorphone, ketorolac, dicyclomine, hydrALAZINE, phenol, sodium chloride flush, sodium chloride, ondansetron **OR** ondansetron, polyethylene glycol, acetaminophen **OR** acetaminophen, HYDROmorphone    OBJECTIVE  CURRENT VITALS:  height is 5' 1\" (1.549 m) and weight is 134 lb (60.8 kg). Her temperature is 97.9 °F (36.6 °C). Her blood pressure is 122/52 (abnormal) and her pulse is 85. Her respiration is 18 and oxygen saturation is 99%.    Temperature Range (24h):Temp: 97.9 °F (36.6 °C) Temp  Av.8 °F (36.6 °C)  Min: 97 °F (36.1 °C)  Max: 98.8 °F (37.1 °C)  BP Range (71S): Systolic (97XIH), IYM:070 , Min:122 , REQ:909     Diastolic (95AKP), ZNP:69, Min:48, Max:95    Pulse Range (24h): Pulse  Av.2  Min: 68  Max: 93  Respiration Range (24h): Resp  Av.5  Min: 12  Max: 23    GENERAL: alert, no distress  LUNGS: clear to ausculation, without wheezes, rales or rhonci  HEART: normal rate and regular rhythm  ABDOMEN: non-distended, bowel sounds present in all 4 quadrants, and no guarding or peritoneal signs  EXTERMITY: no cyanosis, clubbing or edema  In: 1615 [I.V.:1515]  Out: 1050 [Urine:700]  Date 22 0000 - 22 2359   Shift 7941-8467 0155-0677 8003-9857 24 Hour Total   INTAKE   P.O.(mL/kg/hr) 0   0   Shift Total(mL/kg) 0(0)   0(0)   OUTPUT   Urine(mL/kg/hr) 700   700   Emesis/NG output(mL/kg) 150(2.5)   150(2.5)   Other(mL/kg) 0(0)   0(0)   Stool(mL/kg) 0(0)   0(0)   Blood(mL/kg) 0(0)   0(0)   Shift Total(mL/kg) 850(14)   850(14)   Weight (kg) 60.8 60.8 60.8 60.8       LABS  Recent Labs     22  0517 22  0625   WBC 6.7 5.3 6.0   HGB 14.3 13.3 12.8   HCT 43.4 39.5 38.2    132 126*    143 142   K 4.0 4.5 3.6    105 104   CO2 25 20 25   BUN 17 18 13   CREATININE 0.62 0.55 0.42*   MG 2.1  --   --    CALCIUM 10.0* 9.1 8.4*      No results for input(s): PTT, INR in the last 72 hours. Invalid input(s): PT  Recent Labs     22   AST 21   ALT 17   BILITOT 0.6   LIPASE 18   LACTA 1.6       RADIOLOGY  CT ABDOMEN PELVIS W IV CONTRAST Additional Contrast? None    Result Date: 2022  EXAMINATION: CT OF THE ABDOMEN AND PELVIS WITH CONTRAST 2022 6:27 pm TECHNIQUE: CT of the abdomen and pelvis was performed with the administration of intravenous contrast. Multiplanar reformatted images are provided for review. Automated exposure control, iterative reconstruction, and/or weight based adjustment of the mA/kV was utilized to reduce the radiation dose to as low as reasonably achievable. COMPARISON: None.  HISTORY: ORDERING SYSTEM PROVIDED HISTORY: Lower ab pain TECHNOLOGIST PROVIDED HISTORY: Additional Contrast?->None Reason for exam:->Lower ab pain Decision Support Exception - unselect if not a suspected or confirmed emergency medical condition->Emergency Medical Condition (MA) What reading provider will be dictating this exam?->CRC FINDINGS: Lower Chest: The lung bases are grossly clear. Organs: The liver is homogeneous in appearance. Spleen is unremarkable. Gallbladder is been surgically removed. There is no intrahepatic or extrahepatic biliary ductal dilatation. The pancreas is heterogeneous. No underlying mass or lesion. Both adrenal glands are within normal limits. Small cyst identified on the left kidney. No stones or distension seen in the renal collecting system. Small cyst on the right kidney. GI/Bowel: Stomach is mildly distended with fluid. No wall thickening. There is some mildly dilated proximal jejunum suggestive of a proximal to mid small bowel obstruction. The transition point and change in caliber is not well seen. The distal small bowel is decompressed. There is stool seen scattered diffusely throughout the colon. Diverticulosis with no evidence of obvious obstruction. No gross free intraperitoneal air. Pelvis: The bladder is unremarkable with no wall thickening. Uterus has been surgically removed. Peritoneum/Retroperitoneum: No abdominal retroperitoneal lymphadenopathy. No free fluid or free air. No abnormal mass or fluid collections identified. Bones/Soft Tissues: The bony structures reveal degenerative changes seen within the spine and pelvis. Small umbilical hernia containing fat only. Dilated stomach and proximal small bowel loops with no wall thickening. Findings suggesting possible small bowel obstruction with change in caliber suggestive of the lower abdomen. No evidence of free intraperitoneal air.      Electronically signed by Andrew Choi MD on 11/8/2022 at 7:25 AM

## 2022-11-08 NOTE — CARE COORDINATION
This LSW met with patient at bedside this afternoon. Patient very pleasant. Patient and I discussed discharge plans- patient will be transferred to [facility]-  pre-cert pending at this time. MAIW/ FLORESITA to follow.   Electronically signed by KWADWO Medina, ANDRES on 11/8/22 at 3:53 PM EST

## 2022-11-08 NOTE — PROGRESS NOTES
Hospitalist Daily Progress Note  Name: Bell Cody  Age: 80 y.o. Gender: female  CodeStatus: Full Code  Allergies: Phenazopyridine    Chief Complaint:Abdominal Pain (Severe, radiates to back, since this am)    Primary Care Provider: Damaris Forde PA-C  InpatientTreatment Team: Treatment Team: Attending Provider: Noemi Mejia DO; Consulting Physician: Jayde Parekh MD; Consulting Physician: Pablo Gonzales DO; Surgeon: Jayde Parekh MD; Utilization Reviewer: Megan Wyatt, RN; Registered Nurse: Azael Hrenandez, RN; Registered Nurse: Anne-Marie Kapoor, RN; : Tierra Anderson, RN; Registered Nurse: Cam Reid, RN  Admission Date: 11/5/2022      Subjective: Patient is seen evaluated bedside. NG tube in place. 100ml brown aspirate via NG today. Pain controlled. with about 400 mL of brown aspirate appreciated. Pain controlled. Afebrile. Vitals stable. Physical Exam  Constitutional:       Appearance: Normal appearance. She is ill-appearing. She is not diaphoretic. HENT:      Head: Normocephalic and atraumatic. Nose: Nose normal.      Mouth/Throat:      Mouth: Mucous membranes are moist.      Pharynx: Oropharynx is clear. Eyes:      Conjunctiva/sclera: Conjunctivae normal.      Pupils: Pupils are equal, round, and reactive to light. Cardiovascular:      Rate and Rhythm: Normal rate. Heart sounds: No murmur heard. No friction rub. No gallop. Pulmonary:      Breath sounds: No wheezing, rhonchi or rales. Abdominal:      General: There is distension. Tenderness: There is abdominal tenderness. There is guarding. Comments: Dressings CDI   Musculoskeletal:         General: Normal range of motion. Neurological:      General: No focal deficit present. Mental Status: She is alert and oriented to person, place, and time. Psychiatric:         Mood and Affect: Mood normal.         Thought Content:  Thought content normal.         Judgment: Judgment normal. Review of Systems  14 point ROS reviewed negative except for as above  Medications:  Reviewed    Infusion Medications:    IV infusion builder 125 mL/hr at 11/08/22 1149    sodium chloride       Scheduled Medications:    potassium chloride  10 mEq IntraVENous Q1H    metoprolol  2.5 mg IntraVENous Q6H    amLODIPine  2.5 mg Oral Daily    [Held by provider] aspirin  81 mg Oral Daily    gabapentin  200 mg Oral Nightly    oxybutynin  5 mg Oral BID    pravastatin  40 mg Oral Daily    heparin (porcine)  5,000 Units SubCUTAneous 3 times per day    pantoprazole (PROTONIX) 40 mg injection  40 mg IntraVENous Q12H    sodium chloride flush  5-40 mL IntraVENous 2 times per day     PRN Meds: HYDROmorphone **OR** HYDROmorphone, ketorolac, dicyclomine, hydrALAZINE, phenol, sodium chloride flush, sodium chloride, ondansetron **OR** ondansetron, polyethylene glycol, acetaminophen **OR** acetaminophen, HYDROmorphone    Labs:   Recent Labs     11/06/22  0517 11/07/22  0625 11/08/22  0632   WBC 5.3 6.0 7.0   HGB 13.3 12.8 12.2   HCT 39.5 38.2 35.8*    126* 135       Recent Labs     11/06/22  0517 11/07/22  0625 11/08/22  0632    142 140   K 4.5 3.6 3.2*    104 102   CO2 20 25 21   BUN 18 13 15   CREATININE 0.55 0.42* 0.38*   CALCIUM 9.1 8.4* 8.2*       Recent Labs     11/05/22  1715   AST 21   ALT 17   BILITOT 0.6   ALKPHOS 67       No results for input(s): INR in the last 72 hours. Recent Labs     11/05/22  1715   TROPONINI <0.010         Urinalysis:   Lab Results   Component Value Date/Time    NITRU Negative 11/05/2022 04:45 PM    WBCUA 10-20 11/05/2022 04:45 PM    BACTERIA Negative 11/05/2022 04:45 PM    RBCUA 0-2 11/05/2022 04:45 PM    BLOODU Negative 11/05/2022 04:45 PM    SPECGRAV 1.064 11/05/2022 04:45 PM    GLUCOSEU Negative 11/05/2022 04:45 PM       Radiology:   Most recent    Chest CT      WITH CONTRAST:No results found for this or any previous visit.        WITHOUT CONTRAST: No results found for this or any previous visit. CXR      2-view: Results for orders placed during the hospital encounter of 03/22/19    XR CHEST STANDARD (2 VW)    Narrative  EXAMINATION: XR CHEST (2 VW). DATE AND TIME:3/24/2019 1:00 PM    CLINICAL HISTORY: Shortness of breath   Eval for new infiltrate    COMPARISONS: 3/22/2019    FINDINGS: Right paratracheal soft tissue density consistent with thyroid lesion. This is unchanged. Heart size normal. Lungs clear. Pleural angles smooth. Bones intact. Impression  NO  ACTIVE LUNG DISEASE. Portable: Results for orders placed during the hospital encounter of 03/22/19    XR CHEST PORTABLE    Narrative  Portable chest radiograph    History: Fever    Technique: AP portable view of the chest obtained. Comparison: None available    Findings:    Atherosclerotic calcification of the thoracic aorta. Heart size is at the upper limits of normal. Coarsening of the pulmonary interstitium. Linear bibasilar pulmonary opacities clinical subsegmental atelectasis. No pneumothorax, pleural effusion, or  focal consolidation. Degenerative changes of the spine. Impression  No acute intrathoracic process. Echo No results found for this or any previous visit. Assessment/Plan:    Active Hospital Problems    Diagnosis Date Noted    Atypical chest pain [R07.89] 11/08/2022     Priority: High    History of hypertrophic cardiomyopathy [Z86.79] 11/08/2022     Priority: High    History of PSVT (paroxysmal supraventricular tachycardia) [Z86.79] 11/08/2022     Priority: High    Abnormal EKG [R94.31] 11/08/2022     Priority: High    Hypertension [I10] 11/08/2022     Priority: High    SBO (small bowel obstruction) ([facility] [address] [K56.609] 11/05/2022     Priority: Medium     Small bowel obstruction: POD 1 pain controlled. Diet advancement per surgeon. DVT ppx per surgeon. NG to LIS.  Continue IV fluids  mL/h    Atypical chest pain with a history of HCM: Cardiology consulted Echo results normal. Metoprolol 2.5 mg every 6 hours ordered. Appreciate cardiac recommendations regarding restratification. Continue optimized electrolytes. Continue statin    Stress ulcer probe access IV Protonix    DVT prophylaxis per surgery. Scd now    Additional work up or/and treatment plan may be added today or then after based on clinical progression. I am managing a portion of pt care. Some medical issues are handled byother specialists. Additional work up and treatment should be done in out pt setting by pt PCP and other out pt providers. In addition to examining and evaluating pt, I spent additional time explaining care, normaland abnormal findings, and treatment plan. All of pt questions were answered. Counseling, diet and education were provided. Case will be discussed with nursing staff when appropriate. Family will be updated if and whenappropriate.       Electronically signed by Nazanin Nascimento DO on 11/8/2022 at 12:20 PM

## 2022-11-08 NOTE — PROGRESS NOTES
Shift assessments completed. A&OX 4.  Medications given per MAR. Call light within reach. No other needs stated by pt at this time.

## 2022-11-08 NOTE — PROGRESS NOTES
yesterday with recommendation for surgical intervention as likelihood of successful nonoperative management was low. After discussion with multiple family members, patient decided to proceed with surgical intervention. Cardiology consult was placed for preoperative risk stratification. At time of my evaluation today, patient is sitting on bedside commode. She complains of ongoing abdominal discomfort. NG tube is in place. She reports an episode of left-sided pinching pain this morning which lasted a few minutes and then resolved. She reports occasional palpitations. She denies shortness of breath, dizziness, lightheadedness, syncope, fever or chills. A.m. EKG reviewed showing sinus rhythm with a sinus arrhythmia with deep T wave inversions in leads V2 through V6 and subtle T wave inversions in inferior lateral leads but EKG overall unchanged when compared to prior EKG from 2019. Echocardiogram ordered this morning and pending. Review of Systems:   Review of Systems   Constitutional:  Negative for fever. HENT:  Negative for congestion. Respiratory:  Negative for chest tightness and shortness of breath. Cardiovascular:  Negative for chest pain, palpitations and leg swelling. Gastrointestinal:  Positive for abdominal pain (with coughing). Negative for nausea and vomiting. Genitourinary:  Negative for difficulty urinating. Musculoskeletal:  Negative for arthralgias. Skin:  Negative for color change. Neurological:  Negative for dizziness, syncope and light-headedness. Psychiatric/Behavioral:  Negative for agitation. Physical Examination:    /67   Pulse 85   Temp 98.4 °F (36.9 °C) (Oral)   Resp 18   Ht 5' 1\" (1.549 m)   Wt 134 lb (60.8 kg)   SpO2 99%   BMI 25.32 kg/m²    Physical Exam  Constitutional:       General: She is not in acute distress. Appearance: She is obese. HENT:      Head: Normocephalic and atraumatic.    Cardiovascular:      Rate and Rhythm: Normal rate. Rhythm irregular. Pulmonary:      Effort: Pulmonary effort is normal. No respiratory distress. Breath sounds: No wheezing, rhonchi or rales. Abdominal:      Palpations: Abdomen is soft. Tenderness: There is no abdominal tenderness. Musculoskeletal:         General: Normal range of motion. Cervical back: Normal range of motion and neck supple. Right lower leg: No edema. Left lower leg: No edema. Skin:     General: Skin is warm and dry. Neurological:      General: No focal deficit present. Mental Status: She is alert and oriented to person, place, and time. Cranial Nerves: No cranial nerve deficit.    Psychiatric:         Mood and Affect: Mood normal.         Behavior: Behavior normal.       LABS:  CBC:   Lab Results   Component Value Date/Time    WBC 7.0 11/08/2022 06:32 AM    RBC 3.69 11/08/2022 06:32 AM    HGB 12.2 11/08/2022 06:32 AM    HCT 35.8 11/08/2022 06:32 AM    MCV 97.1 11/08/2022 06:32 AM    MCH 33.0 11/08/2022 06:32 AM    MCHC 34.0 11/08/2022 06:32 AM    RDW 13.7 11/08/2022 06:32 AM     11/08/2022 06:32 AM     CBC with Differential:   Lab Results   Component Value Date/Time    WBC 7.0 11/08/2022 06:32 AM    RBC 3.69 11/08/2022 06:32 AM    HGB 12.2 11/08/2022 06:32 AM    HCT 35.8 11/08/2022 06:32 AM     11/08/2022 06:32 AM    MCV 97.1 11/08/2022 06:32 AM    MCH 33.0 11/08/2022 06:32 AM    MCHC 34.0 11/08/2022 06:32 AM    RDW 13.7 11/08/2022 06:32 AM    LYMPHOPCT 9.2 11/08/2022 06:32 AM    MONOPCT 5.8 11/08/2022 06:32 AM    BASOPCT 0.0 11/08/2022 06:32 AM    MONOSABS 0.4 11/08/2022 06:32 AM    LYMPHSABS 0.6 11/08/2022 06:32 AM    EOSABS 0.0 11/08/2022 06:32 AM    BASOSABS 0.0 11/08/2022 06:32 AM     CMP:    Lab Results   Component Value Date/Time     11/08/2022 06:32 AM    K 3.2 11/08/2022 06:32 AM    K 3.8 03/24/2019 06:21 AM     11/08/2022 06:32 AM    CO2 21 11/08/2022 06:32 AM    BUN 15 11/08/2022 06:32 AM    CREATININE 0.38 11/08/2022 06:32 AM    GFRAA >60 10/13/2020 01:39 PM    LABGLOM >60.0 11/08/2022 06:32 AM    GLUCOSE 121 11/08/2022 06:32 AM    PROT 7.1 11/05/2022 05:15 PM    LABALBU 4.5 11/05/2022 05:15 PM    CALCIUM 8.2 11/08/2022 06:32 AM    BILITOT 0.6 11/05/2022 05:15 PM    ALKPHOS 67 11/05/2022 05:15 PM    AST 21 11/05/2022 05:15 PM    ALT 17 11/05/2022 05:15 PM     BMP:    Lab Results   Component Value Date/Time     11/08/2022 06:32 AM    K 3.2 11/08/2022 06:32 AM    K 3.8 03/24/2019 06:21 AM     11/08/2022 06:32 AM    CO2 21 11/08/2022 06:32 AM    BUN 15 11/08/2022 06:32 AM    LABALBU 4.5 11/05/2022 05:15 PM    CREATININE 0.38 11/08/2022 06:32 AM    CALCIUM 8.2 11/08/2022 06:32 AM    GFRAA >60 10/13/2020 01:39 PM    LABGLOM >60.0 11/08/2022 06:32 AM    GLUCOSE 121 11/08/2022 06:32 AM     Magnesium:    Lab Results   Component Value Date/Time    MG 2.1 11/05/2022 05:15 PM     Troponin:    Lab Results   Component Value Date/Time    TROPONINI <0.010 11/05/2022 05:15 PM       Radiology:  No results found. Stress test at [facility] on 8/26/22:   PATIENT:   Name: Tenny Finger. Merlin Searles   MRN: [id number]   Age: 80 years   Gender: F     CONCLUSIONS:    1. SPECT Perfusion Study: Normal.    2. There is no scintigraphic evidence for inducible ischemia. 3. No evidence of scarred myocardium. 4. Left ventricle is small. The left ventricle systolic function is   normal.    5. Right ventricle is normal in size. The right ventricle systolic   function is normal.    6. This is a low risk scan. Gated Stress FBP    LVEF % 70       Echocardiogram 11/7/22:  Conclusions   Summary   Normal left ventricle structure and function. Left ventricular ejection fraction is visually estimated at 65%. E/A flow reversal noted. Suggestive of diastolic dysfunction.       Signature   ----------------------------------------------------------------   Electronically signed by Jose Ramon Tucker MD(Interpreting   physician) on 11/07/2022 05:57 PM   ----------------------------------------------------------------        EKG 11/7/22: SR 84, sinus arrhythmia, LVH, ST depression with deep T wave inversion in V2-V6, subtle T wave inversion inferolateral leads, QTc 470ms--unchanged when compared to EKG in 2019    Telemetry 11/8/22: SR 90s, PACs, sinus arrhythmia; 5 beat PSVT at 22:20 yesterday      Assessment:    Active Hospital Problems    Diagnosis Date Noted    SBO (small bowel obstruction) ([facility] [address] [K56.609] 11/05/2022     Priority: Medium     Pre-op cardiac risk stratification   Small bowel obstruction s/p exploratory laparotomy with lysis of adhesions on 11/7/2022  Atypical chest pain episode  Hx apical HCM with mild apical LVH on echo at [facility] 3/22/21--follows with [facility] cardiology, Dr. Ruma Lomeli  Abnormal EKG--unchanged since 2019  S/p stress test 8/26/22 at [facility] which was negative for ischemia  Hx PSVT--brief 5 beat episode noted during admission  Dyslipidemia  HTN      Plan:  Continue current medications-Lopressor 2.5 mg IV every 6 hours for now while n.p.o., Protonix 40 mg IV every 12 hours, heparin 5000 units subcu every 8 hours  Plan to resume home cardiac medications when patient able to take p.o. Monitor on telemetry for any tachycardia or bradycardia arrhythmias  Maintain potassium greater than 4, magnesium greater than 2  GI/DVT prophylaxis  General surgery recommendations regarding small bowel obstruction and possible surgical intervention  Hospitalist recommendations  No new changes from cardiac standpoint.   Recommend continued outpatient follow-up with [facility] cardiologyDr. Ruma upon discharge        Electronically signed by BINTA Cuadra on 11/8/2022 at 11:24 AM

## 2022-11-09 LAB
ANION GAP SERPL CALCULATED.3IONS-SCNC: 15 MEQ/L (ref 9–15)
BASOPHILS ABSOLUTE: 0 K/UL (ref 0–0.2)
BASOPHILS RELATIVE PERCENT: 0.1 %
BUN BLDV-MCNC: 18 MG/DL (ref 8–23)
CALCIUM SERPL-MCNC: 8.7 MG/DL (ref 8.5–9.9)
CHLORIDE BLD-SCNC: 103 MEQ/L (ref 95–107)
CO2: 20 MEQ/L (ref 20–31)
CREAT SERPL-MCNC: 0.39 MG/DL (ref 0.5–0.9)
EOSINOPHILS ABSOLUTE: 0 K/UL (ref 0–0.7)
EOSINOPHILS RELATIVE PERCENT: 0 %
GFR SERPL CREATININE-BSD FRML MDRD: >60 ML/MIN/{1.73_M2}
GLUCOSE BLD-MCNC: 107 MG/DL (ref 70–99)
HCT VFR BLD CALC: 35.2 % (ref 37–47)
HEMOGLOBIN: 11.5 G/DL (ref 12–16)
LYMPHOCYTES ABSOLUTE: 0.5 K/UL (ref 1–4.8)
LYMPHOCYTES RELATIVE PERCENT: 6.7 %
MCH RBC QN AUTO: 32.7 PG (ref 27–31.3)
MCHC RBC AUTO-ENTMCNC: 32.7 % (ref 33–37)
MCV RBC AUTO: 100 FL (ref 79.4–94.8)
MONOCYTES ABSOLUTE: 0.4 K/UL (ref 0.2–0.8)
MONOCYTES RELATIVE PERCENT: 4.7 %
NEUTROPHILS ABSOLUTE: 7.3 K/UL (ref 1.4–6.5)
NEUTROPHILS RELATIVE PERCENT: 88.5 %
PDW BLD-RTO: 14.4 % (ref 11.5–14.5)
PLATELET # BLD: 121 K/UL (ref 130–400)
POTASSIUM SERPL-SCNC: 4.4 MEQ/L (ref 3.4–4.9)
RBC # BLD: 3.52 M/UL (ref 4.2–5.4)
SODIUM BLD-SCNC: 138 MEQ/L (ref 135–144)
WBC # BLD: 8.2 K/UL (ref 4.8–10.8)

## 2022-11-09 PROCEDURE — 97166 OT EVAL MOD COMPLEX 45 MIN: CPT

## 2022-11-09 PROCEDURE — C9113 INJ PANTOPRAZOLE SODIUM, VIA: HCPCS | Performed by: COLON & RECTAL SURGERY

## 2022-11-09 PROCEDURE — 2500000003 HC RX 250 WO HCPCS: Performed by: COLON & RECTAL SURGERY

## 2022-11-09 PROCEDURE — 2580000003 HC RX 258: Performed by: COLON & RECTAL SURGERY

## 2022-11-09 PROCEDURE — 36415 COLL VENOUS BLD VENIPUNCTURE: CPT

## 2022-11-09 PROCEDURE — 1210000000 HC MED SURG R&B

## 2022-11-09 PROCEDURE — 99024 POSTOP FOLLOW-UP VISIT: CPT | Performed by: COLON & RECTAL SURGERY

## 2022-11-09 PROCEDURE — 2580000003 HC RX 258: Performed by: INTERNAL MEDICINE

## 2022-11-09 PROCEDURE — 6370000000 HC RX 637 (ALT 250 FOR IP): Performed by: COLON & RECTAL SURGERY

## 2022-11-09 PROCEDURE — 2700000000 HC OXYGEN THERAPY PER DAY

## 2022-11-09 PROCEDURE — 6360000002 HC RX W HCPCS: Performed by: COLON & RECTAL SURGERY

## 2022-11-09 PROCEDURE — 97162 PT EVAL MOD COMPLEX 30 MIN: CPT

## 2022-11-09 PROCEDURE — A4216 STERILE WATER/SALINE, 10 ML: HCPCS | Performed by: COLON & RECTAL SURGERY

## 2022-11-09 PROCEDURE — 80048 BASIC METABOLIC PNL TOTAL CA: CPT

## 2022-11-09 PROCEDURE — 6360000002 HC RX W HCPCS: Performed by: INTERNAL MEDICINE

## 2022-11-09 PROCEDURE — 85025 COMPLETE CBC W/AUTO DIFF WBC: CPT

## 2022-11-09 RX ADMIN — METOPROLOL TARTRATE 2.5 MG: 5 INJECTION, SOLUTION INTRAVENOUS at 20:49

## 2022-11-09 RX ADMIN — HYDROMORPHONE HYDROCHLORIDE 0.25 MG: 1 INJECTION, SOLUTION INTRAMUSCULAR; INTRAVENOUS; SUBCUTANEOUS at 08:31

## 2022-11-09 RX ADMIN — Medication 10 ML: at 20:53

## 2022-11-09 RX ADMIN — PRAVASTATIN SODIUM 40 MG: 40 TABLET ORAL at 12:35

## 2022-11-09 RX ADMIN — SODIUM CHLORIDE, PRESERVATIVE FREE 40 MG: 5 INJECTION INTRAVENOUS at 06:30

## 2022-11-09 RX ADMIN — HEPARIN SODIUM 5000 UNITS: 5000 INJECTION INTRAVENOUS; SUBCUTANEOUS at 06:30

## 2022-11-09 RX ADMIN — OXYBUTYNIN CHLORIDE 5 MG: 5 TABLET ORAL at 12:35

## 2022-11-09 RX ADMIN — SODIUM CHLORIDE, PRESERVATIVE FREE 40 MG: 5 INJECTION INTRAVENOUS at 17:00

## 2022-11-09 RX ADMIN — POTASSIUM CHLORIDE: 2 INJECTION, SOLUTION, CONCENTRATE INTRAVENOUS at 06:30

## 2022-11-09 RX ADMIN — HYDROMORPHONE HYDROCHLORIDE 0.5 MG: 1 INJECTION, SOLUTION INTRAMUSCULAR; INTRAVENOUS; SUBCUTANEOUS at 02:21

## 2022-11-09 RX ADMIN — METOPROLOL TARTRATE 2.5 MG: 5 INJECTION, SOLUTION INTRAVENOUS at 14:48

## 2022-11-09 RX ADMIN — POTASSIUM CHLORIDE: 2 INJECTION, SOLUTION, CONCENTRATE INTRAVENOUS at 16:59

## 2022-11-09 RX ADMIN — GABAPENTIN 200 MG: 100 CAPSULE ORAL at 20:48

## 2022-11-09 RX ADMIN — HEPARIN SODIUM 5000 UNITS: 5000 INJECTION INTRAVENOUS; SUBCUTANEOUS at 20:48

## 2022-11-09 RX ADMIN — AMLODIPINE BESYLATE 2.5 MG: 2.5 TABLET ORAL at 12:35

## 2022-11-09 RX ADMIN — Medication 10 ML: at 08:32

## 2022-11-09 RX ADMIN — METOPROLOL TARTRATE 2.5 MG: 5 INJECTION, SOLUTION INTRAVENOUS at 02:22

## 2022-11-09 RX ADMIN — METOPROLOL TARTRATE 2.5 MG: 5 INJECTION, SOLUTION INTRAVENOUS at 08:32

## 2022-11-09 RX ADMIN — HEPARIN SODIUM 5000 UNITS: 5000 INJECTION INTRAVENOUS; SUBCUTANEOUS at 14:48

## 2022-11-09 ASSESSMENT — PAIN SCALES - GENERAL
PAINLEVEL_OUTOF10: 6
PAINLEVEL_OUTOF10: 5

## 2022-11-09 ASSESSMENT — PAIN DESCRIPTION - LOCATION: LOCATION: ABDOMEN

## 2022-11-09 NOTE — DISCHARGE INSTR - COC
Continuity of Care Form    Patient Name: Cleo Sarmiento   :  1935  MRN:  [id number]    Admit date:  2022  Discharge date:  22    Code Status Order: Full Code   Advance Directives:     Admitting Physician:  Zhen Dooley DO  PCP: Stephen Meza PA-C    Discharging Nurse: [facility] Unit/Room#: M437/G453-65  Discharging Unit Phone Number: [phone]    Emergency Contact:   Extended Emergency Contact Information  Primary Emergency Contact: Renea Dumont Phone: [phone]  Relation: Child  Preferred language: English   needed?  No  Secondary Emergency Contact: Akash Chinchilla  Address: [address] Phone: [phone]  Work Phone: [phone]  Relation: Spouse    Past Surgical History:  Past Surgical History:   Procedure Laterality Date    ABDOMEN SURGERY      large intestine partial removal    APPENDECTOMY      CHOLECYSTECTOMY      HYSTERECTOMY (CERVIX STATUS UNKNOWN)         Immunization History:   Immunization History   Administered Date(s) Administered    COVID-19, MODERNA BLUE border, Primary or Immunocompromised, (age 12y+), IM, 100 mcg/0.5mL 2021       Active Problems:  Patient Active Problem List   Diagnosis Code    Nausea vomiting and diarrhea R11.2, R19.7    SBO (small bowel obstruction) ([facility] [address] K56.609    Atypical chest pain R07.89    History of hypertrophic cardiomyopathy Z86.79    History of PSVT (paroxysmal supraventricular tachycardia) Z86.79    Abnormal EKG R94.31    Hypertension I10       Isolation/Infection:   Isolation            No Isolation          Patient Infection Status       None to display            Nurse Assessment:  Last Vital Signs: BP (!) 134/52   Pulse 96   Temp 97.3 °F (36.3 °C) (Oral)   Resp 18   Ht 5' 1\" (1.549 m)   Wt 134 lb (60.8 kg)   SpO2 98%   BMI 25.32 kg/m²     Last documented pain score (0-10 scale): Pain Level: 5  Last Weight:   Wt Readings from Last 1 Encounters:   22 134 lb (60.8 kg)     Mental Status:  {IP PT MENTAL STATUS:20030}    IV Access:  - None    Nursing Mobility/ADLs:  Walking   Assisted  Transfer  Independent  Bathing  Assisted  Dressing  Assisted  Toileting  Assisted  Feeding  Independent  Med Admin  Assisted  Med Delivery   whole    Wound Care Documentation and Therapy:  Incision 11/07/22 Abdomen Lower;Medial (Active)   Dressing Status Clean;Dry; Intact 11/09/22 0831   Incision Cleansed Cleansed with saline 11/07/22 1526   Closure Retention sutures; Staples 11/08/22 0804   Margins Approximated 11/07/22 1526   Incision Assessment Dry 11/08/22 0804   Drainage Amount None 11/08/22 0804   Odor None 11/08/22 0804   Juli-incision Assessment Warm; Intact 11/08/22 0804   Number of days: 1        Elimination:  Continence: Bowel: Yes  Bladder: No  Urinary Catheter: None   Colostomy/Ileostomy/Ileal Conduit: No       Date of Last BM: 11/10/22    Intake/Output Summary (Last 24 hours) at 11/9/2022 1114  Last data filed at 11/9/2022 0831  Gross per 24 hour   Intake 3330.65 ml   Output 950 ml   Net 2380.65 ml     I/O last 3 completed shifts: In: 3250.7 [I.V.:2666.2; IV Piggyback:584.5]  Out: 6124 [Urine:1400; Emesis/NG output:350]    Safety Concerns: At Risk for Falls    Impairments/Disabilities:      Hearing    Nutrition Therapy:  Current Nutrition Therapy:   - Oral Diet:  Full Liquid and advance as tolerated    Routes of Feeding: Oral  Liquids: Thin Liquids  Daily Fluid Restriction: no  Last Modified Barium Swallow with Video (Video Swallowing Test): not done    Treatments at the Time of Hospital Discharge:   Respiratory Treatments:   Oxygen Therapy:  is not on home oxygen therapy.   Ventilator:    - No ventilator support    Rehab Therapies: Physical Therapy, Occupational Therapy, and Speech/Language Therapy  Weight Bearing Status/Restrictions: No weight bearing restrictions  Other Medical Equipment (for information only, NOT a DME order):  wheelchair and walker  Other Treatments: Patient's personal belongings (please select all that are sent with patient):  Glasses    RN SIGNATURE:  Electronically signed by Tomasa Ariza RN on 11/11/22 at 2:44 PM EST    CASE MANAGEMENT/SOCIAL WORK SECTION    Inpatient Status Date: ***    Readmission Risk Assessment Score:  Readmission Risk              Risk of Unplanned Readmission:  10           Discharging to Facility/ Agency   Name: [facility] Jeter   Address:  Phone:  [phone]  Fax:    Dialysis Facility (if applicable)   Name:  Address:  Dialysis Schedule:  Phone:  Fax:    / signature: Electronically signed by KWADWO Echols, MAIW on 11/11/22 at 10:56 AM EST    PHYSICIAN SECTION    Prognosis: {Prognosis:4431891025}    Condition at Discharge: 8 Myrna Brennan Patient Condition:660808707}    Rehab Potential (if transferring to Rehab): {Prognosis:5211067467}    Recommended Labs or Other Treatments After Discharge: ***    Physician Certification: I certify the above information and transfer of Trixie Rausch  is necessary for the continuing treatment of the diagnosis listed and that she requires {Admit to Appropriate Level of Care:12597} for {GREATER/LESS:825666402} 30 days.      Update Admission H&P: {CHP DME Changes in VYJKL:488895864}    PHYSICIAN SIGNATURE:  Electronically signed by Felipe Walker DO on 11/11/22 at 10:09 AM EST

## 2022-11-09 NOTE — PROGRESS NOTES
0830: Assessment completed. VSS- Pt is on 3 L NC, SPO2 98%. Pt is NPO with exceptions with ice chips and popsicles. NG is in L nare at 50 to LIWS. Whitlock catheter in place draining jarek urine. ABD dressing is CDI with abd binder in place. Pt up to chair at this time with x1 assist. Pt c/o of 5/10 pain--medicated with PRN Dilaudid per MAR. Pt denies further needs. Chair alarm on. Comfort measures provided     1245: NG removed by Dr. Ivonne Pan. Patient on clear liquid diet at this time. Medications given per MAR. Denies further needs. Spouse at bedside. 1825: Patient tolerating clear liquids at this time. Pt is 93% SPO2 on RA. Pt resting in bed with SCDs in place. Denies further needs.      Electronically signed by Blaire Pérez RN on 11/9/22 at 6:28 PM EST

## 2022-11-09 NOTE — PROGRESS NOTES
[facility] OCCUPATIONAL THERAPY EVALUATION - ACUTE     NAME: Ashley Ayoub  : 1935 (80 y.o.)  MRN: [id number]  CODE STATUS: Full Code  Room: R748/A847-62    Date of Service: 2022    Patient Diagnosis(es): Small bowel obstruction ([address] [X60.789]  SBO (small bowel obstruction) ([address] [J43.583]   Patient Active Problem List    Diagnosis Date Noted    Atypical chest pain 2022    History of hypertrophic cardiomyopathy 2022    History of PSVT (paroxysmal supraventricular tachycardia) 2022    Abnormal EKG 2022    Hypertension 2022    SBO (small bowel obstruction) ([address] 2022    Nausea vomiting and diarrhea 2019        Past Medical History:   Diagnosis Date    Cancer ([address]     bladder    Hyperlipidemia     Hypertension      Past Surgical History:   Procedure Laterality Date    ABDOMEN SURGERY      large intestine partial removal    APPENDECTOMY      CHOLECYSTECTOMY      HYSTERECTOMY (CERVIX STATUS UNKNOWN)          Restrictions  Restrictions/Precautions: Fall Risk     Safety Devices: Safety Devices  Type of Devices: All fall risk precautions in place; Left in bed;Call light within reach; Bed alarm in place     Patient's date of birth confirmed: Yes    General:  Chart Reviewed: Yes  Patient assessed for rehabilitation services?: Yes    Subjective  Subjective: \"I feel okay. \"       Pain at start of treatment: Yes: 5/10    Pain at end of treatment: Yes: 5/10    Location: Abdomen  Nursing notified: Declined  RN:   Intervention: Repositioned    Prior Level of Function:  Social/Functional History  Lives With: Spouse, Son  Type of Home: House  Home Layout: Two level, Able to Live on Main level with bedroom/bathroom, Laundry in basement (Pt does laundry - 11 steps down with HR)  Home Access: Stairs to enter with rails  Entrance Stairs - Number of Steps: 3  Entrance Stairs - Rails: Both  Bathroom Shower/Tub: Tub/Shower unit  Bathroom Equipment: Grab bars in shower, Shower chair, Hand-held shower  Home Equipment: Rollator  ADL Assistance: Independent  Homemaking Assistance: Independent  Ambulation Assistance: Independent (no AD)  Transfer Assistance: Independent  Active : No  Patient's  Info: spouse  IADL Comments: Pt's dtr takes her to grocery store, and was able to walk grocery store distance prior to admission    OBJECTIVE:     Orientation Status:  Orientation  Overall Orientation Status: Within Functional Limits    Observation:  Observation/Palpation  Posture: Good  Observation: No acute distress noted. NG tube in place and connected to suction. 3L o2 via NC. Cognition Status:  Cognition  Overall Cognitive Status: WFL    Perception Status:  Perception  Overall Perceptual Status: WFL    Vision and Hearing Status:  Vision  Vision Exceptions: Wears glasses at all times  Hearing  Hearing Exceptions: Hard of hearing/hearing concerns;Bilateral hearing aid   Vision - Basic Assessment  Prior Vision: Wears glasses all the time  Visual History: No significant visual history  Patient Visual Report: No visual complaint reported. Visual Field Cut: No  Oculo Motor Control: WNL    GROSS ASSESSMENT AROM/PROM:  AROM: Generally decreased, functional       ROM:   LUE AROM (degrees)  LUE AROM : WFL  Left Hand AROM (degrees)  Left Hand AROM: WFL  RUE AROM (degrees)  RUE AROM : WFL  Right Hand AROM (degrees)  Right Hand AROM: WFL    UE STRENGTH:  Strength: Generally decreased, functional    UE COORDINATION:  Coordination: Generally decreased, functional    UE TONE:  Tone: Normal    UE SENSATION:  Sensation: Intact    Hand Dominance:  Hand Dominance  Hand Dominance: Right    ADL Status:  ADL  Feeding: NPO  Feeding Skilled Clinical Factors: + Hand to mouth  Grooming: Setup  UE Bathing: Supervision  LE Bathing: Supervision  UE Dressing: Supervision  LE Dressing: Supervision  Toileting: Supervision  Additional Comments: Simulated ADLs as above. Limited d/t NG tube and fatigue  Toilet Transfers  Toilet Transfer: Unable to assess  Toilet Transfers Comments: Anticipate CGA    Functional Mobility:    Transfers  Sit to stand: Supervision  Stand to sit: Supervision    Patient ambulated to/from sink with No device at Supervision level. Completed x 2 and ambulated around bed with G safety awareness. Occasionally placed hand on wall or bed for balance. Able to avoid obstacles safely. Bed Mobility  Bed mobility  Sit to Supine: Minimal assistance  Scooting: Contact guard assistance  Bed Mobility Comments: Step by step cues for follow through. Limited by pain. Assist to lift LEs into bed partially. Slow to complete. Seated and Standing Balance:  Balance  Sitting: Intact  Standing: Impaired  Standing - Static: Good  Standing - Dynamic: Fair    Functional Endurance:  Activity Tolerance  Activity Tolerance: Patient Tolerated treatment well    D/C Recommendations:  OT D/C RECOMMENDATIONS  REQUIRES OT FOLLOW-UP: Yes    Equipment Recommendations:  OT Equipment Recommendations  Other: Continue to assess    OT Education:   Patient Education  Education Given To: Patient  Education Provided: Role of Therapy;Plan of Care  Education Method: Verbal  Barriers to Learning: None  Education Outcome: Verbalized understanding    OT Follow Up:   OT D/C RECOMMENDATIONS  REQUIRES OT FOLLOW-UP: Yes       Assessment/Discharge Disposition:  Assessment: Pti s an 80year old woman from home with spouse who [facility] with the above deficits which impact her ability to perform ADLs and IADLs. Pt. limited d/t fatigue and weakness. Pt would benefit from continued OT to maximize independence and safety with ADL tasks.   Performance deficits / Impairments: Decreased functional mobility , Decreased ADL status, Decreased strength, Decreased sensation, Decreased endurance, Decreased balance, Decreased high-level IADLs  Prognosis: Good  Discharge Recommendations: Continue to assess pending progress  Decision Making: Medium Complexity  History: Pt's medical history is moderately complex  Exam: Pt. has 7 performance deficits  Assistance / Modification: Pt. requires SBA    AMPAC (Six Click) Self care Score   How much help for putting on and taking off regular lower body clothing?: A Little  How much help for Bathing?: A Little  How much help for Toileting?: A Little  How much help for putting on and taking off regular upper body clothing?: A Little  How much help for taking care of personal grooming?: A Little  How much help for eating meals?: A Little  AM-[facility] Inpatient Daily Activity Raw Score: 18  AM-PAC Inpatient ADL T-Scale Score : 38.66  ADL Inpatient CMS 0-100% Score: 46.65    Therapy key for assistance levels -   Independent/Mod I = Pt. is able to perform task with no assistance but may require a device   Stand by assistance = Pt. does not perform task at an independent level but does not need physical assistance, requires verbal cues  Minimal, Moderate, Maximal Assistance = Pt. requires physical assistance (25%, 50%, 75% assist from helper) for task but is able to actively participate in task   Dependent = Pt. requires total assistance with task and is not able to actively participate with task completion     Plan:  Occupational Therapy Plan  Times Per Week: 1-4x  Therapy Duration:  (Length of acute stay)  Current Treatment Recommendations: Strengthening, Balance training, Functional mobility training, Endurance training, Pain management, Safety education & training, Patient/Caregiver education & training, Self-Care / ADL, Home management training, Equipment evaluation, education, & procurement    Goals:   Patient will:    - Improve functional endurance to tolerate/complete 30 mins of ADL's  - Be Mod I in UB ADLs   - Be Mod I in LB ADLs  - Be Mod I in ADL transfers without LOB  - Be Mod I in toileting tasks  - Improve B UE strength and endurance to 4/5 in order to participate in self-care activities as projected.   - Access appropriate D/C site with as few architectural barriers as possible. - Sequence self-care tasks with no verbal cues for safety    Patient Goal: Patient goals : \"I want to get home\"     Discussed and agreed upon: Yes Comments:       Therapy Time:   Individual   Time In 1014   Time Out 1029   Minutes 15          Eval: 15 minutes     Electronically signed by:     SHEA Lei,   11/9/2022, 11:45 AM

## 2022-11-09 NOTE — PROGRESS NOTES
Hospitalist Daily Progress Note  Name: Drew Given  Age: 80 y.o. Gender: female  CodeStatus: Full Code  Allergies: Phenazopyridine    Chief Complaint:Abdominal Pain (Severe, radiates to back, since this am)    Primary Care Provider: Remington Lee PA-C  InpatientTreatment Team: Treatment Team: Attending Provider: Emanuel Dandy, DO; Consulting Physician: Eunice Garcia MD; Consulting Physician: Ginny Wilson DO; Surgeon: Eunice Garcia MD; Utilization Reviewer: Lexis Fragoso, FENG; : Arnel Pritchard RN; Registered Nurse: Valencia Catherine, RN; Registered Nurse: Moise Parada RN  Admission Date: 11/5/2022      Subjective: Patient is seen evaluated bedside. NG tube in place. Pain controlled. Vital stable. Out of bed to chair working well physical therapy no acute concerns  at the bedside and updated    Physical Exam  Constitutional:       Appearance: Normal appearance. She is ill-appearing. She is not diaphoretic. HENT:      Head: Normocephalic and atraumatic. Nose: Nose normal.      Mouth/Throat:      Mouth: Mucous membranes are moist.      Pharynx: Oropharynx is clear. Eyes:      Conjunctiva/sclera: Conjunctivae normal.      Pupils: Pupils are equal, round, and reactive to light. Cardiovascular:      Rate and Rhythm: Normal rate. Heart sounds: No murmur heard. No friction rub. No gallop. Pulmonary:      Breath sounds: No wheezing, rhonchi or rales. Abdominal:      General: There is distension. Tenderness: There is abdominal tenderness. There is guarding. Comments: Dressings CDI   Musculoskeletal:         General: Normal range of motion. Neurological:      General: No focal deficit present. Mental Status: She is alert and oriented to person, place, and time. Psychiatric:         Mood and Affect: Mood normal.         Thought Content:  Thought content normal.         Judgment: Judgment normal.       Review of Systems  14 point ROS reviewed negative except for as above  Medications:  Reviewed    Infusion Medications:    IV infusion builder 125 mL/hr at 11/09/22 0630    sodium chloride       Scheduled Medications:    metoprolol  2.5 mg IntraVENous Q6H    amLODIPine  2.5 mg Oral Daily    [Held by provider] aspirin  81 mg Oral Daily    gabapentin  200 mg Oral Nightly    oxybutynin  5 mg Oral BID    pravastatin  40 mg Oral Daily    heparin (porcine)  5,000 Units SubCUTAneous 3 times per day    pantoprazole (PROTONIX) 40 mg injection  40 mg IntraVENous Q12H    sodium chloride flush  5-40 mL IntraVENous 2 times per day     PRN Meds: HYDROmorphone **OR** HYDROmorphone, ketorolac, dicyclomine, hydrALAZINE, phenol, sodium chloride flush, sodium chloride, ondansetron **OR** ondansetron, polyethylene glycol, acetaminophen **OR** acetaminophen, HYDROmorphone    Labs:   Recent Labs     11/07/22  0625 11/08/22  0632 11/09/22  0940   WBC 6.0 7.0 8.2   HGB 12.8 12.2 11.5*   HCT 38.2 35.8* 35.2*   * 135 121*       Recent Labs     11/07/22  0625 11/08/22  0632 11/09/22  0940    140 138   K 3.6 3.2* 4.4    102 103   CO2 25 21 20   BUN 13 15 18   CREATININE 0.42* 0.38* 0.39*   CALCIUM 8.4* 8.2* 8.7       No results for input(s): AST, ALT, BILIDIR, BILITOT, ALKPHOS in the last 72 hours. No results for input(s): INR in the last 72 hours. No results for input(s): Catalina Smack in the last 72 hours. Urinalysis:   Lab Results   Component Value Date/Time    NITRU Negative 11/05/2022 04:45 PM    WBCUA 10-20 11/05/2022 04:45 PM    BACTERIA Negative 11/05/2022 04:45 PM    RBCUA 0-2 11/05/2022 04:45 PM    BLOODU Negative 11/05/2022 04:45 PM    SPECGRAV 1.064 11/05/2022 04:45 PM    GLUCOSEU Negative 11/05/2022 04:45 PM       Radiology:   Most recent    Chest CT      WITH CONTRAST:No results found for this or any previous visit. WITHOUT CONTRAST: No results found for this or any previous visit.       CXR      2-view: Results for orders placed during the hospital encounter of 03/22/19    XR CHEST STANDARD (2 VW)    Narrative  EXAMINATION: XR CHEST (2 VW). DATE AND TIME:3/24/2019 1:00 PM    CLINICAL HISTORY: Shortness of breath   Eval for new infiltrate    COMPARISONS: 3/22/2019    FINDINGS: Right paratracheal soft tissue density consistent with thyroid lesion. This is unchanged. Heart size normal. Lungs clear. Pleural angles smooth. Bones intact. Impression  NO  ACTIVE LUNG DISEASE. Portable: Results for orders placed during the hospital encounter of 03/22/19    XR CHEST PORTABLE    Narrative  Portable chest radiograph    History: Fever    Technique: AP portable view of the chest obtained. Comparison: None available    Findings:    Atherosclerotic calcification of the thoracic aorta. Heart size is at the upper limits of normal. Coarsening of the pulmonary interstitium. Linear bibasilar pulmonary opacities clinical subsegmental atelectasis. No pneumothorax, pleural effusion, or  focal consolidation. Degenerative changes of the spine. Impression  No acute intrathoracic process. Echo No results found for this or any previous visit. Assessment/Plan:    Active Hospital Problems    Diagnosis Date Noted    Atypical chest pain [R07.89] 11/08/2022     Priority: High    History of hypertrophic cardiomyopathy [Z86.79] 11/08/2022     Priority: High    History of PSVT (paroxysmal supraventricular tachycardia) [Z86.79] 11/08/2022     Priority: High    Abnormal EKG [R94.31] 11/08/2022     Priority: High    Hypertension [I10] 11/08/2022     Priority: High    SBO (small bowel obstruction) ([facility] [K56.609] 11/05/2022     Priority: Medium     Small bowel obstruction: POD 2 pain controlled. Diet advancement per surgeon. DVT ppx per surgeon. NG to LIS. Continue IV fluids  mL/h    Atypical chest pain with a history of HCM: Cardiology consulted Echo results normal.  Metoprolol 2.5 mg every 6 hours ordered.   Appreciate cardiac recommendations regarding restratification. Continue optimized electrolytes. Continue statin    Stress ulcer probe access IV Protonix    Will dc oxybutynin, amlodipine, bentyl in order to reduce risk of subsequent SBO    DVT prophylaxis per surgery. Scd now    Additional work up or/and treatment plan may be added today or then after based on clinical progression. I am managing a portion of pt care. Some medical issues are handled byother specialists. Additional work up and treatment should be done in out pt setting by pt PCP and other out pt providers. In addition to examining and evaluating pt, I spent additional time explaining care, normaland abnormal findings, and treatment plan. All of pt questions were answered. Counseling, diet and education were provided. Case will be discussed with nursing staff when appropriate. Family will be updated if and whenappropriate.       Electronically signed by Eliz Forde DO on 11/9/2022 at 3:18 PM

## 2022-11-09 NOTE — CARE COORDINATION
This LSW was notified by Malena Lerner at [facility]- she indicated that patient will need to have PT/OT evals for authorization. PT/OT orders have been requested. LSW/CM to follow.   Electronically signed by KWADWO Parada, LSW on 11/9/22 at 8:40 AM EST

## 2022-11-09 NOTE — PLAN OF CARE
See OT evaluation for all goals and OT POC.  Electronically signed by IVÁN Jones/L on 11/9/2022 at 11:46 AM

## 2022-11-09 NOTE — PROGRESS NOTES
Pt Name: Genie Record Number: [id number]  Date of Birth 1935   Admit date 2022  5:06 PM  Today's Date: 2022     ASSESSMENT  1. Hospital day # 4  2 postop day #2 laparotomy with lysis of adhesions  3. Minimal NG tube output  4. Denies pain    PLAN  1.  DC NG tube  2. Clear liquids  3. Physical therapy    SUBJECTIVE  Chief complaint: Follow-up laparotomy  Afebrile, vital signs are stable. She denies any nausea or vomiting, some flatus She is tolerating a ADULT DIET; Clear Liquid. Her pain is well controlled on current medications. She has been ambulating in the room with assistance      has a past medical history of Cancer ([address] Hyperlipidemia, and Hypertension. CURRENT MEDS  Scheduled Meds:   metoprolol  2.5 mg IntraVENous Q6H    amLODIPine  2.5 mg Oral Daily    [Held by provider] aspirin  81 mg Oral Daily    gabapentin  200 mg Oral Nightly    oxybutynin  5 mg Oral BID    pravastatin  40 mg Oral Daily    heparin (porcine)  5,000 Units SubCUTAneous 3 times per day    pantoprazole (PROTONIX) 40 mg injection  40 mg IntraVENous Q12H    sodium chloride flush  5-40 mL IntraVENous 2 times per day     Continuous Infusions:   IV infusion builder 125 mL/hr at 22 0630    sodium chloride       PRN Meds:. HYDROmorphone **OR** HYDROmorphone, ketorolac, dicyclomine, hydrALAZINE, phenol, sodium chloride flush, sodium chloride, ondansetron **OR** ondansetron, polyethylene glycol, acetaminophen **OR** acetaminophen, HYDROmorphone    OBJECTIVE  CURRENT VITALS:  height is 5' 1\" (1.549 m) and weight is 134 lb (60.8 kg). Her oral temperature is 97.3 °F (36.3 °C). Her blood pressure is 134/52 (abnormal) and her pulse is 96. Her respiration is 18 and oxygen saturation is 98%.    Temperature Range (24h):Temp: 97.3 °F (36.3 °C) Temp  Av.4 °F (36.9 °C)  Min: 97.3 °F (36.3 °C)  Max: 99 °F (37.2 °C)  BP Range (39Y): Systolic (63QZC), DWH:080 , Min:109 , AAE:239     Diastolic (19ALH), DLD:23, Min:42, Max:61    Pulse Range (24h): Pulse  Av.4  Min: 96  Max: 103  Respiration Range (24h): Resp  Av.2  Min: 16  Max: 18    GENERAL: alert, no distress  LUNGS: clear to ausculation, without wheezes, rales or rhonci  HEART: normal rate and regular rhythm  ABDOMEN: non-distended, bowel sounds present in all 4 quadrants, and no guarding or peritoneal signs  EXTERMITY: no cyanosis, clubbing or edema  In: 3330.7 [P.O.:80; I.V.:2666.2]  Out: 1050 [Urine:800]  Date 22 0000 - 22 235   Shift 3210-2196 5606-5608 8686-5945 24 Hour Total   INTAKE   P.O.(mL/kg/hr)  80  80   Shift Total(mL/kg)  80(1.3)  80(1.3)   OUTPUT   Urine(mL/kg/hr) 350(0.7) 100  450   Emesis/NG output(mL/kg) 100(1.6) 50(0.8)  150(2.5)   Shift Total(mL/kg) 450(7.4) 150(2.5)  600(9.9)   Weight (kg) 60.8 60.8 60.8 60.8       LABS  Recent Labs     22  0625 22  0632 22  0940   WBC 6.0 7.0 8.2   HGB 12.8 12.2 11.5*   HCT 38.2 35.8* 35.2*   * 135 121*    140 138   K 3.6 3.2* 4.4    102 103   CO2 25 21 20   BUN 13 15 18   CREATININE 0.42* 0.38* 0.39*   CALCIUM 8.4* 8.2* 8.7      No results for input(s): PTT, INR in the last 72 hours. Invalid input(s): PT  No results for input(s): AST, ALT, BILITOT, BILIDIR, AMYLASE, LIPASE, LDH, LACTA in the last 72 hours. RADIOLOGY  CT ABDOMEN PELVIS W IV CONTRAST Additional Contrast? None    Result Date: 2022  EXAMINATION: CT OF THE ABDOMEN AND PELVIS WITH CONTRAST 2022 6:27 pm TECHNIQUE: CT of the abdomen and pelvis was performed with the administration of intravenous contrast. Multiplanar reformatted images are provided for review. Automated exposure control, iterative reconstruction, and/or weight based adjustment of the mA/kV was utilized to reduce the radiation dose to as low as reasonably achievable. COMPARISON: None.  HISTORY: ORDERING SYSTEM PROVIDED HISTORY: Lower ab pain TECHNOLOGIST PROVIDED HISTORY: Additional Contrast?->None Reason for exam:->Lower ab pain Decision Support Exception - unselect if not a suspected or confirmed emergency medical condition->Emergency Medical Condition (MA) What reading provider will be dictating this exam?->CRC FINDINGS: Lower Chest: The lung bases are grossly clear. Organs: The liver is homogeneous in appearance. Spleen is unremarkable. Gallbladder is been surgically removed. There is no intrahepatic or extrahepatic biliary ductal dilatation. The pancreas is heterogeneous. No underlying mass or lesion. Both adrenal glands are within normal limits. Small cyst identified on the left kidney. No stones or distension seen in the renal collecting system. Small cyst on the right kidney. GI/Bowel: Stomach is mildly distended with fluid. No wall thickening. There is some mildly dilated proximal jejunum suggestive of a proximal to mid small bowel obstruction. The transition point and change in caliber is not well seen. The distal small bowel is decompressed. There is stool seen scattered diffusely throughout the colon. Diverticulosis with no evidence of obvious obstruction. No gross free intraperitoneal air. Pelvis: The bladder is unremarkable with no wall thickening. Uterus has been surgically removed. Peritoneum/Retroperitoneum: No abdominal retroperitoneal lymphadenopathy. No free fluid or free air. No abnormal mass or fluid collections identified. Bones/Soft Tissues: The bony structures reveal degenerative changes seen within the spine and pelvis. Small umbilical hernia containing fat only. Dilated stomach and proximal small bowel loops with no wall thickening. Findings suggesting possible small bowel obstruction with change in caliber suggestive of the lower abdomen. No evidence of free intraperitoneal air.      Electronically signed by Mary Kay Mason MD on 11/9/2022 at 12:37 PM

## 2022-11-09 NOTE — PROGRESS NOTES
Physical Therapy Med Surg Initial Assessment  Facility/Department: [facility]  Room: [place] [id number]       NAME: Tobias Calix  : 1935 (80 y.o.)  MRN: [id number]  CODE STATUS: Full Code    Date of Service: 2022    Patient Diagnosis(es): Small bowel obstruction ([facility] [V12.409]  SBO (small bowel obstruction) [facility]) [J31.070]   Chief Complaint   Patient presents with    Abdominal Pain     Severe, radiates to back, since this am     Patient Active Problem List    Diagnosis Date Noted    Atypical chest pain 2022    History of hypertrophic cardiomyopathy 2022    History of PSVT (paroxysmal supraventricular tachycardia) 2022    Abnormal EKG 2022    Hypertension 2022    SBO (small bowel obstruction) ([facility] 2022    Nausea vomiting and diarrhea 2019        Past Medical History:   Diagnosis Date    Cancer ([facility]     bladder    Hyperlipidemia     Hypertension      Past Surgical History:   Procedure Laterality Date    ABDOMEN SURGERY      large intestine partial removal    APPENDECTOMY      CHOLECYSTECTOMY      HYSTERECTOMY (CERVIX STATUS UNKNOWN)         Chart Reviewed: Yes  Patient assessed for rehabilitation services?: Yes  Family / Caregiver Present: No  Diagnosis: SBO (small bowel obstruction) ([facility]  General Comment  Comments: No acute distress. Pt pleasant and motivated. Restrictions:  Restrictions/Precautions: Fall Risk     SUBJECTIVE:   Subjective: \"I'm alright. \"    Pain  Pain: \"No pain. Just dizzy. \"    Prior Level of Function:  Social/Functional History  Lives With: Spouse, Son  Type of Home: House  Home Layout: Two level, Able to Live on Main level with bedroom/bathroom, Laundry in basement (Pt does laundry - 11 steps down with HR)  Home Access: Stairs to enter with rails  Entrance Stairs - Number of Steps: 3  Entrance Stairs - Rails: Both  Bathroom Shower/Tub: Tub/Shower unit  Bathroom Equipment: Grab bars in shower, Shower chair, Hand-held shower  Home Equipment: Rollator  ADL Assistance: Independent  Homemaking Assistance: Independent  Ambulation Assistance: Independent (no AD)  Transfer Assistance: Independent  Active : No  Patient's  Info: spouse  IADL Comments: Pt's dtr takes her to grocery store, and was able to walk grocery store distance prior to admission    OBJECTIVE:   Vision  Vision Exceptions: Wears glasses at all times  Hearing Exceptions: Hard of hearing/hearing concerns;Bilateral hearing aid    Cognition:  Overall Orientation Status: Within Functional Limits  Follows Commands: Within Functional Limits  Overall Cognitive Status: WFL    Observation/Palpation  Observation: No acute distress noted. NG tube in place and connected to suction. 3L o2 via NC.    ROM:  RLE PROM: WFL  LLE PROM: WFL    Strength:  Strength RLE  Strength RLE: WFL  Strength LLE  Strength LLE: WFL    Neuro:  Balance  Sitting - Static: Good  Sitting - Dynamic: Good  Standing - Static: Good;Fair  Standing - Dynamic: Fair;Good                      Tone: Normal    Sensation: Intact    Bed mobility  Sit to Supine: Minimal assistance  Scooting: Contact guard assistance  Bed Mobility Comments: Step by step cues for follow through. Limited by pain. Assist to lift LEs into bed partially. Slow to complete. Transfers  Sit to Stand: Stand by assistance;Supervision  Stand to Sit: Stand by assistance;Supervision  Bed to Chair: Stand by assistance;Supervision  Comment: Slow to complete.  Assist for line management    Ambulation  Surface: Level tile  Device: No Device  Other Apparatus: O2  Assistance: Supervision;Stand by assistance  Quality of Gait: Slow pacing with shortened stride length  Distance: 10ft with turn; 10ft around bed                   Activity Tolerance  Activity Tolerance: Patient tolerated treatment well    Patient Education  Education Given To: Patient  Education Provided: Role of Therapy;Plan of Care  Education Method: Verbal  Education Outcome: Verbalized understanding       ASSESSMENT:   Body Structures, Functions, Activity Limitations Requiring Skilled Therapeutic Intervention: Decreased functional mobility ; Decreased ADL status; Decreased ROM; Decreased strength;Decreased endurance;Decreased balance;Decreased coordination;Decreased safe awareness; Increased pain  Decision Making: Medium Complexity  History: High  Exam: Med  Clinical Presentation: Med    Therapy Prognosis: Good  Barriers to Learning: none         DISCHARGE RECOMMENDATIONS:  Discharge Recommendations: Continue to assess pending progress, Patient would benefit from continued therapy after discharge    Assessment: Continued PT indicated to progress mobility and facilitate DC at highest level of indep and safety. No concerns at this time. Will follow. Requires PT Follow-Up: Yes      PLAN OF CARE:  Physcial Therapy Plan  General Plan: 1 time a day 3-6 times a week  Current Treatment Recommendations: Strengthening, Balance training, Functional mobility training, Transfer training, ADL/Self-care training, Neuromuscular re-education, Endurance training, Gait training, Stair training, Patient/Caregiver education & training, Equipment evaluation, education, & procurement, Safety education & training, Home exercise program, Manual Therapy - Soft Tissue Mobilization    Safety Devices  Type of Devices:  All fall risk precautions in place    Goals:  Long Term Goals  Long Term Goal 1: Pt to complete bed mobility with indep  Long Term Goal 2: Pt to complete transfers with indep  Long Term Goal 3: Pt to ambulate 50-150ft with LRD and indep  Long Term Goal 4: Pt to manage 3 steps with HR and indep  Long Term Goal 5: Pt to complete HEP with indep    AMPAC (6 CLICK) BASIC MOBILITY  AM-PAC Inpatient Mobility Raw Score : 18     Therapy Time:   Individual   Time In 1014   Time Out 1029   Minutes [address] [place] [address], 11/09/22 at 11:49 AM         Definitions for assistance levels  Independent = pt does not require any physical supervision or assistance from another person for activity completion. Device may be needed.   Stand by assistance = pt requires verbal cues or instructions from another person, close to but not touching, to perform the activity  Minimal assistance= pt performs 75% or more of the activity; assistance is required to complete the activity  Moderate assistance= pt performs 50% of the activity; assistance is required to complete the activity  Maximal assistance = pt performs 25% of the activity; assistance is required to complete the activity  Dependent = pt requires total physical assistance to accomplish the task

## 2022-11-10 LAB
ANION GAP SERPL CALCULATED.3IONS-SCNC: 8 MEQ/L (ref 9–15)
BASOPHILS ABSOLUTE: 0 K/UL (ref 0–0.2)
BASOPHILS RELATIVE PERCENT: 0.1 %
BUN BLDV-MCNC: 17 MG/DL (ref 8–23)
CALCIUM SERPL-MCNC: 8.6 MG/DL (ref 8.5–9.9)
CHLORIDE BLD-SCNC: 104 MEQ/L (ref 95–107)
CO2: 25 MEQ/L (ref 20–31)
CREAT SERPL-MCNC: 0.4 MG/DL (ref 0.5–0.9)
EOSINOPHILS ABSOLUTE: 0 K/UL (ref 0–0.7)
EOSINOPHILS RELATIVE PERCENT: 0.2 %
GFR SERPL CREATININE-BSD FRML MDRD: >60 ML/MIN/{1.73_M2}
GLUCOSE BLD-MCNC: 97 MG/DL (ref 70–99)
HCT VFR BLD CALC: 29.4 % (ref 37–47)
HEMOGLOBIN: 9.7 G/DL (ref 12–16)
LYMPHOCYTES ABSOLUTE: 1.2 K/UL (ref 1–4.8)
LYMPHOCYTES RELATIVE PERCENT: 18 %
MCH RBC QN AUTO: 32.2 PG (ref 27–31.3)
MCHC RBC AUTO-ENTMCNC: 33 % (ref 33–37)
MCV RBC AUTO: 97.6 FL (ref 79.4–94.8)
MONOCYTES ABSOLUTE: 0.4 K/UL (ref 0.2–0.8)
MONOCYTES RELATIVE PERCENT: 6.6 %
NEUTROPHILS ABSOLUTE: 4.9 K/UL (ref 1.4–6.5)
NEUTROPHILS RELATIVE PERCENT: 75.1 %
PDW BLD-RTO: 13.9 % (ref 11.5–14.5)
PLATELET # BLD: 128 K/UL (ref 130–400)
POTASSIUM SERPL-SCNC: 4.8 MEQ/L (ref 3.4–4.9)
RBC # BLD: 3.01 M/UL (ref 4.2–5.4)
SODIUM BLD-SCNC: 137 MEQ/L (ref 135–144)
WBC # BLD: 6.6 K/UL (ref 4.8–10.8)

## 2022-11-10 PROCEDURE — 2500000003 HC RX 250 WO HCPCS: Performed by: COLON & RECTAL SURGERY

## 2022-11-10 PROCEDURE — 6370000000 HC RX 637 (ALT 250 FOR IP): Performed by: COLON & RECTAL SURGERY

## 2022-11-10 PROCEDURE — 80048 BASIC METABOLIC PNL TOTAL CA: CPT

## 2022-11-10 PROCEDURE — 1210000000 HC MED SURG R&B

## 2022-11-10 PROCEDURE — 99024 POSTOP FOLLOW-UP VISIT: CPT | Performed by: COLON & RECTAL SURGERY

## 2022-11-10 PROCEDURE — 2580000003 HC RX 258: Performed by: COLON & RECTAL SURGERY

## 2022-11-10 PROCEDURE — 36415 COLL VENOUS BLD VENIPUNCTURE: CPT

## 2022-11-10 PROCEDURE — 97535 SELF CARE MNGMENT TRAINING: CPT

## 2022-11-10 PROCEDURE — C9113 INJ PANTOPRAZOLE SODIUM, VIA: HCPCS | Performed by: COLON & RECTAL SURGERY

## 2022-11-10 PROCEDURE — 97116 GAIT TRAINING THERAPY: CPT

## 2022-11-10 PROCEDURE — 6360000002 HC RX W HCPCS: Performed by: COLON & RECTAL SURGERY

## 2022-11-10 PROCEDURE — 2580000003 HC RX 258: Performed by: INTERNAL MEDICINE

## 2022-11-10 PROCEDURE — A4216 STERILE WATER/SALINE, 10 ML: HCPCS | Performed by: COLON & RECTAL SURGERY

## 2022-11-10 PROCEDURE — 6370000000 HC RX 637 (ALT 250 FOR IP): Performed by: INTERNAL MEDICINE

## 2022-11-10 PROCEDURE — 6360000002 HC RX W HCPCS: Performed by: INTERNAL MEDICINE

## 2022-11-10 PROCEDURE — 85025 COMPLETE CBC W/AUTO DIFF WBC: CPT

## 2022-11-10 RX ORDER — DOCUSATE SODIUM 100 MG/1
100 CAPSULE, LIQUID FILLED ORAL DAILY
Status: DISCONTINUED | OUTPATIENT
Start: 2022-11-10 | End: 2022-11-11 | Stop reason: HOSPADM

## 2022-11-10 RX ADMIN — POTASSIUM CHLORIDE: 2 INJECTION, SOLUTION, CONCENTRATE INTRAVENOUS at 00:46

## 2022-11-10 RX ADMIN — POTASSIUM CHLORIDE: 2 INJECTION, SOLUTION, CONCENTRATE INTRAVENOUS at 10:49

## 2022-11-10 RX ADMIN — PRAVASTATIN SODIUM 40 MG: 40 TABLET ORAL at 08:29

## 2022-11-10 RX ADMIN — METOPROLOL TARTRATE 2.5 MG: 5 INJECTION, SOLUTION INTRAVENOUS at 08:28

## 2022-11-10 RX ADMIN — NALOXEGOL OXALATE 12.5 MG: 12.5 TABLET, FILM COATED ORAL at 12:43

## 2022-11-10 RX ADMIN — HEPARIN SODIUM 5000 UNITS: 5000 INJECTION INTRAVENOUS; SUBCUTANEOUS at 21:50

## 2022-11-10 RX ADMIN — Medication 10 ML: at 21:52

## 2022-11-10 RX ADMIN — HYDROMORPHONE HYDROCHLORIDE 0.5 MG: 1 INJECTION, SOLUTION INTRAMUSCULAR; INTRAVENOUS; SUBCUTANEOUS at 18:35

## 2022-11-10 RX ADMIN — METOPROLOL TARTRATE 2.5 MG: 5 INJECTION, SOLUTION INTRAVENOUS at 14:05

## 2022-11-10 RX ADMIN — HYDROMORPHONE HYDROCHLORIDE 0.5 MG: 1 INJECTION, SOLUTION INTRAMUSCULAR; INTRAVENOUS; SUBCUTANEOUS at 08:44

## 2022-11-10 RX ADMIN — METOPROLOL TARTRATE 2.5 MG: 5 INJECTION, SOLUTION INTRAVENOUS at 21:47

## 2022-11-10 RX ADMIN — DOCUSATE SODIUM 100 MG: 100 CAPSULE, LIQUID FILLED ORAL at 12:43

## 2022-11-10 RX ADMIN — GABAPENTIN 200 MG: 100 CAPSULE ORAL at 21:44

## 2022-11-10 RX ADMIN — METOPROLOL TARTRATE 2.5 MG: 5 INJECTION, SOLUTION INTRAVENOUS at 00:52

## 2022-11-10 RX ADMIN — POTASSIUM CHLORIDE: 2 INJECTION, SOLUTION, CONCENTRATE INTRAVENOUS at 21:13

## 2022-11-10 RX ADMIN — HEPARIN SODIUM 5000 UNITS: 5000 INJECTION INTRAVENOUS; SUBCUTANEOUS at 05:52

## 2022-11-10 RX ADMIN — ACETAMINOPHEN 650 MG: 325 TABLET ORAL at 21:45

## 2022-11-10 RX ADMIN — Medication 10 ML: at 08:29

## 2022-11-10 RX ADMIN — HYDROMORPHONE HYDROCHLORIDE 0.5 MG: 1 INJECTION, SOLUTION INTRAMUSCULAR; INTRAVENOUS; SUBCUTANEOUS at 14:32

## 2022-11-10 RX ADMIN — SODIUM CHLORIDE, PRESERVATIVE FREE 40 MG: 5 INJECTION INTRAVENOUS at 17:11

## 2022-11-10 RX ADMIN — SODIUM CHLORIDE, PRESERVATIVE FREE 40 MG: 5 INJECTION INTRAVENOUS at 05:51

## 2022-11-10 RX ADMIN — HEPARIN SODIUM 5000 UNITS: 5000 INJECTION INTRAVENOUS; SUBCUTANEOUS at 14:04

## 2022-11-10 ASSESSMENT — PAIN SCALES - GENERAL
PAINLEVEL_OUTOF10: 4
PAINLEVEL_OUTOF10: 3
PAINLEVEL_OUTOF10: 5
PAINLEVEL_OUTOF10: 5

## 2022-11-10 ASSESSMENT — PAIN DESCRIPTION - LOCATION: LOCATION: ABDOMEN

## 2022-11-10 NOTE — PROGRESS NOTES
Physical Therapy Med Surg Daily Treatment Note  Facility/Department: [facility]  Room: X4H196-60       NAME: Pat Carty  : 1935 (80 y.o.)  MRN: [id number]  CODE STATUS: Full Code    Date of Service: 11/10/2022    Patient Diagnosis(es): Small bowel obstruction ([facility] [J27.413]  SBO (small bowel obstruction) [facility]) [K56.609]   Chief Complaint   Patient presents with    Abdominal Pain     Severe, radiates to back, since this am     Patient Active Problem List    Diagnosis Date Noted    Atypical chest pain 2022    History of hypertrophic cardiomyopathy 2022    History of PSVT (paroxysmal supraventricular tachycardia) 2022    Abnormal EKG 2022    Hypertension 2022    SBO (small bowel obstruction) ([facility] 2022    Nausea vomiting and diarrhea 2019        Past Medical History:   Diagnosis Date    Cancer ([facility]     bladder    Hyperlipidemia     Hypertension      Past Surgical History:   Procedure Laterality Date    ABDOMEN SURGERY      large intestine partial removal    APPENDECTOMY      CHOLECYSTECTOMY      HYSTERECTOMY (CERVIX STATUS UNKNOWN)      LAPAROTOMY N/A 2022    EXPLORATORY LAPAROTOMY LYSIS OF ADHESIONS performed by Renay Perez MD at [facility]       Chart Reviewed: Yes  Family / Caregiver Present: No    Restrictions:  Restrictions/Precautions: Fall Risk    SUBJECTIVE:   Subjective: \"I was doing better yesterday. \"    Pain  Pain: 5/10 abdomen. pre/post tx. pt recently medicated. OBJECTIVE:        Bed mobility  Rolling to Left: Moderate assistance  Supine to Sit: Moderate assistance  Sit to Supine: Moderate assistance  Bed Mobility Comments: Step by step cues for log roll technique. Limited by pain. Assist to lift LEs into bed and lifting trunk upright on EOB. Slow to complete. Transfers  Sit to Stand: Stand by assistance  Stand to Sit: Stand by assistance  Comment: WW, vc's for  hand placement and sequencing.     Ambulation  Surface: Level tile  Device: No Device;Rolling Walker  Assistance: Stand by assistance  Quality of Gait: without AD severe FF posture, pt reaching for furniture for stability. with [employer] improved posture and stability, unsafe to ambulate without [employer] this session. Distance: 13' with a turn. Activity Tolerance  Activity Tolerance: Patient limited by pain          ASSESSMENT   Assessment: pt with decreased ability compared to yesterday, needing Mod A to complete bed mobility, and needs a [employer] for safe gait training. pt reports increased pain this date. Discharge Recommendations:  Continue to assess pending progress, Patient would benefit from continued therapy after discharge         Goals  Long Term Goals  Long Term Goal 1: Pt to complete bed mobility with indep  Long Term Goal 2: Pt to complete transfers with indep  Long Term Goal 3: Pt to ambulate 50-150ft with LRD and indep  Long Term Goal 4: Pt to manage 3 steps with HR and indep  Long Term Goal 5: Pt to complete HEP with indep    PLAN    General Plan: 1 time a day 3-6 times a week  Safety Devices  Type of Devices: Bed alarm in place, Call light within reach, Left in bed     AMPAC (6 CLICK) BASIC MOBILITY  AM-PAC Inpatient Mobility Raw Score : 16      Therapy Time   Individual   Time In 1437   Time Out 1500   Minutes 23      BM/Trsf: 15  Gait: [address], PTA, 11/10/22 at 3:16 PM         Definitions for assistance levels  Independent = pt does not require any physical supervision or assistance from another person for activity completion. Device may be needed.   Stand by assistance = pt requires verbal cues or instructions from another person, close to but not touching, to perform the activity  Minimal assistance= pt performs 75% or more of the activity; assistance is required to complete the activity  Moderate assistance= pt performs 50% of the activity; assistance is required to complete the activity  Maximal assistance = pt performs 25% of the activity; assistance is required to complete the activity  Dependent = pt requires total physical assistance to accomplish the task

## 2022-11-10 NOTE — CARE COORDINATION
This LSW was notified that patient has received pre-cert to be transferred to [facility]. Pre-cert is approved through midnight on 11/11/22. I notified patient and  at bedside of this. Patient will be transferred when medically cleared. ANDRES / FLORESITA to follow.   Electronically signed by KWADWO Dodge LSW on 11/10/22 at 11:37 AM EST

## 2022-11-10 NOTE — PLAN OF CARE
Nutrition Problem #1: Inadequate oral intake  Intervention: Food and/or Nutrient Delivery: Continue Current Diet, Start Oral Nutrition Supplement  Nutritional    Po > 50% advance po diet

## 2022-11-10 NOTE — PROGRESS NOTES
Hospitalist Daily Progress Note  Name: Tamiko Abdul  Age: 80 y.o. Gender: female  CodeStatus: Full Code  Allergies: Phenazopyridine    Chief Complaint:Abdominal Pain (Severe, radiates to back, since this am)    Primary Care Provider: Mehnaz Penn PA-C  InpatientTreatment Team: Treatment Team: Attending Provider: Kevin Williamson DO; Consulting Physician: Mary Shanks MD; Consulting Physician: Aj Dennis DO; Surgeon: Mary Shanks MD; Utilization Reviewer: Zuleyka Kathleen, FENG; Patient Care Tech: Jackson Carlos; Registered Nurse: Dia Carlson, RN; Registered Nurse: Tyrel Morse, RN; : Michelle Rodríguez, RN; Registered Nurse: Mathew Moran RN  Admission Date: 11/5/2022      Subjective: Patient is seen evaluated bedside. Tolerating clears. Passing gas, no BM. OOB to chair today. No new concerns.  at bedside and updated. Physical Exam  Constitutional:       Appearance: Normal appearance. She is ill-appearing. She is not diaphoretic. HENT:      Head: Normocephalic and atraumatic. Nose: Nose normal.      Mouth/Throat:      Mouth: Mucous membranes are moist.      Pharynx: Oropharynx is clear. Eyes:      Conjunctiva/sclera: Conjunctivae normal.      Pupils: Pupils are equal, round, and reactive to light. Cardiovascular:      Rate and Rhythm: Normal rate. Heart sounds: No murmur heard. No friction rub. No gallop. Pulmonary:      Breath sounds: No wheezing, rhonchi or rales. Abdominal:      General: There is distension. Tenderness: There is abdominal tenderness. There is guarding. Comments: Dressings CDI   Musculoskeletal:         General: Normal range of motion. Neurological:      General: No focal deficit present. Mental Status: She is alert and oriented to person, place, and time. Psychiatric:         Mood and Affect: Mood normal.         Thought Content:  Thought content normal.         Judgment: Judgment normal.       Review of Systems  14 point ROS reviewed negative except for as above  Medications:  Reviewed    Infusion Medications:    IV infusion builder 125 mL/hr at 11/10/22 1049    sodium chloride       Scheduled Medications:    metoprolol  2.5 mg IntraVENous Q6H    [Held by provider] aspirin  81 mg Oral Daily    gabapentin  200 mg Oral Nightly    pravastatin  40 mg Oral Daily    heparin (porcine)  5,000 Units SubCUTAneous 3 times per day    pantoprazole (PROTONIX) 40 mg injection  40 mg IntraVENous Q12H    sodium chloride flush  5-40 mL IntraVENous 2 times per day     PRN Meds: HYDROmorphone **OR** HYDROmorphone, ketorolac, hydrALAZINE, phenol, sodium chloride flush, sodium chloride, ondansetron **OR** ondansetron, polyethylene glycol, acetaminophen **OR** acetaminophen    Labs:   Recent Labs     11/08/22  0632 11/09/22  0940 11/10/22  0530   WBC 7.0 8.2 6.6   HGB 12.2 11.5* 9.7*   HCT 35.8* 35.2* 29.4*    121* 128*       Recent Labs     11/08/22  0632 11/09/22  0940 11/10/22  0529    138 137   K 3.2* 4.4 4.8    103 104   CO2 21 20 25   BUN 15 18 17   CREATININE 0.38* 0.39* 0.40*   CALCIUM 8.2* 8.7 8.6       No results for input(s): AST, ALT, BILIDIR, BILITOT, ALKPHOS in the last 72 hours. No results for input(s): INR in the last 72 hours. No results for input(s): Pamla Clines in the last 72 hours. Urinalysis:   Lab Results   Component Value Date/Time    NITRU Negative 11/05/2022 04:45 PM    WBCUA 10-20 11/05/2022 04:45 PM    BACTERIA Negative 11/05/2022 04:45 PM    RBCUA 0-2 11/05/2022 04:45 PM    BLOODU Negative 11/05/2022 04:45 PM    SPECGRAV 1.064 11/05/2022 04:45 PM    GLUCOSEU Negative 11/05/2022 04:45 PM       Radiology:   Most recent    Chest CT      WITH CONTRAST:No results found for this or any previous visit. WITHOUT CONTRAST: No results found for this or any previous visit.       CXR      2-view: Results for orders placed during the hospital encounter of 03/22/19    XR CHEST STANDARD (2 VW)    Narrative  EXAMINATION: XR CHEST (2 VW). DATE AND TIME:3/24/2019 1:00 PM    CLINICAL HISTORY: Shortness of breath   Eval for new infiltrate    COMPARISONS: 3/22/2019    FINDINGS: Right paratracheal soft tissue density consistent with thyroid lesion. This is unchanged. Heart size normal. Lungs clear. Pleural angles smooth. Bones intact. Impression  NO  ACTIVE LUNG DISEASE. Portable: Results for orders placed during the hospital encounter of 03/22/19    XR CHEST PORTABLE    Narrative  Portable chest radiograph    History: Fever    Technique: AP portable view of the chest obtained. Comparison: None available    Findings:    Atherosclerotic calcification of the thoracic aorta. Heart size is at the upper limits of normal. Coarsening of the pulmonary interstitium. Linear bibasilar pulmonary opacities clinical subsegmental atelectasis. No pneumothorax, pleural effusion, or  focal consolidation. Degenerative changes of the spine. Impression  No acute intrathoracic process. Echo No results found for this or any previous visit. Assessment/Plan:    Active Hospital Problems    Diagnosis Date Noted    Atypical chest pain [R07.89] 11/08/2022     Priority: High    History of hypertrophic cardiomyopathy [Z86.79] 11/08/2022     Priority: High    History of PSVT (paroxysmal supraventricular tachycardia) [Z86.79] 11/08/2022     Priority: High    Abnormal EKG [R94.31] 11/08/2022     Priority: High    Hypertension [I10] 11/08/2022     Priority: High    SBO (small bowel obstruction) ([facility] [K56.609] 11/05/2022     Priority: Medium     Small bowel obstruction: POD 3 pain controlled. Diet advancement per surgeon. DVT ppx per surgeon. NG to LIS. Continue IV fluids  mL/h. Consider movantik/docuslate if no BM. Atypical chest pain with a history of HCM: Cardiology consulted Echo results normal.  Metoprolol 2.5 mg every 6 hours ordered.   Appreciate cardiac recommendations regarding restratification. Continue optimized electrolytes. Continue statin    Stress ulcer probe access IV Protonix    Will dc oxybutynin, amlodipine, bentyl in order to reduce risk of subsequent SBO    DVT prophylaxis per surgery. Scd now    Additional work up or/and treatment plan may be added today or then after based on clinical progression. I am managing a portion of pt care. Some medical issues are handled byother specialists. Additional work up and treatment should be done in out pt setting by pt PCP and other out pt providers. In addition to examining and evaluating pt, I spent additional time explaining care, normaland abnormal findings, and treatment plan. All of pt questions were answered. Counseling, diet and education were provided. Case will be discussed with nursing staff when appropriate. Family will be updated if and whenappropriate.       Electronically signed by Dalia Yao DO on 11/10/2022 at 12:32 PM

## 2022-11-10 NOTE — PROGRESS NOTES
Patient doing well. Denies nausea or vomiting. Good pain control    Awaiting bowel function. Milk of magnesia given. Patient on Movantik. Abdomen soft and nondistended. Will wait for bowel function.   Patient doing well

## 2022-11-10 NOTE — PROGRESS NOTES
Comprehensive Nutrition Assessment    Type and Reason for Visit:  Initial, NPO/Clear Liquid    Nutrition Recommendations/Plan:   Continue Clear liquid diet  Add clear oral supplement to aid in meeting energy/protein needs, until pt able to tolerate po diet     Malnutrition Assessment:  Malnutrition Status:  No malnutrition (11/10/22 1237)    Context:  Social/Environmental Circumstances     Findings of the 6 clinical characteristics of malnutrition:  Energy Intake:  Mild decrease in energy intake (Comment)  Weight Loss:  No significant weight loss     Body Fat Loss:  No significant body fat loss     Muscle Mass Loss:  No significant muscle mass loss    Fluid Accumulation:  No significant fluid accumulation     Strength:  Not Performed    Nutrition Assessment:    Pt is at risk for malnutrition due to NPO/Clear liquids x 5 days. NGT removed and clear liquids started, will start a clear liquid oral nutrition supplement TID until po diet advanced and tolerated > 50%    Nutrition Related Findings:    PMH: htn, hld, bladder cancer, appendectomy, cholecystectomy, partial colon resection for diverticulitis 25 years ago. admitted with SBO, s/p exlap/lysis adhesions 11/7, NGT removed today started clear liquids. labs/meds reviewed, IVF: KCL in LR @ 125 ml/hr via peripheral line Wound Type: None       Current Nutrition Intake & Therapies:    Average Meal Intake: 26-50%  Average Supplements Intake: None Ordered  ADULT DIET; Clear Liquid  ADULT ORAL NUTRITION SUPPLEMENT; Breakfast, Lunch, Dinner; Clear Liquid Oral Supplement    Anthropometric Measures:  Height: 5' 1\" (154.9 cm)  Ideal Body Weight (IBW): 105 lbs (48 kg)    Admission Body Weight: 134 lb (60.8 kg)  Current Body Weight: 139 lb (63 kg) (11/9),   > 100% IBW. Weight Source: Bed Scale  Current BMI (kg/m2): 26.3  Usual Body Weight: 136 lb (61.7 kg) (8/2021)  % Weight Change (Calculated): 2.2                    BMI Categories: Overweight (BMI 25.0-29. 9)    Estimated Daily Nutrient Needs:  Energy Requirements Based On: Kcal/kg  Weight Used for Energy Requirements: Current  Energy (kcal/day): ~1525 kcals @ 25 kcal/kg  Weight Used for Protein Requirements: Current  Protein (g/day): 73 g protein @ 1.2 g/kg  Method Used for Fluid Requirements: 1 ml/kcal  Fluid (ml/day): ~1525    Nutrition Diagnosis:   Inadequate oral intake related to altered GI function as evidenced by NPO or clear liquid status due to medical condition    Nutrition Interventions:   Food and/or Nutrient Delivery: Continue Current Diet, Start Oral Nutrition Supplement  Nutrition Education/Counseling: Education not indicated  Coordination of Nutrition Care: Continue to monitor while inpatient       Goals:     Goals: PO intake 75% or greater, by next RD assessment, Initiate PO diet       Nutrition Monitoring and Evaluation:   Behavioral-Environmental Outcomes: None Identified  Food/Nutrient Intake Outcomes: Diet Advancement/Tolerance, Food and Nutrient Intake, Supplement Intake  Physical Signs/Symptoms Outcomes: GI Status, Meal Time Behavior, Weight    Discharge Planning:    No discharge needs at this time     Lizet Michaud RD, LD

## 2022-11-10 NOTE — PROGRESS NOTES
0845: Assessment completed. VSS. Pt is on RA, SPO2 95%. Patient c/o of 5/10 abd pain--medicated with PRN Dilaudid per MAR. ABD dressing is CDI with binder in place. Pt up to chair at this time with stand by assist. Whitlock catheter draining jarek urine. Spouse at bedside. Denies further needs. Safety maintained in room. Comfort measures provided     1715: Whitlock taken out at this time per orders. [id number]: Patient voided at this time on the commode. Returned back to bed. Patient medicated with Prn Dilaudid per [facility] throughout shift. Denies needs. Bed alarm on.      Electronically signed by Jeromy Monet RN on 11/10/22 at 6:47 PM EST

## 2022-11-11 ENCOUNTER — OFFICE VISIT (OUTPATIENT)
Dept: GERIATRIC MEDICINE | Age: 87
End: 2022-11-11

## 2022-11-11 VITALS
WEIGHT: 134 LBS | DIASTOLIC BLOOD PRESSURE: 60 MMHG | HEART RATE: 98 BPM | SYSTOLIC BLOOD PRESSURE: 147 MMHG | BODY MASS INDEX: 25.3 KG/M2 | TEMPERATURE: 98.4 F | OXYGEN SATURATION: 95 % | HEIGHT: 61 IN | RESPIRATION RATE: 18 BRPM

## 2022-11-11 DIAGNOSIS — I48.19 PERSISTENT ATRIAL FIBRILLATION (HCC): Primary | ICD-10-CM

## 2022-11-11 LAB — SARS-COV-2, NAAT: DETECTED

## 2022-11-11 PROCEDURE — 87635 SARS-COV-2 COVID-19 AMP PRB: CPT

## 2022-11-11 PROCEDURE — 6370000000 HC RX 637 (ALT 250 FOR IP): Performed by: COLON & RECTAL SURGERY

## 2022-11-11 PROCEDURE — 2580000003 HC RX 258: Performed by: COLON & RECTAL SURGERY

## 2022-11-11 PROCEDURE — C9113 INJ PANTOPRAZOLE SODIUM, VIA: HCPCS | Performed by: COLON & RECTAL SURGERY

## 2022-11-11 PROCEDURE — 2500000003 HC RX 250 WO HCPCS: Performed by: COLON & RECTAL SURGERY

## 2022-11-11 PROCEDURE — 6360000002 HC RX W HCPCS: Performed by: INTERNAL MEDICINE

## 2022-11-11 PROCEDURE — 6360000002 HC RX W HCPCS: Performed by: COLON & RECTAL SURGERY

## 2022-11-11 PROCEDURE — 6370000000 HC RX 637 (ALT 250 FOR IP): Performed by: INTERNAL MEDICINE

## 2022-11-11 PROCEDURE — 99024 POSTOP FOLLOW-UP VISIT: CPT | Performed by: COLON & RECTAL SURGERY

## 2022-11-11 PROCEDURE — A4216 STERILE WATER/SALINE, 10 ML: HCPCS | Performed by: COLON & RECTAL SURGERY

## 2022-11-11 PROCEDURE — 2580000003 HC RX 258: Performed by: INTERNAL MEDICINE

## 2022-11-11 RX ORDER — POLYETHYLENE GLYCOL 3350 17 G/17G
17 POWDER, FOR SOLUTION ORAL DAILY PRN
Qty: 527 G | Refills: 1 | DISCHARGE
Start: 2022-11-11 | End: 2022-12-01

## 2022-11-11 RX ORDER — PSEUDOEPHEDRINE HCL 30 MG
100 TABLET ORAL DAILY
DISCHARGE
Start: 2022-11-12

## 2022-11-11 RX ADMIN — METOPROLOL TARTRATE 2.5 MG: 5 INJECTION, SOLUTION INTRAVENOUS at 09:56

## 2022-11-11 RX ADMIN — HYDROMORPHONE HYDROCHLORIDE 0.5 MG: 1 INJECTION, SOLUTION INTRAMUSCULAR; INTRAVENOUS; SUBCUTANEOUS at 14:56

## 2022-11-11 RX ADMIN — SODIUM CHLORIDE, PRESERVATIVE FREE 40 MG: 5 INJECTION INTRAVENOUS at 05:46

## 2022-11-11 RX ADMIN — METOPROLOL TARTRATE 2.5 MG: 5 INJECTION, SOLUTION INTRAVENOUS at 01:55

## 2022-11-11 RX ADMIN — HYDROMORPHONE HYDROCHLORIDE 1 MG: 1 INJECTION, SOLUTION INTRAMUSCULAR; INTRAVENOUS; SUBCUTANEOUS at 01:50

## 2022-11-11 RX ADMIN — ONDANSETRON 4 MG: 4 TABLET, ORALLY DISINTEGRATING ORAL at 12:15

## 2022-11-11 RX ADMIN — HYDROMORPHONE HYDROCHLORIDE 0.5 MG: 1 INJECTION, SOLUTION INTRAMUSCULAR; INTRAVENOUS; SUBCUTANEOUS at 08:52

## 2022-11-11 RX ADMIN — DOCUSATE SODIUM 100 MG: 100 CAPSULE, LIQUID FILLED ORAL at 08:52

## 2022-11-11 RX ADMIN — HEPARIN SODIUM 5000 UNITS: 5000 INJECTION INTRAVENOUS; SUBCUTANEOUS at 05:48

## 2022-11-11 RX ADMIN — POTASSIUM CHLORIDE: 2 INJECTION, SOLUTION, CONCENTRATE INTRAVENOUS at 05:45

## 2022-11-11 RX ADMIN — HEPARIN SODIUM 5000 UNITS: 5000 INJECTION INTRAVENOUS; SUBCUTANEOUS at 14:56

## 2022-11-11 RX ADMIN — PRAVASTATIN SODIUM 40 MG: 40 TABLET ORAL at 08:52

## 2022-11-11 ASSESSMENT — PAIN DESCRIPTION - DESCRIPTORS
DESCRIPTORS: ACHING
DESCRIPTORS: ACHING

## 2022-11-11 ASSESSMENT — PAIN DESCRIPTION - LOCATION
LOCATION: ABDOMEN

## 2022-11-11 ASSESSMENT — PAIN SCALES - GENERAL
PAINLEVEL_OUTOF10: 6
PAINLEVEL_OUTOF10: 7
PAINLEVEL_OUTOF10: 7
PAINLEVEL_OUTOF10: 3
PAINLEVEL_OUTOF10: 3

## 2022-11-11 ASSESSMENT — PAIN DESCRIPTION - ORIENTATION
ORIENTATION: MID
ORIENTATION: LEFT;RIGHT;LOWER
ORIENTATION: MID

## 2022-11-11 ASSESSMENT — PAIN - FUNCTIONAL ASSESSMENT
PAIN_FUNCTIONAL_ASSESSMENT: PREVENTS OR INTERFERES SOME ACTIVE ACTIVITIES AND ADLS
PAIN_FUNCTIONAL_ASSESSMENT: ACTIVITIES ARE NOT PREVENTED

## 2022-11-11 NOTE — PROGRESS NOTES
0845:  Initial assessment completed as documented. Dilaudid administered for complaints of abdomen incision pain. Tolerated clear liquids without emesis. Incision well approximated with staples dry et intact. 1200:  Patient up to [facility] with 1 moderate assist.  Void of clear yellow urine. Returned to bed. Full liquids for lunch. Tolerated without emesis. 1530:  Report called to [facility]. [facility] will take patient at 1600, via w/c.

## 2022-11-11 NOTE — PLAN OF CARE
Problem: Discharge Planning  Goal: Discharge to home or other facility with appropriate resources  Outcome: Completed     Problem: Pain  Goal: Verbalizes/displays adequate comfort level or baseline comfort level  Outcome: Completed     Problem: Safety - Adult  Goal: Free from fall injury  Outcome: Completed  Flowsheets (Taken 11/11/2022 1436)  Free From Fall Injury:   Based on caregiver fall risk screen, instruct family/caregiver to ask for assistance with transferring infant if caregiver noted to have fall risk factors   Instruct family/caregiver on patient safety     Problem: ABCDS Injury Assessment  Goal: Absence of physical injury  Outcome: Completed  Flowsheets (Taken 11/11/2022 1436)  Absence of Physical Injury: Implement safety measures based on patient assessment     Problem: Skin/Tissue Integrity  Goal: Absence of new skin breakdown  Outcome: Completed     Problem: Nutrition Deficit:  Goal: Optimize nutritional status  Outcome: Completed

## 2022-11-11 NOTE — DISCHARGE SUMMARY
[facility] Medicine Discharge Summary    Flaquita Gomez  :  1935  MRN:  [id number]    Admit date:  2022  Discharge date:  2022    Admitting Physician:  Julienne Calzada DO  Primary Care Physician:  George Simon PA-C    Discharge Diagnoses:    Principal Problem:    SBO (small bowel obstruction) ([address]  Active Problems:    Atypical chest pain    History of hypertrophic cardiomyopathy    History of PSVT (paroxysmal supraventricular tachycardia)    Abnormal EKG    Hypertension  Resolved Problems:    * No resolved hospital problems. *    Chief Complaint   Patient presents with    Abdominal Pain     Severe, radiates to back, since this am       Condition: improved   Activity: no strenuous activity   Diet: regular  Disposition: SNF  Functional Status: ambulatory with assistance    Significant Findings:      CT Abd/Pelvis:  Dilated stomach and proximal small bowel loops with no wall thickening. Findings suggesting possible small bowel obstruction with change in caliber   suggestive of the lower abdomen. No evidence of free intraperitoneal air. Brief Op Note:   Findings: Adhesions throughout abdomen. Hospital Course:   80-year-old female with prior SBO was admitted for high-grade bowel obstruction and had lysis of adhesions performed by colorectal surgery. She was discharged when her bowel function returned and she was tolerating p.o. She will continue her rehabilitation at skilled nursing facility. She was incidentally found to have COVID-19 on the day of discharge. She was otherwise asymptomatic. Exam on Discharge:   BP (!) 147/60   Pulse 98   Temp 98.4 °F (36.9 °C)   Resp 18   Ht 5' 1\" (1.549 m)   Wt 134 lb (60.8 kg)   SpO2 95%   BMI 25.32 kg/m²   General appearance: alert, cooperative and no distress.   Elderly  Mental Status: oriented to person, place and time and normal affect  Lungs: clear to auscultation bilaterally, normal effort  Heart: regular rate and rhythm, no murmur  Abdomen: soft, nontender, nondistended, bowel sounds present, no masses.   Dressing dry  Extremities: no edema, redness, tenderness in the calves  Skin: no gross lesions, rashes    Discharge Medication List:     Medication List        START taking these medications      docusate 100 MG Caps  Commonly known as: COLACE, DULCOLAX  Take 100 mg by mouth daily  Start taking on: November 12, 2022     polyethylene glycol 17 g packet  Commonly known as: GLYCOLAX  Take 17 g by mouth daily as needed for Constipation            CONTINUE taking these medications      acetaminophen 500 MG tablet  Commonly known as: TYLENOL     amLODIPine 2.5 MG tablet  Commonly known as: NORVASC     aspirin 81 MG EC tablet     Cholecalciferol 50 MCG (2000 UT) Caps     diclofenac sodium 1 % Gel  Commonly known as: VOLTAREN     gabapentin 100 MG capsule  Commonly known as: NEURONTIN     melatonin 10 MG Caps capsule     metoprolol tartrate 25 MG tablet  Commonly known as: LOPRESSOR     mupirocin 2 % ointment  Commonly known as: BACTROBAN     nitroGLYCERIN 0.4 MG SL tablet  Commonly known as: NITROSTAT     ondansetron 4 MG disintegrating tablet  Commonly known as: Zofran ODT  Take 1 tablet by mouth every 8 hours as needed for Nausea     pravastatin 40 MG tablet  Commonly known as: PRAVACHOL            STOP taking these medications      dicyclomine 10 MG capsule  Commonly known as: Bentyl     meloxicam 7.5 MG tablet  Commonly known as: Mobic     oxybutynin 5 MG tablet  Commonly known as: DITROPAN               Where to Get Your Medications        Information about where to get these medications is not yet available    Ask your nurse or doctor about these medications  docusate 100 MG Caps  polyethylene glycol 17 g packet         DC time 37 minutes    Signed:  Mark Amezcua DO  11/11/2022, 2:34 PM

## 2022-11-11 NOTE — PROGRESS NOTES
Pt Name: Genie Record Number: [id number]  Date of Birth 1935   Admit date 2022  5:06 PM  Today's Date: 2022     ASSESSMENT  1. Hospital day # 6  2 postop day #3 lysis of adhesions for bowel obstruction  3. Bowel movement last night    PLAN  1. Full liquid diet  2. Decrease IV fluids    SUBJECTIVE  Chief complaint: Follow-up laparotomy with adhesiolysis  Afebrile, vital signs are stable. She denies any nausea or vomiting, bowel movement last night She is tolerating a ADULT DIET; Clear Liquid  ADULT ORAL NUTRITION SUPPLEMENT; Breakfast, Lunch, Dinner; Clear Liquid Oral Supplement. Her pain is well controlled on current medications. has a past medical history of Cancer ([address] Hyperlipidemia, and Hypertension. CURRENT MEDS  Scheduled Meds:   docusate sodium  100 mg Oral Daily    metoprolol  2.5 mg IntraVENous Q6H    [Held by provider] aspirin  81 mg Oral Daily    gabapentin  200 mg Oral Nightly    pravastatin  40 mg Oral Daily    heparin (porcine)  5,000 Units SubCUTAneous 3 times per day    pantoprazole (PROTONIX) 40 mg injection  40 mg IntraVENous Q12H    sodium chloride flush  5-40 mL IntraVENous 2 times per day     Continuous Infusions:   IV infusion builder 125 mL/hr at 22 0545    sodium chloride       PRN Meds:.magnesium hydroxide, HYDROmorphone **OR** HYDROmorphone, hydrALAZINE, phenol, sodium chloride flush, sodium chloride, ondansetron **OR** ondansetron, polyethylene glycol, acetaminophen **OR** acetaminophen    OBJECTIVE  CURRENT VITALS:  height is 5' 1\" (1.549 m) and weight is 134 lb (60.8 kg). Her oral temperature is 98.1 °F (36.7 °C). Her blood pressure is 133/79 and her pulse is 102 (abnormal). Her respiration is 6 (abnormal) and oxygen saturation is 95%.    Temperature Range (24h):Temp: 98.1 °F (36.7 °C) Temp  Av.1 °F (36.7 °C)  Min: 98.1 °F (36.7 °C)  Max: 98.1 °F (36.7 °C)  BP Range (77K): Systolic (42UNP), ZYW:512 , Min:125 , MES:501     Diastolic (24hrs), Av, Min:44, Max:79    Pulse Range (24h): Pulse  Av.5  Min: 86  Max: 102  Respiration Range (24h): Resp  Av.3  Min: 6  Max: 18    GENERAL: alert, no distress  LUNGS: clear to ausculation, without wheezes, rales or rhonci  HEART: normal rate and regular rhythm  ABDOMEN: non-distended, dressing dry, bowel sounds present in all 4 quadrants, and no guarding or peritoneal signs  EXTERMITY: no cyanosis, clubbing or edema  In: 350 [P.O.:350]  Out: 550 [Urine:550]      LABS  Recent Labs     22  0940 11/10/22  0529 11/10/22  0530   WBC 8.2  --  6.6   HGB 11.5*  --  9.7*   HCT 35.2*  --  29.4*   *  --  128*    137  --    K 4.4 4.8  --     104  --    CO2 20 25  --    BUN 18 17  --    CREATININE 0.39* 0.40*  --    CALCIUM 8.7 8.6  --       No results for input(s): PTT, INR in the last 72 hours. Invalid input(s): PT  No results for input(s): AST, ALT, BILITOT, BILIDIR, AMYLASE, LIPASE, LDH, LACTA in the last 72 hours. RADIOLOGY  CT ABDOMEN PELVIS W IV CONTRAST Additional Contrast? None    Result Date: 2022  EXAMINATION: CT OF THE ABDOMEN AND PELVIS WITH CONTRAST 2022 6:27 pm TECHNIQUE: CT of the abdomen and pelvis was performed with the administration of intravenous contrast. Multiplanar reformatted images are provided for review. Automated exposure control, iterative reconstruction, and/or weight based adjustment of the mA/kV was utilized to reduce the radiation dose to as low as reasonably achievable. COMPARISON: None. HISTORY: ORDERING SYSTEM PROVIDED HISTORY: Lower ab pain TECHNOLOGIST PROVIDED HISTORY: Additional Contrast?->None Reason for exam:->Lower ab pain Decision Support Exception - unselect if not a suspected or confirmed emergency medical condition->Emergency Medical Condition (MA) What reading provider will be dictating this exam?->CRC FINDINGS: Lower Chest: The lung bases are grossly clear. Organs: The liver is homogeneous in appearance.   Spleen is unremarkable. Gallbladder is been surgically removed. There is no intrahepatic or extrahepatic biliary ductal dilatation. The pancreas is heterogeneous. No underlying mass or lesion. Both adrenal glands are within normal limits. Small cyst identified on the left kidney. No stones or distension seen in the renal collecting system. Small cyst on the right kidney. GI/Bowel: Stomach is mildly distended with fluid. No wall thickening. There is some mildly dilated proximal jejunum suggestive of a proximal to mid small bowel obstruction. The transition point and change in caliber is not well seen. The distal small bowel is decompressed. There is stool seen scattered diffusely throughout the colon. Diverticulosis with no evidence of obvious obstruction. No gross free intraperitoneal air. Pelvis: The bladder is unremarkable with no wall thickening. Uterus has been surgically removed. Peritoneum/Retroperitoneum: No abdominal retroperitoneal lymphadenopathy. No free fluid or free air. No abnormal mass or fluid collections identified. Bones/Soft Tissues: The bony structures reveal degenerative changes seen within the spine and pelvis. Small umbilical hernia containing fat only. Dilated stomach and proximal small bowel loops with no wall thickening. Findings suggesting possible small bowel obstruction with change in caliber suggestive of the lower abdomen. No evidence of free intraperitoneal air.      Electronically signed by Carlos Shaffer MD on 11/11/2022 at 7:10 AM

## 2022-11-11 NOTE — CARE COORDINATION
This LSW met with patient at bedside this am.I notified patient that transportation  via [place] [id number] will transfer her to [facility] at 4:00pm today, 11/11/2022. IMM completed. Tacey Prader, RN, patient and Clarence Imus , liaison are all aware.   Electronically signed by KWADWO Grewal, LSW on 11/11/22 at 10:51 AM EST

## 2022-11-11 NOTE — PROGRESS NOTES
Shift assessment completed. Pt ANOx4 on room air. Up x1 to the [facility]. Johan and staples still CDI. Pt comlpained of mild pain in the abdomen, received tylenol.

## 2022-11-13 NOTE — PROGRESS NOTES
Physical Therapy  Facility/Department: [facility] MED SURG N348/[id number]  Physical Therapy Discharge      NAME: Randy Westfall    : 1935 (80 y.o.)  MRN: [id number]    Account: [de-identified]  Gender: female      Patient has been discharged from acute care hospital. DC patient from current PT program.      Electronically signed by Jason Scott PT on 22 at 8:55 AM EST

## 2022-11-15 ENCOUNTER — OFFICE VISIT (OUTPATIENT)
Dept: GERIATRIC MEDICINE | Age: 87
End: 2022-11-15
Payer: MEDICARE

## 2022-11-15 DIAGNOSIS — K56.609 SBO (SMALL BOWEL OBSTRUCTION) (HCC): Primary | ICD-10-CM

## 2022-11-15 DIAGNOSIS — U07.1 COVID-19: ICD-10-CM

## 2022-11-15 PROCEDURE — 1123F ACP DISCUSS/DSCN MKR DOCD: CPT | Performed by: INTERNAL MEDICINE

## 2022-11-15 PROCEDURE — 99308 SBSQ NF CARE LOW MDM 20: CPT | Performed by: INTERNAL MEDICINE

## 2022-11-16 ENCOUNTER — TELEPHONE (OUTPATIENT)
Dept: SURGERY | Age: 87
End: 2022-11-16

## 2022-11-16 NOTE — TELEPHONE ENCOUNTER
Pt had ex lap on 11/7/22. [facility]. Pt is there and on a clear liquid diet. Requesting order to advance to solid foods as tolerated. The physician and/or dietician at the facility will not give the order. [id number] [address] for order? Call Lu at [facility] at [phone]. Ok to leave message on her VM if she doesn't answer.

## 2022-11-16 NOTE — TELEPHONE ENCOUNTER
Per Dr. Joanie Dandy, yes, can advance diet as tolerated. LM on VM to inform Lu at [facility] to inform her.

## 2022-11-22 ENCOUNTER — OFFICE VISIT (OUTPATIENT)
Dept: SURGERY | Age: 87
End: 2022-11-22

## 2022-11-22 VITALS
OXYGEN SATURATION: 97 % | WEIGHT: 134 LBS | BODY MASS INDEX: 25.3 KG/M2 | HEART RATE: 72 BPM | HEIGHT: 61 IN | TEMPERATURE: 97.8 F

## 2022-11-22 DIAGNOSIS — K56.609 SBO (SMALL BOWEL OBSTRUCTION) (HCC): Primary | ICD-10-CM

## 2022-11-22 PROCEDURE — 99024 POSTOP FOLLOW-UP VISIT: CPT | Performed by: COLON & RECTAL SURGERY

## 2022-11-22 NOTE — PROGRESS NOTES
Subjective:      Patient ID: Flaquita Gomez is a 80 y.o. female who presents for:  Chief Complaint   Patient presents with    Post-Op Check       She returns to the office 15 days out from a lysis of adhesion for small bowel obstruction. She is at [facility] recovering. She is tolerating a diet. Bowels are working. Past Medical History:   Diagnosis Date    Cancer ([address] 1999    bladder    Hyperlipidemia     Hypertension      Past Surgical History:   Procedure Laterality Date    ABDOMEN SURGERY      large intestine partial removal    APPENDECTOMY      CHOLECYSTECTOMY      HYSTERECTOMY (CERVIX STATUS UNKNOWN)      LAPAROTOMY N/A 11/7/2022    EXPLORATORY LAPAROTOMY LYSIS OF ADHESIONS performed by Kimberly Quintero MD at [address] [place] History     Socioeconomic History    Marital status:      Spouse name: Not on file    Number of children: Not on file    Years of education: Not on file    Highest education level: Not on file   Occupational History    Not on file   Tobacco Use    Smoking status: Never    Smokeless tobacco: Never   Substance and Sexual Activity    Alcohol use: No    Drug use: No    Sexual activity: Not on file   Other Topics Concern    Not on file   Social History Narrative    Not on file     Social Determinants of Health     Financial Resource Strain: Not on file   Food Insecurity: Not on file   Transportation Needs: Not on file   Physical Activity: Not on file   Stress: Not on file   Social Connections: Not on file   Intimate Partner Violence: Not on file   Housing Stability: Not on file     Family History   Problem Relation Age of Onset    Coronary Art Dis Brother      Allergies:  Phenazopyridine    Review of Systems    Objective:    Pulse 72   Temp 97.8 °F (36.6 °C) (Temporal)   Ht 5' 1\" (1.549 m)   Wt 134 lb (60.8 kg)   SpO2 97%   BMI 25.32 kg/m²     Physical Exam  To external retention sutures and staples were removed. Her incision looks fine.   Steri-Strips were placed for epithelial support. Abdomen soft and nondistended       Assessment/Plan:          Diagnosis Orders   1. SBO (small bowel obstruction) [address] [address] [facility]          She will return back to see me in the office as needed    Steri-Strips for 1 week. They can be removed following that. Patient has no barriers to shower. Please call if any questions. Please note this report has beenpartially produced using speech recognition software and may cause contain errors related to that system including grammar, punctuation and spelling as well as words and phrases that may seem inappropriate.  If there arequestions or concerns please feel free to contact me to clarify

## 2022-11-23 ENCOUNTER — OFFICE VISIT (OUTPATIENT)
Dept: GERIATRIC MEDICINE | Age: 87
End: 2022-11-23
Payer: MEDICARE

## 2022-11-23 DIAGNOSIS — Z87.19 HX SBO: Primary | ICD-10-CM

## 2022-11-23 DIAGNOSIS — K56.50 SMALL BOWEL OBSTRUCTION DUE TO ADHESIONS (HCC): ICD-10-CM

## 2022-11-23 PROCEDURE — 1123F ACP DISCUSS/DSCN MKR DOCD: CPT | Performed by: PHYSICIAN ASSISTANT

## 2022-11-23 PROCEDURE — 99308 SBSQ NF CARE LOW MDM 20: CPT | Performed by: PHYSICIAN ASSISTANT

## 2022-11-27 ENCOUNTER — HOSPITAL ENCOUNTER (OUTPATIENT)
Age: 87
Setting detail: OBSERVATION
Discharge: NURSING, REHAB OR LONG-TERM CARE | End: 2022-11-30
Attending: INTERNAL MEDICINE | Admitting: INTERNAL MEDICINE
Payer: MEDICARE

## 2022-11-27 ENCOUNTER — APPOINTMENT (OUTPATIENT)
Dept: GENERAL RADIOLOGY | Age: 87
End: 2022-11-27
Payer: MEDICARE

## 2022-11-27 DIAGNOSIS — N39.0 URINARY TRACT INFECTION WITHOUT HEMATURIA, SITE UNSPECIFIED: ICD-10-CM

## 2022-11-27 DIAGNOSIS — E87.6 HYPOKALEMIA: ICD-10-CM

## 2022-11-27 DIAGNOSIS — I48.91 NEW ONSET ATRIAL FIBRILLATION (HCC): Primary | ICD-10-CM

## 2022-11-27 DIAGNOSIS — U07.1 COVID: ICD-10-CM

## 2022-11-27 LAB
ALBUMIN SERPL-MCNC: 3.2 G/DL (ref 3.5–4.6)
ALP BLD-CCNC: 77 U/L (ref 40–130)
ALT SERPL-CCNC: 17 U/L (ref 0–33)
ANION GAP SERPL CALCULATED.3IONS-SCNC: 12 MEQ/L (ref 9–15)
AST SERPL-CCNC: 18 U/L (ref 0–35)
BACTERIA: ABNORMAL /HPF
BASOPHILS ABSOLUTE: 0 K/UL (ref 0–0.2)
BASOPHILS RELATIVE PERCENT: 0.3 %
BILIRUB SERPL-MCNC: 0.7 MG/DL (ref 0.2–0.7)
BILIRUBIN URINE: NEGATIVE
BLOOD, URINE: ABNORMAL
BUN BLDV-MCNC: 8 MG/DL (ref 8–23)
CALCIUM SERPL-MCNC: 8.7 MG/DL (ref 8.5–9.9)
CHLORIDE BLD-SCNC: 96 MEQ/L (ref 95–107)
CLARITY: ABNORMAL
CO2: 30 MEQ/L (ref 20–31)
COLOR: YELLOW
CREAT SERPL-MCNC: 0.41 MG/DL (ref 0.5–0.9)
EOSINOPHILS ABSOLUTE: 0 K/UL (ref 0–0.7)
EOSINOPHILS RELATIVE PERCENT: 0.2 %
EPITHELIAL CELLS, UA: ABNORMAL /HPF (ref 0–5)
GFR SERPL CREATININE-BSD FRML MDRD: >60 ML/MIN/{1.73_M2}
GLOBULIN: 3 G/DL (ref 2.3–3.5)
GLUCOSE BLD-MCNC: 138 MG/DL (ref 70–99)
GLUCOSE URINE: NEGATIVE MG/DL
HCT VFR BLD CALC: 33.4 % (ref 37–47)
HEMOGLOBIN: 11.1 G/DL (ref 12–16)
HYALINE CASTS: ABNORMAL /HPF (ref 0–5)
INFLUENZA A BY PCR: NEGATIVE
INFLUENZA B BY PCR: NEGATIVE
KETONES, URINE: NEGATIVE MG/DL
LACTIC ACID: 1.8 MMOL/L (ref 0.5–2.2)
LEUKOCYTE ESTERASE, URINE: ABNORMAL
LYMPHOCYTES ABSOLUTE: 0.3 K/UL (ref 1–4.8)
LYMPHOCYTES RELATIVE PERCENT: 4.4 %
MCH RBC QN AUTO: 31.7 PG (ref 27–31.3)
MCHC RBC AUTO-ENTMCNC: 33.4 % (ref 33–37)
MCV RBC AUTO: 94.9 FL (ref 79.4–94.8)
MONOCYTES ABSOLUTE: 0.2 K/UL (ref 0.2–0.8)
MONOCYTES RELATIVE PERCENT: 3.4 %
NEUTROPHILS ABSOLUTE: 5.7 K/UL (ref 1.4–6.5)
NEUTROPHILS RELATIVE PERCENT: 91.7 %
NITRITE, URINE: NEGATIVE
PDW BLD-RTO: 14.4 % (ref 11.5–14.5)
PH UA: 7 (ref 5–9)
PLATELET # BLD: 310 K/UL (ref 130–400)
POTASSIUM SERPL-SCNC: 2.7 MEQ/L (ref 3.4–4.9)
PROCALCITONIN: 0.11 NG/ML (ref 0–0.15)
PROTEIN UA: ABNORMAL MG/DL
RBC # BLD: 3.51 M/UL (ref 4.2–5.4)
RBC UA: ABNORMAL /HPF (ref 0–5)
SARS-COV-2, NAAT: DETECTED
SODIUM BLD-SCNC: 138 MEQ/L (ref 135–144)
SPECIFIC GRAVITY UA: 1.01 (ref 1–1.03)
TOTAL PROTEIN: 6.2 G/DL (ref 6.3–8)
TROPONIN: <0.01 NG/ML (ref 0–0.01)
UROBILINOGEN, URINE: 2 E.U./DL
WBC # BLD: 6.2 K/UL (ref 4.8–10.8)
WBC UA: >100 /HPF (ref 0–5)

## 2022-11-27 PROCEDURE — 6370000000 HC RX 637 (ALT 250 FOR IP): Performed by: PHYSICIAN ASSISTANT

## 2022-11-27 PROCEDURE — 84145 PROCALCITONIN (PCT): CPT

## 2022-11-27 PROCEDURE — 6360000002 HC RX W HCPCS: Performed by: PHYSICIAN ASSISTANT

## 2022-11-27 PROCEDURE — 6370000000 HC RX 637 (ALT 250 FOR IP): Performed by: INTERNAL MEDICINE

## 2022-11-27 PROCEDURE — G0378 HOSPITAL OBSERVATION PER HR: HCPCS

## 2022-11-27 PROCEDURE — 85025 COMPLETE CBC W/AUTO DIFF WBC: CPT

## 2022-11-27 PROCEDURE — 87502 INFLUENZA DNA AMP PROBE: CPT

## 2022-11-27 PROCEDURE — 96372 THER/PROPH/DIAG INJ SC/IM: CPT

## 2022-11-27 PROCEDURE — 36415 COLL VENOUS BLD VENIPUNCTURE: CPT

## 2022-11-27 PROCEDURE — 87635 SARS-COV-2 COVID-19 AMP PRB: CPT

## 2022-11-27 PROCEDURE — 83605 ASSAY OF LACTIC ACID: CPT

## 2022-11-27 PROCEDURE — 81001 URINALYSIS AUTO W/SCOPE: CPT

## 2022-11-27 PROCEDURE — 87040 BLOOD CULTURE FOR BACTERIA: CPT

## 2022-11-27 PROCEDURE — 6360000002 HC RX W HCPCS: Performed by: INTERNAL MEDICINE

## 2022-11-27 PROCEDURE — 2580000003 HC RX 258: Performed by: PHYSICIAN ASSISTANT

## 2022-11-27 PROCEDURE — 96375 TX/PRO/DX INJ NEW DRUG ADDON: CPT

## 2022-11-27 PROCEDURE — 93005 ELECTROCARDIOGRAM TRACING: CPT | Performed by: INTERNAL MEDICINE

## 2022-11-27 PROCEDURE — 99285 EMERGENCY DEPT VISIT HI MDM: CPT

## 2022-11-27 PROCEDURE — 71046 X-RAY EXAM CHEST 2 VIEWS: CPT

## 2022-11-27 PROCEDURE — 84484 ASSAY OF TROPONIN QUANT: CPT

## 2022-11-27 PROCEDURE — 80053 COMPREHEN METABOLIC PANEL: CPT

## 2022-11-27 PROCEDURE — 96365 THER/PROPH/DIAG IV INF INIT: CPT

## 2022-11-27 RX ORDER — SODIUM CHLORIDE 9 MG/ML
INJECTION, SOLUTION INTRAVENOUS PRN
Status: DISCONTINUED | OUTPATIENT
Start: 2022-11-27 | End: 2022-11-30 | Stop reason: HOSPADM

## 2022-11-27 RX ORDER — POTASSIUM CHLORIDE 20 MEQ/1
40 TABLET, EXTENDED RELEASE ORAL PRN
Status: DISCONTINUED | OUTPATIENT
Start: 2022-11-27 | End: 2022-11-30 | Stop reason: HOSPADM

## 2022-11-27 RX ORDER — SODIUM CHLORIDE AND POTASSIUM CHLORIDE .9; .15 G/100ML; G/100ML
SOLUTION INTRAVENOUS CONTINUOUS
Status: DISCONTINUED | OUTPATIENT
Start: 2022-11-27 | End: 2022-11-28

## 2022-11-27 RX ORDER — ACETAMINOPHEN 650 MG/1
650 SUPPOSITORY RECTAL EVERY 6 HOURS PRN
Status: DISCONTINUED | OUTPATIENT
Start: 2022-11-27 | End: 2022-11-30 | Stop reason: HOSPADM

## 2022-11-27 RX ORDER — 0.9 % SODIUM CHLORIDE 0.9 %
1000 INTRAVENOUS SOLUTION INTRAVENOUS ONCE
Status: COMPLETED | OUTPATIENT
Start: 2022-11-27 | End: 2022-11-27

## 2022-11-27 RX ORDER — ONDANSETRON 2 MG/ML
4 INJECTION INTRAMUSCULAR; INTRAVENOUS EVERY 6 HOURS PRN
Status: DISCONTINUED | OUTPATIENT
Start: 2022-11-27 | End: 2022-11-30 | Stop reason: HOSPADM

## 2022-11-27 RX ORDER — POTASSIUM BICARBONATE 25 MEQ/1
50 TABLET, EFFERVESCENT ORAL ONCE
Status: COMPLETED | OUTPATIENT
Start: 2022-11-27 | End: 2022-11-27

## 2022-11-27 RX ORDER — POTASSIUM CHLORIDE 7.45 MG/ML
10 INJECTION INTRAVENOUS PRN
Status: DISCONTINUED | OUTPATIENT
Start: 2022-11-27 | End: 2022-11-30 | Stop reason: HOSPADM

## 2022-11-27 RX ORDER — ONDANSETRON 4 MG/1
4 TABLET, ORALLY DISINTEGRATING ORAL EVERY 8 HOURS PRN
Status: DISCONTINUED | OUTPATIENT
Start: 2022-11-27 | End: 2022-11-30 | Stop reason: HOSPADM

## 2022-11-27 RX ORDER — POLYETHYLENE GLYCOL 3350 17 G/17G
17 POWDER, FOR SOLUTION ORAL DAILY PRN
Status: DISCONTINUED | OUTPATIENT
Start: 2022-11-27 | End: 2022-11-29

## 2022-11-27 RX ORDER — MAGNESIUM SULFATE IN WATER 40 MG/ML
2000 INJECTION, SOLUTION INTRAVENOUS PRN
Status: DISCONTINUED | OUTPATIENT
Start: 2022-11-27 | End: 2022-11-30 | Stop reason: HOSPADM

## 2022-11-27 RX ORDER — SODIUM CHLORIDE 0.9 % (FLUSH) 0.9 %
5-40 SYRINGE (ML) INJECTION EVERY 12 HOURS SCHEDULED
Status: DISCONTINUED | OUTPATIENT
Start: 2022-11-27 | End: 2022-11-30 | Stop reason: HOSPADM

## 2022-11-27 RX ORDER — ACETAMINOPHEN 325 MG/1
650 TABLET ORAL EVERY 6 HOURS PRN
Status: DISCONTINUED | OUTPATIENT
Start: 2022-11-27 | End: 2022-11-30 | Stop reason: HOSPADM

## 2022-11-27 RX ORDER — ENOXAPARIN SODIUM 100 MG/ML
40 INJECTION SUBCUTANEOUS DAILY
Status: DISCONTINUED | OUTPATIENT
Start: 2022-11-27 | End: 2022-11-28

## 2022-11-27 RX ORDER — SODIUM CHLORIDE 0.9 % (FLUSH) 0.9 %
5-40 SYRINGE (ML) INJECTION PRN
Status: DISCONTINUED | OUTPATIENT
Start: 2022-11-27 | End: 2022-11-30 | Stop reason: HOSPADM

## 2022-11-27 RX ADMIN — METOPROLOL TARTRATE 25 MG: 25 TABLET, FILM COATED ORAL at 20:49

## 2022-11-27 RX ADMIN — ACETAMINOPHEN 650 MG: 325 TABLET ORAL at 20:49

## 2022-11-27 RX ADMIN — POTASSIUM BICARBONATE 50 MEQ: 978 TABLET, EFFERVESCENT ORAL at 15:56

## 2022-11-27 RX ADMIN — POTASSIUM CHLORIDE AND SODIUM CHLORIDE: 900; 150 INJECTION, SOLUTION INTRAVENOUS at 18:38

## 2022-11-27 RX ADMIN — SODIUM CHLORIDE 1000 ML: 9 INJECTION, SOLUTION INTRAVENOUS at 14:41

## 2022-11-27 RX ADMIN — CEFTRIAXONE SODIUM 1000 MG: 1 INJECTION, POWDER, FOR SOLUTION INTRAMUSCULAR; INTRAVENOUS at 18:01

## 2022-11-27 RX ADMIN — ENOXAPARIN SODIUM 40 MG: 100 INJECTION SUBCUTANEOUS at 20:49

## 2022-11-27 ASSESSMENT — PAIN - FUNCTIONAL ASSESSMENT: PAIN_FUNCTIONAL_ASSESSMENT: NONE - DENIES PAIN

## 2022-11-27 ASSESSMENT — ENCOUNTER SYMPTOMS
ABDOMINAL DISTENTION: 0
RHINORRHEA: 0
SORE THROAT: 0
CONSTIPATION: 0
EYE DISCHARGE: 0
COLOR CHANGE: 0
ABDOMINAL PAIN: 0
SHORTNESS OF BREATH: 0

## 2022-11-27 ASSESSMENT — LIFESTYLE VARIABLES
HOW MANY STANDARD DRINKS CONTAINING ALCOHOL DO YOU HAVE ON A TYPICAL DAY: PATIENT DOES NOT DRINK
HOW OFTEN DO YOU HAVE A DRINK CONTAINING ALCOHOL: NEVER

## 2022-11-27 NOTE — ED NOTES
Report called to [facility] about patient being admitted.       Darryn Fernandez RN  11/27/22 1991

## 2022-11-27 NOTE — CARE COORDINATION
Met with pt and  at bedside. She is from [facility] and wants to return there,  states \"they are holding her room\". I also clarified with new ACP note emergency contacts.  Electronically signed by Kashif Carvajal RN on 11/27/2022 at 6:44 PM

## 2022-11-27 NOTE — ED PROVIDER NOTES
remainder of the review of systems was reviewed and negative. PAST MEDICAL HISTORY     Past Medical History:   Diagnosis Date    Cancer ([facility] [facility] 1999    bladder    Hyperlipidemia     Hypertension          SURGICALHISTORY       Past Surgical History:   Procedure Laterality Date    ABDOMEN SURGERY      large intestine partial removal    APPENDECTOMY      CHOLECYSTECTOMY      HYSTERECTOMY (CERVIX STATUS UNKNOWN)      LAPAROTOMY N/A 11/7/2022    EXPLORATORY LAPAROTOMY LYSIS OF ADHESIONS performed by Toshia Singleton MD at [facility]       Previous Medications    ACETAMINOPHEN (TYLENOL) 500 MG TABLET    Take 500 mg by mouth as needed for Pain    AMLODIPINE (NORVASC) 2.5 MG TABLET    Take 2.5 mg by mouth daily    ASPIRIN 81 MG EC TABLET    Take 81 mg by mouth daily    CHOLECALCIFEROL 2000 UNITS CAPS    Take one tab PO QD    DICLOFENAC SODIUM (VOLTAREN) 1 % GEL    Apply 2 g topically 4 times daily    DOCUSATE SODIUM (COLACE, DULCOLAX) 100 MG CAPS    Take 100 mg by mouth daily    GABAPENTIN (NEURONTIN) 100 MG CAPSULE    Take 200 mg by mouth every evening.  Patient states she is supposed to take 100 mg three times a day, but will only take 200 mg at bed time    MELATONIN 10 MG CAPS CAPSULE    Take 10 mg by mouth daily    METOPROLOL TARTRATE (LOPRESSOR) 25 MG TABLET    Take 25 mg by mouth 2 times daily    MUPIROCIN (BACTROBAN) 2 % OINTMENT    apply to the affected area of the left nasal passage three times daily for 10 days as directed    NITROGLYCERIN (NITROSTAT) 0.4 MG SL TABLET    Place 0.4 mg under the tongue    ONDANSETRON (ZOFRAN ODT) 4 MG DISINTEGRATING TABLET    Take 1 tablet by mouth every 8 hours as needed for Nausea    POLYETHYLENE GLYCOL (GLYCOLAX) 17 G PACKET    Take 17 g by mouth daily as needed for Constipation    PRAVASTATIN (PRAVACHOL) 40 MG TABLET    Take 40 mg by mouth daily       ALLERGIES     Phenazopyridine    FAMILY HISTORY       Family History   Problem Relation Age of Onset    Coronary Art Dis Brother           SOCIAL HISTORY       Social History     Socioeconomic History    Marital status:    Tobacco Use    Smoking status: Never    Smokeless tobacco: Never   Substance and Sexual Activity    Alcohol use: No    Drug use: No       SCREENINGS    Lavinia Coma Scale  Eye Opening: Spontaneous  Best Verbal Response: Oriented  Best Motor Response: Obeys commands  Lavinia Coma Scale Score: 15 @FLOW(14851285)@      PHYSICAL EXAM    (up to 7 for level 4, 8 or more for level 5)     ED Triage Vitals [11/27/22 1334]   BP Temp Temp Source Heart Rate Resp SpO2 Height Weight   132/66 98.9 °F (37.2 °C) Oral (!) 101 18 97 % 5' 1\" (1.549 m) 134 lb (60.8 kg)       Physical Exam  Vitals and nursing note reviewed. Constitutional:       General: She is not in acute distress. Appearance: She is well-developed. She is not ill-appearing, toxic-appearing or diaphoretic. HENT:      Head: Normocephalic. Right Ear: Tympanic membrane normal.      Left Ear: Tympanic membrane normal.      Nose: Nose normal. No congestion. Mouth/Throat:      Mouth: Mucous membranes are moist.      Pharynx: No oropharyngeal exudate or posterior oropharyngeal erythema. Eyes:      Extraocular Movements: Extraocular movements intact. Conjunctiva/sclera: Conjunctivae normal.      Pupils: Pupils are equal, round, and reactive to light. Neck:      Vascular: No JVD. Trachea: No tracheal deviation. Cardiovascular:      Rate and Rhythm: Normal rate. Pulses: Normal pulses. Heart sounds: Normal heart sounds. No murmur heard. No friction rub. No gallop. Pulmonary:      Effort: Pulmonary effort is normal. No tachypnea, accessory muscle usage, respiratory distress or retractions. Breath sounds: Normal breath sounds. No stridor. No wheezing, rhonchi or rales.       Comments: Lung sounds are clear in all fields, there is no wheezes rales or rhonchi, no accessory muscle use, retractions, room air saturations are 97%  Chest:      Chest wall: No tenderness. Abdominal:      General: Abdomen is flat. Bowel sounds are normal. There is no distension or abdominal bruit. Palpations: There is no shifting dullness, fluid wave, hepatomegaly, splenomegaly, mass or pulsatile mass. Tenderness: There is no abdominal tenderness. There is no right CVA tenderness, left CVA tenderness, guarding or rebound. Negative signs include Womack's sign, Rovsing's sign and McBurney's sign. Comments: Abdomen soft nondistended nontender no guarding mass or rebound. Patient has recent surgical scarring to abdomen from recent bowel obstruction bowel sounds are present in all 4 quadrants. Musculoskeletal:         General: No deformity. Cervical back: Normal range of motion and neck supple. No rigidity. Skin:     General: Skin is warm and dry. Capillary Refill: Capillary refill takes less than 2 seconds. Coloration: Skin is not jaundiced. Neurological:      General: No focal deficit present. Mental Status: She is alert and oriented to person, place, and time. Mental status is at baseline. Cranial Nerves: No cranial nerve deficit. Sensory: No sensory deficit. Motor: No weakness. Coordination: Coordination normal.   Psychiatric:         Mood and Affect: Mood normal.       DIAGNOSTIC RESULTS     EKG: All EKG's are interpreted by the Emergency Department Physician who either signs or Co-signsthis chart in the absence of a cardiologist.    EKG shows atrial fibrillation at 93 bpm with occasional premature ventricular and aberrant conduction complexes. There is T wave versions in leads I to III, aVF, V2, V3 V4 V5 and V6  ms.     RADIOLOGY:   Non-plain filmimages such as CT, Ultrasound and MRI are read by the radiologist. Plain radiographic images are visualized and preliminarily interpreted by the emergency physician with the below findings:        Interpretation per the Radiologist below, if available at the time ofthis note:    XR CHEST (2 VW)   Final Result   Cardiomegaly and small bilateral pleural effusions. ED BEDSIDE ULTRASOUND:   Performed by ED Physician - none    LABS:  Labs Reviewed   COVID-19, RAPID - Abnormal; Notable for the following components:       Result Value    SARS-CoV-2, NAAT DETECTED (*)     All other components within normal limits   CBC WITH AUTO DIFFERENTIAL - Abnormal; Notable for the following components:    RBC 3.51 (*)     Hemoglobin 11.1 (*)     Hematocrit 33.4 (*)     MCV 94.9 (*)     MCH 31.7 (*)     Lymphocytes Absolute 0.3 (*)     All other components within normal limits   COMPREHENSIVE METABOLIC PANEL - Abnormal; Notable for the following components:    Potassium 2.7 (*)     Glucose 138 (*)     Creatinine 0.41 (*)     Total Protein 6.2 (*)     Albumin 3.2 (*)     All other components within normal limits    Narrative:     CALL  [facility]  [facility] tel. [phone],  Potassium results called to and read back by Mikhail Lucero, 11/27/2022 15:38,  by Trinity Sanders - Abnormal; Notable for the following components:    Clarity, UA CLOUDY (*)     Blood, Urine TRACE (*)     Protein, UA TRACE (*)     Urobilinogen, Urine 2.0 (*)     Leukocyte Esterase, Urine LARGE (*)     All other components within normal limits   MICROSCOPIC URINALYSIS - Abnormal; Notable for the following components:    Bacteria, UA MANY (*)     WBC, UA >100 (*)     RBC, UA 3-5 (*)     All other components within normal limits   RAPID INFLUENZA A/B ANTIGENS   CULTURE, BLOOD 1   CULTURE, BLOOD 1   LACTIC ACID   TROPONIN   PROCALCITONIN       All other labs were within normal range or not returned as of this dictation.     EMERGENCY DEPARTMENT COURSE and DIFFERENTIAL DIAGNOSIS/MDM:   Vitals:    Vitals:    11/27/22 1334 11/27/22 1405 11/27/22 1606   BP: 132/66 (!) 129/56    Pulse: (!) 101  89   Resp: 18  15   Temp: 98.9 °F (37.2 °C)     TempSrc: Oral     SpO2: 97% 97%    Weight: 134 lb (60.8 kg)     Height: 5' 1\" (1.549 m)              MDM  Number of Diagnoses or Management Options  COVID  Hypokalemia  New onset atrial fibrillation (HCC)  Urinary tract infection without hematuria, site unspecified  Diagnosis management comments: Artemio Cox presented to the emerged part with complaint of weakness, tremors, and shaking when she got up this morning and tried to ambulate to the bathroom, she felt as if she was going to pass out. She was sent to the emergency department for further evaluation. On arrival she looks well, nontoxic appearance. She is moving all extremities well, NIH stroke scale is 0 at this time. Denies any cough or fevers. COVID study was completed and is positive today. Patient is in A. fib which is acutely new for her as well, she denies any chest pain or shortness of breath at this time. Potassium level is 2.7, she was given oral potassium and IV infusion. She also shows signs for urinary tract infection grow greater than 100 white blood cells within her urine. I did speak with the [facility] hospitalist Dr. Gopal Welch, she will accept admission this patient with consults to cardiology for new onset A. fib. CRITICAL CARE TIME   Total Critical Care time was 0 minutes, excluding separately reportableprocedures. There was a high probability of clinicallysignificant/life threatening deterioration in the patient's condition which required my urgent intervention. CONSULTS:  None    PROCEDURES:  Unless otherwise noted below, none     Procedures    FINAL IMPRESSION      1. New onset atrial fibrillation ([address]    2. Hypokalemia    3. COVID    4. Urinary tract infection without hematuria, site unspecified          DISPOSITION/PLAN   DISPOSITION Decision To Admit 11/27/2022 05:29:10 PM      PATIENT REFERRED TO:  No follow-up provider specified.     DISCHARGE MEDICATIONS:  New Prescriptions    No medications on file          (Please note that portions of this note were completed with a voice recognition program.  Efforts were made to edit the dictations but occasionally words are mis-transcribed.)    Donna Curtis PA-C (electronically signed)  Attending Emergency Physician         Donna Curtis PA-C  11/27/22 2887

## 2022-11-27 NOTE — H&P
Hospital Medicine  History and Physical    Patient:  Rubens Rodriguez  MRN: [id number]    CHIEF COMPLAINT:    Chief Complaint   Patient presents with    Chills       History Obtained From:  Patient, EMR  Primary Care Physician: Chucho Hou PA-C    HISTORY OF PRESENT ILLNESS:   The patient is a 80 y.o. female with PMH of hypertension, hyperlipidemia, paroxysmal supraventricular tachycardia, abnormal EKG, high-grade bowel obstruction status post laparotomy and lysis of adhesions 11/2022 who presents with rigors, and generalized weakness. Patient states she had severe constipation after recent discharge from the hospital.  Today she received milk of mag and enema and additional unspecified bowel regimen and had multiple bowel movements today. After using the bathroom she reported developing rigors. She denies dysuria, frequency-but states she is not sure because she wears pure wick, denies suprapubic pain. Patient also denies chest pain, shortness of breath, palpitations, nausea, vomiting. Patient is afebrile. Initially tachycardic heart rate 101. No leukocytosis. Metabolic profile is notable for potassium of 2.7. COVID-positive-not new was positive on discharge earlier this month. Patient is on room air. Past Medical History:      Diagnosis Date    Cancer [facility]) 1999    bladder    Hyperlipidemia     Hypertension        Past Surgical History:      Procedure Laterality Date    ABDOMEN SURGERY      large intestine partial removal    APPENDECTOMY      CHOLECYSTECTOMY      HYSTERECTOMY (CERVIX STATUS UNKNOWN)      LAPAROTOMY N/A 11/7/2022    EXPLORATORY LAPAROTOMY LYSIS OF ADHESIONS performed by Galina Macedo MD at [facility]       Medications Prior to Admission:    Prior to Admission medications    Medication Sig Start Date End Date Taking?  Authorizing Provider   docusate sodium (COLACE, DULCOLAX) 100 MG CAPS Take 100 mg by mouth daily 11/12/22   Everala Faisal, DO   polyethylene glycol (GLYCOLAX) 17 g packet Take 17 g by mouth daily as needed for Constipation 11/11/22 12/11/22  Carli Gifford, DO   aspirin 81 MG EC tablet Take 81 mg by mouth daily 12/10/19   Historical Provider, MD   amLODIPine (NORVASC) 2.5 MG tablet Take 2.5 mg by mouth daily 8/1/22   Historical Provider, MD   diclofenac sodium (VOLTAREN) 1 % GEL Apply 2 g topically 4 times daily 6/24/19   Historical Provider, MD   melatonin 10 MG CAPS capsule Take 10 mg by mouth daily    Historical Provider, MD   mupirocin (BACTROBAN) 2 % ointment apply to the affected area of the left nasal passage three times daily for 10 days as directed 9/16/22   Historical Provider, MD   nitroGLYCERIN (NITROSTAT) 0.4 MG SL tablet Place 0.4 mg under the tongue 7/6/22   Historical Provider, MD   ondansetron (ZOFRAN ODT) 4 MG disintegrating tablet Take 1 tablet by mouth every 8 hours as needed for Nausea 10/13/20   Patricia Boswell,    Cholecalciferol 2000 units CAPS Take one tab PO QD 8/6/18   Historical Provider, MD   gabapentin (NEURONTIN) 100 MG capsule Take 200 mg by mouth every evening. Patient states she is supposed to take 100 mg three times a day, but will only take 200 mg at bed time 1/2/19   Historical Provider, MD   metoprolol tartrate (LOPRESSOR) 25 MG tablet Take 25 mg by mouth 2 times daily    Historical Provider, MD   pravastatin (PRAVACHOL) 40 MG tablet Take 40 mg by mouth daily    Historical Provider, MD   acetaminophen (TYLENOL) 500 MG tablet Take 500 mg by mouth as needed for Pain    Historical Provider, MD       Allergies:  Phenazopyridine    Social History:   TOBACCO:   reports that she has never smoked. She has never used smokeless tobacco.  ETOH:   reports no history of alcohol use.       Family History:       Problem Relation Age of Onset    Coronary Art Dis Brother        REVIEW OF SYSTEMS:  12 systems reviewed and negative except for stated in HPI    Physical Exam:    Vitals: BP (!) 129/56   Pulse 89   Temp 98.9 °F (37.2 °C) (Oral)   Resp 15 Ht 5' 1\" (1.549 m)   Wt 134 lb (60.8 kg)   SpO2 97%   BMI 25.32 kg/m²   General appearance: alert, appears stated age and cooperative,  Skin: Skin color, texture, turgor normal. No rashes or lesions  HEENT: Head: Normocephalic, no lesions, without obvious abnormality. Neck: no adenopathy, no carotid bruit, no JVD, supple, symmetrical, trachea midline, and thyroid not enlarged, symmetric, no tenderness/mass/nodules  Lungs: clear to auscultation bilaterally  Heart: Normal S1-S2,  Abdomen: Surgical site is clean and dry, appears well-healed, there is small area of erythema in the right upper abdomen from closure device, no drainage or reported tenderness, no suprapubic tenderness on exam, bowel sounds are present throughout  Extremities: 1-2+ pitting edema bilaterally  Neurologic: Mental status: Alert, oriented, thought content appropriate     Recent Labs     11/27/22  1440   WBC 6.2   HGB 11.1*        Recent Labs     11/27/22  1439      K 2.7*   CL 96   CO2 30   BUN 8   CREATININE 0.41*   GLUCOSE 138*   AST 18   ALT 17   BILITOT 0.7   ALKPHOS 77     Troponin T:   Recent Labs     11/27/22  1439   TROPONINI <0.010       ABGs: No results found for: PHART, PO2ART, RFY3GDL  INR: No results for input(s): INR in the last 72 hours. URINALYSIS:  Recent Labs     11/27/22  1439   COLORU Yellow   PHUR 7.0   WBCUA >100*   RBCUA 3-5*   BACTERIA MANY*   CLARITYU CLOUDY*   SPECGRAV 1.011   LEUKOCYTESUR LARGE*   UROBILINOGEN 2.0*   BILIRUBINUR Negative   BLOODU TRACE*   GLUCOSEU Negative     -----------------------------------------------------------------   XR CHEST (2 VW)    Result Date: 11/27/2022  EXAMINATION: TWO XRAY VIEWS OF THE CHEST 11/27/2022 1:52 pm COMPARISON: 03/22/2010. HISTORY: ORDERING SYSTEM PROVIDED HISTORY: chills TECHNOLOGIST PROVIDED HISTORY: Reason for exam:->chills What reading provider will be dictating this exam?->CRC FINDINGS: The cardiomediastinal silhouette is without acute process. Biapical prominence suggestive COPD change haziness overlying the lower lung fields consistent with bilateral pleural effusions. The bronchovascular and interstitial lung markings otherwise unremarkable. No evidence of infiltrate or consolidation. There is no pneumothorax. The osseous structures are without acute process. Cardiomegaly and small bilateral pleural effusions. XR CHEST (2 VW)   Final Result   Cardiomegaly and small bilateral pleural effusions. Assessment and Plan     Inability to ambulate/Generalized weakness  UTI: Uncomplicated, no fever or leukocytosis, Rocephin, Follow up urine culture  Hypokalemia: In setting of cathartic bowel regimen today which is likely cause. Replete, check mag  Arrhythmia: ? Of Afib vs premature. History of Abnormal EKG, Telemetry, EKG in am, cardiology consult. Replete electrolytes with goal potassium greater than 4, mag greater than 2  COVID-19 +: Not new, positive 11/11/22, no o2 requirement  History of SBO, s/p laparotomy and lysis of adhesions 11/7/22      H/o paroxysmal supraventricular tachycardia, abnormal EKG, hypertrophic cardiomyopathy    Home medications to be reordered once rec is completed  CODE STATUS confirmed as full  Plan of care discussed with  at bedside, all questions answered    Diet: No diet orders on file  Code Status  Disposition: Dependent on hospital course. Will discharge once medically stable. SW on board for discharge planning.      Patient Active Problem List   Diagnosis Code    Nausea vomiting and diarrhea R11.2, R19.7    SBO (small bowel obstruction) ([facility] [address] K56.609    Atypical chest pain R07.89    History of hypertrophic cardiomyopathy Z86.79    History of PSVT (paroxysmal supraventricular tachycardia) Z86.79    Abnormal EKG R94.31    Hypertension I10       Lexis Almanzar, DO, DO  Admitting Hospitalist    TTS: 85mins where I focused more than 75% of my attention on rendering care, and planning treatment course for this patient, in addition to talking to RN team, mid levels, consulting with other physicians and following up on labs and imaging.

## 2022-11-27 NOTE — ED TRIAGE NOTES
Pt arrived via EMS from nursing home with c/o chills. Pt states she was using the restroom when she started shaking uncontrollably. Pt denies any other symptoms, breathing, pulses, and circulation are all good. Pulses are present but weak.

## 2022-11-28 LAB
ANION GAP SERPL CALCULATED.3IONS-SCNC: 13 MEQ/L (ref 9–15)
BUN BLDV-MCNC: 7 MG/DL (ref 8–23)
CALCIUM SERPL-MCNC: 7.6 MG/DL (ref 8.5–9.9)
CHLORIDE BLD-SCNC: 105 MEQ/L (ref 95–107)
CO2: 18 MEQ/L (ref 20–31)
CREAT SERPL-MCNC: 0.23 MG/DL (ref 0.5–0.9)
EKG ATRIAL RATE: 105 BPM
EKG Q-T INTERVAL: 382 MS
EKG QRS DURATION: 90 MS
EKG QTC CALCULATION (BAZETT): 474 MS
EKG R AXIS: 36 DEGREES
EKG T AXIS: 224 DEGREES
EKG VENTRICULAR RATE: 93 BPM
GFR SERPL CREATININE-BSD FRML MDRD: >60 ML/MIN/{1.73_M2}
GLUCOSE BLD-MCNC: 82 MG/DL (ref 70–99)
MAGNESIUM: 1.9 MG/DL (ref 1.7–2.4)
POTASSIUM REFLEX MAGNESIUM: 4.6 MEQ/L (ref 3.4–4.9)
POTASSIUM SERPL-SCNC: 3.5 MEQ/L (ref 3.4–4.9)
SODIUM BLD-SCNC: 136 MEQ/L (ref 135–144)

## 2022-11-28 PROCEDURE — 80048 BASIC METABOLIC PNL TOTAL CA: CPT

## 2022-11-28 PROCEDURE — 83735 ASSAY OF MAGNESIUM: CPT

## 2022-11-28 PROCEDURE — 6360000002 HC RX W HCPCS: Performed by: PHYSICIAN ASSISTANT

## 2022-11-28 PROCEDURE — 84132 ASSAY OF SERUM POTASSIUM: CPT

## 2022-11-28 PROCEDURE — 96366 THER/PROPH/DIAG IV INF ADDON: CPT

## 2022-11-28 PROCEDURE — APPSS45 APP SPLIT SHARED TIME 31-45 MINUTES: Performed by: PHYSICIAN ASSISTANT

## 2022-11-28 PROCEDURE — 36415 COLL VENOUS BLD VENIPUNCTURE: CPT

## 2022-11-28 PROCEDURE — 2580000003 HC RX 258: Performed by: INTERNAL MEDICINE

## 2022-11-28 PROCEDURE — 6370000000 HC RX 637 (ALT 250 FOR IP): Performed by: INTERNAL MEDICINE

## 2022-11-28 PROCEDURE — G0378 HOSPITAL OBSERVATION PER HR: HCPCS

## 2022-11-28 PROCEDURE — 93010 ELECTROCARDIOGRAM REPORT: CPT | Performed by: INTERNAL MEDICINE

## 2022-11-28 PROCEDURE — 87086 URINE CULTURE/COLONY COUNT: CPT

## 2022-11-28 PROCEDURE — 6370000000 HC RX 637 (ALT 250 FOR IP): Performed by: NURSE PRACTITIONER

## 2022-11-28 PROCEDURE — 96372 THER/PROPH/DIAG INJ SC/IM: CPT

## 2022-11-28 PROCEDURE — 6360000002 HC RX W HCPCS: Performed by: INTERNAL MEDICINE

## 2022-11-28 RX ORDER — ENOXAPARIN SODIUM 100 MG/ML
1 INJECTION SUBCUTANEOUS 2 TIMES DAILY
Status: DISCONTINUED | OUTPATIENT
Start: 2022-11-28 | End: 2022-11-29

## 2022-11-28 RX ORDER — FUROSEMIDE 10 MG/ML
20 INJECTION INTRAMUSCULAR; INTRAVENOUS ONCE
Status: COMPLETED | OUTPATIENT
Start: 2022-11-28 | End: 2022-11-28

## 2022-11-28 RX ORDER — MECOBALAMIN 5000 MCG
10 TABLET,DISINTEGRATING ORAL NIGHTLY
Status: DISCONTINUED | OUTPATIENT
Start: 2022-11-28 | End: 2022-11-30 | Stop reason: HOSPADM

## 2022-11-28 RX ADMIN — METOPROLOL TARTRATE 25 MG: 25 TABLET, FILM COATED ORAL at 20:54

## 2022-11-28 RX ADMIN — SODIUM CHLORIDE, PRESERVATIVE FREE 10 ML: 5 INJECTION INTRAVENOUS at 10:56

## 2022-11-28 RX ADMIN — METOPROLOL TARTRATE 25 MG: 25 TABLET, FILM COATED ORAL at 10:56

## 2022-11-28 RX ADMIN — FUROSEMIDE 20 MG: 10 INJECTION, SOLUTION INTRAMUSCULAR; INTRAVENOUS at 11:48

## 2022-11-28 RX ADMIN — SODIUM CHLORIDE, PRESERVATIVE FREE 10 ML: 5 INJECTION INTRAVENOUS at 20:54

## 2022-11-28 RX ADMIN — Medication 10 MG: at 22:32

## 2022-11-28 RX ADMIN — CEFTRIAXONE SODIUM 1000 MG: 1 INJECTION, POWDER, FOR SOLUTION INTRAMUSCULAR; INTRAVENOUS at 20:00

## 2022-11-28 RX ADMIN — ENOXAPARIN SODIUM 60 MG: 100 INJECTION SUBCUTANEOUS at 20:54

## 2022-11-28 ASSESSMENT — ENCOUNTER SYMPTOMS
CHEST TIGHTNESS: 0
NAUSEA: 1
ABDOMINAL PAIN: 0
COLOR CHANGE: 0
VOMITING: 0
SHORTNESS OF BREATH: 0

## 2022-11-28 NOTE — CARE COORDINATION
SPOKE WITH CAROLE. PT WILL NEED PRECERT TO RETURN TO [facility]. PT/OT EVALS REQUESTED. PT MAY RETURN TO [facility] COVID +.

## 2022-11-28 NOTE — PROGRESS NOTES
Patient arrived to the unit. Denies pain or SOB . Admission completed per flow sheet , Two RN check with FENG Marie Iv fluids running .  PRN for pain given

## 2022-11-28 NOTE — PROGRESS NOTES
Hospitalist Progress Note      PCP: Gianna Awan PA-C    Date of Admission: 11/27/2022    Chief Complaint:      Subjective: :  Pt seen and examined. K improved to 4.6. She denies rigors/chills or urinary symptoms. No CP/SOB  Cardiology recs noted and appreciated.          Medications:  Reviewed    Infusion Medications    sodium chloride       Scheduled Medications    enoxaparin  1 mg/kg SubCUTAneous BID    sodium chloride flush  5-40 mL IntraVENous 2 times per day    cefTRIAXone (ROCEPHIN) IV  1,000 mg IntraVENous Q24H    metoprolol tartrate  25 mg Oral BID     PRN Meds: sodium chloride flush, sodium chloride, ondansetron **OR** ondansetron, polyethylene glycol, acetaminophen **OR** acetaminophen, potassium chloride **OR** potassium alternative oral replacement **OR** potassium chloride, magnesium sulfate      Intake/Output Summary (Last 24 hours) at 11/28/2022 1220  Last data filed at 11/28/2022 0517  Gross per 24 hour   Intake --   Output 500 ml   Net -500 ml       Exam:    BP (!) 130/57   Pulse 92   Temp 97.2 °F (36.2 °C) (Oral)   Resp 18   Ht 5' 2\" (1.575 m)   Wt 134 lb (60.8 kg)   SpO2 99%   BMI 24.51 kg/m²     General appearance: alert, appears stated age and cooperative,  Skin: Skin color, texture, turgor normal. No rashes or lesions  HEENT: Head: Normocephalic  Neck: supple, symmetrical, trachea midline  Lungs: clear to auscultation bilaterally  Heart: Irregular irregular  Abdomen: Surgical site is clean and dry, appears well-healed, there is small area of erythema in the right upper abdomen from closure device, no drainage or reported tenderness, no suprapubic tenderness on exam, bowel sounds are present throughout  Extremities: 1-2+ pitting edema bilaterally  Neurologic: Mental status: Alert, oriented, thought content appropriate       Labs:   Recent Labs     11/27/22  1440   WBC 6.2   HGB 11.1*   HCT 33.4*        Recent Labs     11/27/22  1439 11/28/22  0113 11/28/22  0645     -- 136   K 2.7* 3.5 4.6   CL 96  --  105   CO2 30  --  18*   BUN 8  --  7*   CREATININE 0.41*  --  0.23*   CALCIUM 8.7  --  7.6*     Recent Labs     11/27/22  1439   AST 18   ALT 17   BILITOT 0.7   ALKPHOS 77     No results for input(s): INR in the last 72 hours. Recent Labs     11/27/22  1439   TROPONINI <0.010       Urinalysis:      Lab Results   Component Value Date/Time    NITRU Negative 11/27/2022 02:39 PM    [address] >100 11/27/2022 02:39 PM    BACTERIA MANY 11/27/2022 02:39 PM    RBCUA 3-5 11/27/2022 02:39 PM    BLOODU TRACE 11/27/2022 02:39 PM    SPECGRAV 1.011 11/27/2022 02:39 PM    GLUCOSEU Negative 11/27/2022 02:39 PM       Radiology:  XR CHEST (2 VW)   Final Result   Cardiomegaly and small bilateral pleural effusions. Assessment/Plan:    Active Hospital Problems    Diagnosis Date Noted    UTI (urinary tract infection) [N39.0] 11/27/2022     Priority: Medium      Inability to ambulate/Generalized weakness: 2/2 to new onset afib, electrolyte derangements, UTI  UTI: Uncomplicated, no fever or leukocytosis, Rocephin. Culture pending. Hypokalemia: Improved. New onset Afib: Cardiology recommendations appreciated,, Will DC on NOAC, continue Lopressor. LE edema and pleural effusion: Lasix X1 today  COVID-19 +: Not new, positive 11/11/22, no o2 requirement  History of SBO, s/p laparotomy and lysis of adhesions 11/7/22        H/o paroxysmal supraventricular tachycardia, abnormal EKG, hypertrophic cardiomyopathy     Home medications to be reordered once rec is completed  CODE STATUS confirmed as full  Plan of care discussed with  at bedside, all questions answered      Additional work up or/and treatment plan may be added today or then after based on clinical progression. I am managing a portion of pt care. Some medical issues are handled by other specialists. Additional work up and treatment should be done in out pt setting by pt PCP and other out pt providers.          DVT prophylaxis:On therapeutic Lovenox    Diet: ADULT DIET;  Regular    PT/OT Eval     Code Status: Full Code    Kaylynn Kern DO                Electronically signed by Kaylynn Kern DO on 11/28/2022 at 12:20 PM

## 2022-11-28 NOTE — CONSULTS
Consult Note  Patient: Simón Rashid  Unit/Bed: N882/D613-23  YOB: 1935  MRN: [id number]  Acct: [de-identified]   Admitting Diagnosis: Hypokalemia [E87.6]  UTI (urinary tract infection) [N39.0]  New onset atrial fibrillation ([facility] [address] [I48.91]  Urinary tract infection without hematuria, site unspecified [N39.0]  COVID [U07.1]  Date:  11/27/2022  Hospital Day: 0      Chief Complaint:  Chills    History of Present Illness: This is a very pleasant 44-year-old  female with past medical history significant for PSVT, history of hypertrophic cardiomyopathy per prior testing at [facility], recent small bowel obstruction status post exploratory laparotomy with lysis of adhesions on 11/7/2022, recent COVID-19 infection diagnosed on 11/11/2022, history of hypertension and dyslipidemia who presented to [facility] ER yesterday with chief complaint of chills. Patient has been residing at [facility] for rehab following recent hospitalization and apparently after going to the bathroom yesterday she developed shaking chills and was brought to the ER for further evaluation. She denied any associated chest pain, shortness of breath, palpitations, abdominal pain, vomiting, dizziness, syncope or fever. On presentation to the emergency room, blood pressure 132/66, heart rate 101, respiratory rate 18, pulse ox 97%, temperature 98.9 °F.  Sodium 138, potassium low at 2.7, chloride 96, total CO2 30, BUN 8, creatinine 0.41, GFR greater than 60, glucose 138. Troponin negative less than 0.010. WBC 6.2, hemoglobin 11.1, hematocrit 33.4, platelets 624. Rapid influenza A and influenza B negative. Urinalysis consistent with UTI. Repeat COVID testing again positive. Chest x-ray revealed cardiomegaly and small bilateral pleural effusions. EKG showed A. fib at 93 bpm and no acute ischemic changes. She was treated with IV potassium replacement as well as oral replacement and admitted for further evaluation.     Cardiology has been consulted regarding new onset atrial fibrillation. Currently, patient is in [facility] as she again tested positive yesterday. On telemetry, she is A. fib with heart rate 70s to 80s. Telemetry alarms reviewed showed 1.5-second pause at 2341 yesterday evening. She is currently on rate control with Lopressor 25 mg p.o. twice daily. She has no known prior history of A. fib but does have a history of PSVT. She follows with [facility] cardiology, Dr. Chalino Boateng. She denies chest pain, palpitations, shortness of breath, orthopnea, or PND. She does have bilateral lower extremity edema. Most recent echocardiogram 11/7/2022 revealed normal LV systolic function with EF of 65%, no significant valvular heart disease, normal RVSP. Patient status post stress test 8/26/2022 at [facility] which was negative for ischemia.     Allergies   Allergen Reactions    Phenazopyridine Nausea And Vomiting     Other reaction(s): GI Upset       Current Facility-Administered Medications   Medication Dose Route Frequency Provider Last Rate Last Admin    enoxaparin (LOVENOX) injection 60 mg  1 mg/kg SubCUTAneous BID [address] [place], [place]        furosemide (LASIX) injection 20 mg  20 mg IntraVENous Once [address] [facility], [address] [place] [address]        sodium chloride flush 0.9 % injection 5-40 mL  5-40 mL IntraVENous 2 times per day Lylia Marylou, DO   10 mL at 11/28/22 1056    sodium chloride flush 0.9 % injection 5-40 mL  5-40 mL IntraVENous PRN Lylia Marylou, DO        0.9 % sodium chloride infusion   IntraVENous PRN Lylia Marylou, DO        ondansetron (ZOFRAN-ODT) disintegrating tablet 4 mg  4 mg Oral Q8H PRN Lylia Marylou, DO        Or    ondansetron (ZOFRAN) injection 4 mg  4 mg IntraVENous Q6H PRN Lylia Marylou, DO        polyethylene glycol (GLYCOLAX) packet 17 g  17 g Oral Daily PRN Lylia Marylou, DO        acetaminophen (TYLENOL) tablet 650 mg  650 mg Oral Q6H PRN Lylia Marylou, DO   650 mg at 11/27/22 2049    Or    acetaminophen (TYLENOL) suppository 650 mg  650 mg Rectal Q6H PRN Burnard Sandhoff, DO        cefTRIAXone (ROCEPHIN) 1,000 mg in dextrose 5 % 50 mL IVPB mini-bag  1,000 mg IntraVENous Q24H Burnard Sandhoff, DO        potassium chloride (KLOR-CON M) extended release tablet 40 mEq  40 mEq Oral PRN Burnard Sandhoff, DO        Or    potassium bicarb-citric acid (EFFER-K) effervescent tablet 40 mEq  40 mEq Oral PRN Burnard Sandhoff, DO        Or    potassium chloride 10 mEq/100 mL IVPB (Peripheral Line)  10 mEq IntraVENous PRN Burnard Sandhoff, DO        magnesium sulfate 2000 mg in 50 mL IVPB premix  2,000 mg IntraVENous PRN Burnard Sandhoff, DO        metoprolol tartrate (LOPRESSOR) tablet 25 mg  25 mg Oral BID Burnard Sandhoff, DO   25 mg at 11/28/22 1056       PMHx:  Past Medical History:   Diagnosis Date    Cancer ([facility] [address] 1999    bladder    Hyperlipidemia     Hypertension        PSHx:  Past Surgical History:   Procedure Laterality Date    ABDOMEN SURGERY      large intestine partial removal    APPENDECTOMY      CHOLECYSTECTOMY      HYSTERECTOMY (CERVIX STATUS UNKNOWN)      LAPAROTOMY N/A 11/7/2022    EXPLORATORY LAPAROTOMY LYSIS OF ADHESIONS [facility] Hx:  Social History     Socioeconomic History    Marital status:    Tobacco Use    Smoking status: Never    Smokeless tobacco: Never   Substance and Sexual Activity    Alcohol use: No    Drug use: No       Family Hx:  Family History   Problem Relation Age of Onset    Coronary Art Dis Brother        Review of Systems:   Review of Systems   Constitutional:  Negative for activity change, fatigue and fever. HENT:  Negative for congestion. Respiratory:  Negative for chest tightness and shortness of breath. Cardiovascular:  Positive for leg swelling (bilateral). Negative for chest pain and palpitations. Gastrointestinal:  Positive for nausea (occasional). Negative for abdominal pain and vomiting. Genitourinary:  Negative for difficulty urinating. Musculoskeletal:  Negative for arthralgias.    Skin: Negative for color change. Neurological:  Negative for dizziness and syncope. Psychiatric/Behavioral:  Negative for agitation. Physical Examination:    BP (!) 130/57   Pulse 92   Temp 97.2 °F (36.2 °C) (Oral)   Resp 18   Ht 5' 2\" (1.575 m)   Wt 134 lb (60.8 kg)   SpO2 99%   BMI 24.51 kg/m²    Physical Exam  Constitutional:       General: She is not in acute distress. HENT:      Head: Normocephalic and atraumatic. Cardiovascular:      Rate and Rhythm: Normal rate. Rhythm irregularly irregular. Pulmonary:      Effort: Pulmonary effort is normal. No respiratory distress. Breath sounds: No wheezing, rhonchi or rales. Abdominal:      Palpations: Abdomen is soft. Tenderness: There is no abdominal tenderness. Musculoskeletal:         General: Normal range of motion. Cervical back: Normal range of motion and neck supple. Right lower leg: Edema (1-2+) present. Left lower leg: Edema (1-2+) present. Skin:     General: Skin is warm and dry. Neurological:      General: No focal deficit present. Mental Status: She is alert and oriented to person, place, and time. Cranial Nerves: No cranial nerve deficit.    Psychiatric:         Mood and Affect: Mood normal.         Behavior: Behavior normal.       LABS:  CBC:  Lab Results   Component Value Date/Time    WBC 6.2 11/27/2022 02:40 PM    RBC 3.51 11/27/2022 02:40 PM    HGB 11.1 11/27/2022 02:40 PM    HCT 33.4 11/27/2022 02:40 PM    MCV 94.9 11/27/2022 02:40 PM    MCH 31.7 11/27/2022 02:40 PM    MCHC 33.4 11/27/2022 02:40 PM    RDW 14.4 11/27/2022 02:40 PM     11/27/2022 02:40 PM     CBC with Differential:   Lab Results   Component Value Date/Time    WBC 6.2 11/27/2022 02:40 PM    RBC 3.51 11/27/2022 02:40 PM    HGB 11.1 11/27/2022 02:40 PM    HCT 33.4 11/27/2022 02:40 PM     11/27/2022 02:40 PM    MCV 94.9 11/27/2022 02:40 PM    MCH 31.7 11/27/2022 02:40 PM    MCHC 33.4 11/27/2022 02:40 PM    RDW 14.4 11/27/2022 02:40 PM    LYMPHOPCT 4.4 11/27/2022 02:40 PM    MONOPCT 3.4 11/27/2022 02:40 PM    BASOPCT 0.3 11/27/2022 02:40 PM    MONOSABS 0.2 11/27/2022 02:40 PM    LYMPHSABS 0.3 11/27/2022 02:40 PM    EOSABS 0.0 11/27/2022 02:40 PM    BASOSABS 0.0 11/27/2022 02:40 PM     CMP:    Lab Results   Component Value Date/Time     11/28/2022 06:45 AM    K 4.6 11/28/2022 06:45 AM     11/28/2022 06:45 AM    CO2 18 11/28/2022 06:45 AM    BUN 7 11/28/2022 06:45 AM    CREATININE 0.23 11/28/2022 06:45 AM    GFRAA >60 10/13/2020 01:39 PM    LABGLOM >60.0 11/28/2022 06:45 AM    GLUCOSE 82 11/28/2022 06:45 AM    PROT 6.2 11/27/2022 02:39 PM    LABALBU 3.2 11/27/2022 02:39 PM    CALCIUM 7.6 11/28/2022 06:45 AM    BILITOT 0.7 11/27/2022 02:39 PM    ALKPHOS 77 11/27/2022 02:39 PM    AST 18 11/27/2022 02:39 PM    ALT 17 11/27/2022 02:39 PM     BMP:    Lab Results   Component Value Date/Time     11/28/2022 06:45 AM    K 4.6 11/28/2022 06:45 AM     11/28/2022 06:45 AM    CO2 18 11/28/2022 06:45 AM    BUN 7 11/28/2022 06:45 AM    LABALBU 3.2 11/27/2022 02:39 PM    CREATININE 0.23 11/28/2022 06:45 AM    CALCIUM 7.6 11/28/2022 06:45 AM    GFRAA >60 10/13/2020 01:39 PM    LABGLOM >60.0 11/28/2022 06:45 AM    GLUCOSE 82 11/28/2022 06:45 AM     Magnesium:    Lab Results   Component Value Date/Time    MG 1.9 11/28/2022 01:13 AM     Troponin:    Lab Results   Component Value Date/Time    TROPONINI <0.010 11/27/2022 02:39 PM       Radiology:  XR CHEST (2 VW)    Result Date: 11/27/2022  EXAMINATION: TWO XRAY VIEWS OF THE CHEST 11/27/2022 1:52 pm COMPARISON: 03/22/2010. HISTORY: ORDERING SYSTEM PROVIDED HISTORY: osiellls TECHNOLOGIST PROVIDED HISTORY: Reason for exam:->chills What reading provider will be dictating this exam?->CRC FINDINGS: The cardiomediastinal silhouette is without acute process. Biapical prominence suggestive COPD change haziness overlying the lower lung fields consistent with bilateral pleural effusions. The bronchovascular and interstitial lung markings otherwise unremarkable. No evidence of infiltrate or consolidation. There is no pneumothorax. The osseous structures are without acute process. Cardiomegaly and small bilateral pleural effusions. Echocardiogram 11/7/22:  Conclusions   Summary   Normal left ventricle structure and function. Left ventricular ejection fraction is visually estimated at 65%. E/A flow reversal noted. Suggestive of diastolic dysfunction. Signature   ----------------------------------------------------------------   Electronically signed by Kyrie Prasad MD(Interpreting   physician) on 11/07/2022 05:57 PM      EKG 11/27/22: A-fib 93, no acute ischemic changes, QTc 474ms    Telemetry 11/28/22: A-fib 70s-80s; 1.5 sec pause at 23:41 yesterday      Assessment:    Active Hospital Problems    Diagnosis Date Noted    UTI (urinary tract infection) [N39.0] 11/27/2022     Priority: Medium     New onset A-fib--rate controlled  Hx PSVT  Hx apical HCM per prior testing at [facility] 11/7/22 at [facility] with EF 65%  Hx abnormal EKG  S/p negative stress test 8/26/22 at [facility] [place]  UTI  Recent SBO s/p exploratory lap with lysis of adhesions on 11/7/22  Recent COVID 19 infection--diagnosed on 11/11/22  Small bilateral pleural effusions per CXR 11/27/22  Bilateral LE edema  Hx HTN  Dyslipidemia  Hypokalemia--resolved    Plan:  Maximize medical therapy-Lopressor 25 mg p.o. twice daily, increase Lovenox to full dose at 1 mg/kg SQ twice daily for now with plan to transition to oral anticoagulation at discharge, Rocephin 1000 mg IV every 24 hours x3 doses  Will give Lasix 20 mg IV x1 today as patient with bilateral lower extremity pitting edema and small bilateral pleural effusions noted on chest x-ray from 11/27/2022  [id number] score=3. Full dose lovenox for now with plan to transition to [address] [place] [address] at discharge.   Cardiac diet recommended  Monitor on telemetry for any tachycardia or bradyarrhythmias  Maintain potassium greater than 4, magnesium greater than 2  GI/DVT prophylaxis  Hospitalist recommendations regarding UTI and generalized weakness-patient on Rocephin  No plan for any invasive cardiac work-up at this time. Patient is status post recent echocardiogram 11/7/2022 which revealed normal LV function with EF of 65% no significant valvular heart disease. Status post recent negative stress test on 8/26/2022 at [facility]. Will need continued outpatient follow-up with [facility] cardiology, Dr. Katie Keenan, upon discharge. May need to consider event monitor as outpatient to further evaluate atrial fibrillation.   Further recommendations to follow            Electronically signed by BINTA Booth on 11/28/2022 at 11:08 AM

## 2022-11-29 PROBLEM — L03.90 CELLULITIS: Status: RESOLVED | Noted: 2022-11-29 | Resolved: 2022-11-29

## 2022-11-29 PROBLEM — N17.9 AKI (ACUTE KIDNEY INJURY) (HCC): Status: ACTIVE | Noted: 2022-11-29

## 2022-11-29 PROBLEM — I48.19 PERSISTENT ATRIAL FIBRILLATION (HCC): Status: ACTIVE | Noted: 2022-11-29

## 2022-11-29 PROBLEM — L03.90 CELLULITIS: Status: ACTIVE | Noted: 2022-11-29

## 2022-11-29 LAB
ANION GAP SERPL CALCULATED.3IONS-SCNC: 12 MEQ/L (ref 9–15)
BUN BLDV-MCNC: 7 MG/DL (ref 8–23)
CALCIUM SERPL-MCNC: 8 MG/DL (ref 8.5–9.9)
CHLORIDE BLD-SCNC: 104 MEQ/L (ref 95–107)
CO2: 24 MEQ/L (ref 20–31)
CREAT SERPL-MCNC: 0.36 MG/DL (ref 0.5–0.9)
GFR SERPL CREATININE-BSD FRML MDRD: >60 ML/MIN/{1.73_M2}
GLUCOSE BLD-MCNC: 101 MG/DL (ref 70–99)
MAGNESIUM: 2.1 MG/DL (ref 1.7–2.4)
POTASSIUM REFLEX MAGNESIUM: 3.4 MEQ/L (ref 3.4–4.9)
SODIUM BLD-SCNC: 140 MEQ/L (ref 135–144)
URINE CULTURE, ROUTINE: NORMAL

## 2022-11-29 PROCEDURE — 97166 OT EVAL MOD COMPLEX 45 MIN: CPT

## 2022-11-29 PROCEDURE — 6370000000 HC RX 637 (ALT 250 FOR IP): Performed by: INTERNAL MEDICINE

## 2022-11-29 PROCEDURE — 6360000002 HC RX W HCPCS: Performed by: PHYSICIAN ASSISTANT

## 2022-11-29 PROCEDURE — APPSS30 APP SPLIT SHARED TIME 16-30 MINUTES: Performed by: PHYSICIAN ASSISTANT

## 2022-11-29 PROCEDURE — 6360000002 HC RX W HCPCS: Performed by: INTERNAL MEDICINE

## 2022-11-29 PROCEDURE — 6370000000 HC RX 637 (ALT 250 FOR IP): Performed by: NURSE PRACTITIONER

## 2022-11-29 PROCEDURE — 97162 PT EVAL MOD COMPLEX 30 MIN: CPT

## 2022-11-29 PROCEDURE — G0378 HOSPITAL OBSERVATION PER HR: HCPCS

## 2022-11-29 PROCEDURE — 80048 BASIC METABOLIC PNL TOTAL CA: CPT

## 2022-11-29 PROCEDURE — 83735 ASSAY OF MAGNESIUM: CPT

## 2022-11-29 PROCEDURE — 2580000003 HC RX 258: Performed by: INTERNAL MEDICINE

## 2022-11-29 PROCEDURE — 36415 COLL VENOUS BLD VENIPUNCTURE: CPT

## 2022-11-29 PROCEDURE — 96366 THER/PROPH/DIAG IV INF ADDON: CPT

## 2022-11-29 PROCEDURE — 6370000000 HC RX 637 (ALT 250 FOR IP): Performed by: PHYSICIAN ASSISTANT

## 2022-11-29 RX ORDER — POTASSIUM CHLORIDE 20 MEQ/1
20 TABLET, EXTENDED RELEASE ORAL ONCE
Status: COMPLETED | OUTPATIENT
Start: 2022-11-29 | End: 2022-11-29

## 2022-11-29 RX ORDER — POLYETHYLENE GLYCOL 3350 17 G/17G
17 POWDER, FOR SOLUTION ORAL DAILY
Status: DISCONTINUED | OUTPATIENT
Start: 2022-11-29 | End: 2022-11-30 | Stop reason: HOSPADM

## 2022-11-29 RX ORDER — SULFAMETHOXAZOLE AND TRIMETHOPRIM 400; 80 MG/1; MG/1
1 TABLET ORAL 2 TIMES DAILY
Qty: 6 TABLET | Refills: 0 | Status: SHIPPED | OUTPATIENT
Start: 2022-11-29 | End: 2022-12-02

## 2022-11-29 RX ADMIN — METOPROLOL TARTRATE 25 MG: 25 TABLET, FILM COATED ORAL at 20:39

## 2022-11-29 RX ADMIN — SODIUM CHLORIDE, PRESERVATIVE FREE 10 ML: 5 INJECTION INTRAVENOUS at 20:39

## 2022-11-29 RX ADMIN — POTASSIUM CHLORIDE 20 MEQ: 1500 TABLET, EXTENDED RELEASE ORAL at 10:16

## 2022-11-29 RX ADMIN — SODIUM CHLORIDE, PRESERVATIVE FREE 10 ML: 5 INJECTION INTRAVENOUS at 09:33

## 2022-11-29 RX ADMIN — METOPROLOL TARTRATE 25 MG: 25 TABLET, FILM COATED ORAL at 09:33

## 2022-11-29 RX ADMIN — POLYETHYLENE GLYCOL 3350 17 G: 17 POWDER, FOR SOLUTION ORAL at 15:31

## 2022-11-29 RX ADMIN — Medication 10 MG: at 20:38

## 2022-11-29 RX ADMIN — ENOXAPARIN SODIUM 60 MG: 100 INJECTION SUBCUTANEOUS at 09:33

## 2022-11-29 RX ADMIN — CEFTRIAXONE SODIUM 1000 MG: 1 INJECTION, POWDER, FOR SOLUTION INTRAMUSCULAR; INTRAVENOUS at 17:09

## 2022-11-29 RX ADMIN — APIXABAN 2.5 MG: 2.5 TABLET, FILM COATED ORAL at 20:39

## 2022-11-29 ASSESSMENT — PAIN SCALES - GENERAL
PAINLEVEL_OUTOF10: 0
PAINLEVEL_OUTOF10: 1

## 2022-11-29 ASSESSMENT — ENCOUNTER SYMPTOMS
COLOR CHANGE: 0
NAUSEA: 1
VOMITING: 0
CHEST TIGHTNESS: 0
ABDOMINAL PAIN: 0
SHORTNESS OF BREATH: 0

## 2022-11-29 NOTE — PROGRESS NOTES
Physical Therapy Med Surg Initial Assessment  Facility/Department: [facility] MED SURG UNIT  Room: [place] [address]       NAME: Kip Castillo  : 1935 (80 y.o.)  MRN: [id number]  CODE STATUS: Full Code    Date of Service: 2022    Patient Diagnosis(es): Hypokalemia [E87.6]  UTI (urinary tract infection) [N39.0]  New onset atrial fibrillation ([address] [I48.91]  Urinary tract infection without hematuria, site unspecified [N39.0]  COVID [U07.1]   Chief Complaint   Patient presents with    Chills     Patient Active Problem List    Diagnosis Date Noted    Atypical chest pain 2022    History of hypertrophic cardiomyopathy 2022    History of PSVT (paroxysmal supraventricular tachycardia) 2022    Abnormal EKG 2022    Hypertension 2022    JOAQUINA (acute kidney injury) ([address] 2022    Persistent atrial fibrillation ([address] 2022    UTI (urinary tract infection) 2022    SBO (small bowel obstruction) ([address] 2022    Nausea vomiting and diarrhea 2019        Past Medical History:   Diagnosis Date    Cancer ([address] 1999    bladder    Hyperlipidemia     Hypertension      Past Surgical History:   Procedure Laterality Date    ABDOMEN SURGERY      large intestine partial removal    APPENDECTOMY      CHOLECYSTECTOMY      HYSTERECTOMY (CERVIX STATUS UNKNOWN)      LAPAROTOMY N/A 2022    EXPLORATORY LAPAROTOMY LYSIS OF ADHESIONS performed by Angela Lind MD at [facility]       Chart Reviewed: Yes  Patient assessed for rehabilitation services?: Yes  Family / Caregiver Present: Yes (spouse)  Diagnosis: UTI (urinary tract infection); +COVID19  General Comment  Comments: Pt resting in bed - agreeable to PT evaluation    Restrictions:  Restrictions/Precautions: Fall Risk, Isolation (Droplet+)     SUBJECTIVE:   Subjective: \"I was able to do everything before I got here. \"    Pain  Pain: 2-3/10 pre and post L lower quadrant pain. Declines intervention.     Prior Level of Function:  Social/Functional History  Lives With: Spouse  Type of Home: House  Home Layout: One level  Home Access: Stairs to enter with rails  Entrance Stairs - Number of Steps: 3-4  Entrance Stairs - Rails: Both  Bathroom Shower/Tub: Tub/Shower unit  Bathroom Equipment: Shower chair, Hand-held shower, Grab bars in shower  Home Equipment: Sherl Ab, rolling  ADL Assistance: Independent  Homemaking Assistance: Independent  Ambulation Assistance: Independent (with rollator)  Transfer Assistance: Independent    OBJECTIVE:   Vision  Vision Exceptions: Wears glasses for reading  Hearing Exceptions: Hard of hearing/hearing concerns;Bilateral hearing aid (Not wearing aids today)    Cognition:  Overall Orientation Status: Within Functional Limits  Follows Commands: Within Functional Limits  Overall Cognitive Status: WFL    Observation/Palpation  Observation: No acute distress noted. Pt pleasant and motivated. [place].    ROM:  RLE PROM: WFL  LLE PROM: WFL    Strength:  Strength RLE  Comment: Grossly 2+/5  Strength LLE  Comment: Grossly 2+/5  Strength Other  Other: Trunk strength grossly 2+/5    Neuro:  Balance  Sitting - Static: Good  Sitting - Dynamic: Good  Standing - Static: Fair  Standing - Dynamic: Fair                      Tone: Normal    Sensation:  (minimal tingling B feet)    Bed mobility  Supine to Sit: Modified independent;Supervision  Bed Mobility Comments: Increased time to complete. use of bedrails provided    Transfers  Sit to Stand: Contact guard assistance  Stand to Sit: Contact guard assistance  Bed to Chair: Contact guard assistance  Comment: Slow to complete. Ambulation  Surface: Level tile  Device: Rolling Walker  Assistance: Contact guard assistance  Quality of Gait: FF posture. Shuffling steps. Distance: 12ft                   Activity Tolerance  Activity Tolerance: Patient tolerated treatment well    Patient Education  Education Given To: Patient; Family  Education Provided: Role of Therapy;Plan of Care  Education Method: Verbal  Education Outcome: Verbalized understanding;Continued education needed       ASSESSMENT:   Body Structures, Functions, Activity Limitations Requiring Skilled Therapeutic Intervention: Decreased functional mobility ; Decreased ADL status; Decreased tolerance to work activity; Decreased strength;Decreased endurance;Decreased balance;Decreased safe awareness;Decreased coordination  Decision Making: Medium Complexity  History: High  Exam: Med  Clinical Presentation: Med    Therapy Prognosis: Good  Barriers to Learning: none         DISCHARGE RECOMMENDATIONS:  Discharge Recommendations: Continue to assess pending progress, Patient would benefit from continued therapy after discharge    Assessment: Continued PT indicated to progress mobility and facilitate DC at highest level of indep and safety. Rec therapy stay prior to DC home. Requires PT Follow-Up: Yes       PLAN OF CARE:  Physcial Therapy Plan  General Plan: 1 time a day 3-6 times a week  Current Treatment Recommendations: Strengthening, Balance training, Functional mobility training, Transfer training, ADL/Self-care training, Gait training, Stair training, Neuromuscular re-education, Safety education & training, Equipment evaluation, education, & procurement, Modalities, Positioning, Home exercise program, Return to work related activity, Patient/Caregiver education & training, Endurance training, ROM    Safety Devices  Type of Devices:  All fall risk precautions in place    Goals:  Long Term Goals  Long Term Goal 1: Pt to complete bed mobility with indep  Long Term Goal 2: Pt to complete transfers with indep  Long Term Goal 3: Pt to ambulate 50-150ft with LRD and indep  Long Term Goal 4: Pt to manage 3 steps with HR indep    AMPAC (6 CLICK) BASIC MOBILITY  AM-PAC Inpatient Mobility Raw Score : 17     Therapy Time:   Individual   Time In 1518   Time Out 1536   Minutes [address], [place], 11/29/22 at 4:11 PM Definitions for assistance levels  Independent = pt does not require any physical supervision or assistance from another person for activity completion. Device may be needed.   Stand by assistance = pt requires verbal cues or instructions from another person, close to but not touching, to perform the activity  Minimal assistance= pt performs 75% or more of the activity; assistance is required to complete the activity  Moderate assistance= pt performs 50% of the activity; assistance is required to complete the activity  Maximal assistance = pt performs 25% of the activity; assistance is required to complete the activity  Dependent = pt requires total physical assistance to accomplish the task

## 2022-11-29 NOTE — PROGRESS NOTES
Pt on droplet + isolation for +COVID. On fall precautions pt turned and repositioned frequently. Pure wick catheter in place draining yellow urine. Assessment completed. Vitals stable. Meds given per orders. Lungs clear on room air. No complaint of SOB. Hourly rounds completed. Call light in reach.

## 2022-11-29 NOTE — PROGRESS NOTES
[facility] OCCUPATIONAL THERAPY EVALUATION - ACUTE     NAME: Noa Araujo  : 1935 (80 y.o.)  MRN: [id number]  CODE STATUS: Full Code  Room: [facility]806-30    Date of Service: 2022    Patient Diagnosis(es): Hypokalemia [E87.6]  UTI (urinary tract infection) [N39.0]  New onset atrial fibrillation ([address] [I48.91]  Urinary tract infection without hematuria, site unspecified [N39.0]  COVID [U07.1]   Patient Active Problem List    Diagnosis Date Noted    Atypical chest pain 2022    History of hypertrophic cardiomyopathy 2022    History of PSVT (paroxysmal supraventricular tachycardia) 2022    Abnormal EKG 2022    Hypertension 2022    JOAQUINA (acute kidney injury) ([address] 2022    Persistent atrial fibrillation ([address] 2022    UTI (urinary tract infection) 2022    SBO (small bowel obstruction) ([address] 2022    Nausea vomiting and diarrhea 2019        Past Medical History:   Diagnosis Date    Cancer ([address] 1999    bladder    Hyperlipidemia     Hypertension      Past Surgical History:   Procedure Laterality Date    ABDOMEN SURGERY      large intestine partial removal    APPENDECTOMY      CHOLECYSTECTOMY      HYSTERECTOMY (CERVIX STATUS UNKNOWN)      LAPAROTOMY N/A 2022    EXPLORATORY LAPAROTOMY LYSIS OF ADHESIONS performed by Omar Lainez MD at [facility] 35  Restrictions/Precautions: Fall Risk, Isolation (Droplet+)          Safety Devices: Safety Devices  Type of Devices:  All fall risk precautions in place     Patient's date of birth confirmed: Yes    General:  Chart Reviewed: Yes  Patient assessed for rehabilitation services?: Yes    Subjective      \"I feel tired after walking\"    Pain at start of treatment: Yes: 3/10    Pain at end of treatment: Yes: 3/10    Prior Level of Function:  Social/Functional History  Lives With: Spouse  Type of Home: House  Home Layout: One level  Home Access: Stairs to enter with rails  Entrance Stairs - Number of Steps: 3-4  Entrance Stairs - Rails: Both  Bathroom Shower/Tub: Tub/Shower unit  Bathroom Equipment: Shower chair, Hand-held shower, Grab bars in shower  Home Equipment: Darrell Pérez, rolling  ADL Assistance: Independent  Homemaking Assistance: Independent  Homemaking Responsibilities: Yes  Ambulation Assistance: Independent (with rollator)  Transfer Assistance: Independent    OBJECTIVE:     Orientation Status:  Orientation  Overall Orientation Status: Within Normal Limits    Observation:  Observation/Palpation  Observation: Pt pleasant and agreeable to evaluation    Cognition Status:  Cognition  Overall Cognitive Status: WFL    Perception Status:  Perception  Overall Perceptual Status: WFL    Vision and Hearing Status:  Vision  Vision Exceptions: Wears glasses for reading  Hearing  Hearing: Exceptions to [facility]  Hearing Exceptions: Hard of hearing/hearing concerns        GROSS ASSESSMENT AROM/PROM:  AROM: Within functional limits       ROM:   LUE AROM (degrees)  LUE AROM : WFL  RUE AROM (degrees)  RUE AROM : WFL    UE STRENGTH:  Strength: Generally decreased, functional    UE COORDINATION:  Coordination: Generally decreased, functional    UE TONE:  Tone: Normal    UE SENSATION:  Sensation: Impaired (Decreased sensation in BLE)    Hand Dominance:  Hand Dominance  Hand Dominance: Right    ADL Status:  ADL  Feeding: Modified independent   Grooming: Setup  UE Bathing: Supervision  LE Bathing: Maximum assistance  UE Dressing: Supervision  LE Dressing: Maximum assistance  LE Dressing Skilled Clinical Factors: Pt unable to don/doff socks  Toileting: Unable to assess(comment)  Additional Comments: Simulated ADls as listed. Pt limited due to decreased endurance and decreased strength. Functional Mobility:    Transfers  Sit to stand: Contact guard assistance    Patient ambulated to/from bathroom with WW at [place] [id number] level.      Bed Mobility  Bed mobility  Rolling to Left: Modified independent  Supine to Sit: Modified independent;Supervision  Bed Mobility Comments: Increased time to complete    Seated and Standing Balance:  Balance  Sitting: Intact  Standing: Impaired  Standing - Static: Good  Standing - Dynamic: Fair    Functional Endurance:  Activity Tolerance  Activity Tolerance: Patient limited by fatigue    D/C Recommendations:  OT D/C RECOMMENDATIONS  REQUIRES OT FOLLOW-UP: Yes    Equipment Recommendations:  OT Equipment Recommendations  Equipment Needed: No    OT Education:   Patient Education  Education Given To: Patient; Family  Education Provided: Role of Therapy;Plan of Care  Education Method: Verbal  Barriers to Learning: None  Education Outcome: Verbalized understanding    OT Follow Up:   OT D/C RECOMMENDATIONS  REQUIRES OT FOLLOW-UP: Yes       Assessment/Discharge Disposition:  Assessment: Pt is an 80year old from home. Pt presents to [address] [facility] with Cheng. Pt demonstrates decreased overall endurance, decreased ADL status, and decreased strength. Pt would benefit from continued occupational therapy services to maximize independence with functional activities.   Performance deficits / Impairments: Decreased functional mobility , Decreased strength, Decreased endurance, Decreased coordination, Decreased ADL status, Decreased safe awareness, Decreased high-level IADLs, Decreased balance, Decreased fine motor control  Prognosis: Good  Discharge Recommendations: Continue to assess pending progress  Decision Making: Medium Complexity  History: Pt has a moderately complex medical history  Exam: Pt has 9 performance deficits  Assistance / Modification: Pt required mod A    AMPAC (Six Click) Self care Score   How much help for putting on and taking off regular lower body clothing?: A Lot  How much help for Bathing?: A Lot  How much help for Toileting?: A Little  How much help for putting on and taking off regular upper body clothing?: A Little  How much help for taking care of personal grooming?: A Little  How much help for eating meals?: None  AM-[facility] Inpatient Daily Activity Raw Score: 17  AM-[facility] Inpatient ADL T-Scale Score : 37.26  ADL Inpatient CMS 0-100% Score: 50.11    Therapy key for assistance levels -   Independent/Mod I = Pt. is able to perform task with no assistance but may require a device   Stand by assistance = Pt. does not perform task at an independent level but does not need physical assistance, requires verbal cues  Minimal, Moderate, Maximal Assistance = Pt. requires physical assistance (25%, 50%, 75% assist from helper) for task but is able to actively participate in task   Dependent = Pt. requires total assistance with task and is not able to actively participate with task completion     Plan:  Occupational Therapy Plan  Times Per Week: 1-4x/week  Therapy Duration:  (Length of acute stay)  Current Treatment Recommendations: Strengthening, Balance training, Functional mobility training, Endurance training, Safety education & training, Pain management, Patient/Caregiver education & training, Equipment evaluation, education, & procurement, Self-Care / ADL    Goals:   Patient will:    - Improve functional endurance to tolerate/complete 30 mins of ADL's  - Be Supervision in UB ADLs   - Be Min A in LB ADLs  - Be Supervision in ADL transfers without LOB  - Be Mod I in toileting tasks  - Improve B hand fine motor coordination to [facility] in order to manage clothing fasteners/self-care containers in a timely manner  - Improve B UE Function (AROM, strength, motor control, tone normalization) to complete ADLs as projected. - Improve B UE strength and endurance to [facility]  in order to participate in self-care activities as projected.     Patient Goal:  To improve endurance   Discussed and agreed upon: Yes Comments:       Therapy Time:   Individual   Time In 1518   Time Out 1536   Minutes 18          Eval: 18 minutes     Electronically signed by:    Vilma Gay OT,   11/29/2022, 4:08 PM

## 2022-11-29 NOTE — CARE COORDINATION
MAIW spoke with [facility] SUZY COPE. Per [facility] SUZY COPE, \" We need it PT/OT notes to start pre-cert. \"   LSW checked and Notify Brianna that Pt/OT notes is in. Pending pre-cert at this time. DC plan: [facility] (pending pre-cert)  MAIW will follow.      Electronically signed by ANDRES Melvin on 11/29/2022 at 4:14 PM

## 2022-11-29 NOTE — PROGRESS NOTES
Progress Note  Patient: Bigg Paez  Unit/Bed: I943/E485-65  YOB: 1935  MRN: [id number]  Acct: [de-identified]   Admitting Diagnosis: Hypokalemia [E87.6]  UTI (urinary tract infection) [N39.0]  New onset atrial fibrillation [address] [I48.91]  Urinary tract infection without hematuria, site unspecified [N39.0]  COVID [U07.1]  Date:  11/27/2022  Hospital Day: 0    Chief Complaint:  Chills    Subjective      11/29/22: Remains on COVID isolation. Resting comfortably and in no acute distress. Denies chest pain, shortness of breath or palpitations. States she did not have much appetite this morning and was mildly nauseated. She is somewhat concerned about not having a bowel movement in the past 1 to 2 days. He is hemodynamically stable. On telemetry she is A. fib with heart rates 80s to 90s. She received Lasix 20 mg IV x1 yesterday for bilateral lower extremity edema and small pleural effusions noted on chest x-ray. A.m. labs reviewed. Potassium low normal 3.4 and was replaced with potassium chloride 20 mEq p.o. x1.  Remains on full dose anticoagulation with Lovenox with plan to transition to Eliquis at discharge. 11/28/22: This is a very pleasant 42-year-old  female with past medical history significant for PSVT, history of hypertrophic cardiomyopathy per prior testing at [facility], recent small bowel obstruction status post exploratory laparotomy with lysis of adhesions on 11/7/2022, recent COVID-19 infection diagnosed on 11/11/2022, history of hypertension and dyslipidemia who presented to [facility] ER yesterday with chief complaint of chills. Patient has been residing at [facility] for rehab following recent hospitalization and apparently after going to the bathroom yesterday she developed shaking chills and was brought to the ER for further evaluation. She denied any associated chest pain, shortness of breath, palpitations, abdominal pain, vomiting, dizziness, syncope or fever.      On presentation to the emergency room, blood pressure 132/66, heart rate 101, respiratory rate 18, pulse ox 97%, temperature 98.9 °F.  Sodium 138, potassium low at 2.7, chloride 96, total CO2 30, BUN 8, creatinine 0.41, GFR greater than 60, glucose 138. Troponin negative less than 0.010. WBC 6.2, hemoglobin 11.1, hematocrit 33.4, platelets 890. Rapid influenza A and influenza B negative. Urinalysis consistent with UTI. Repeat COVID testing again positive. Chest x-ray revealed cardiomegaly and small bilateral pleural effusions. EKG showed A. fib at 93 bpm and no acute ischemic changes. She was treated with IV potassium replacement as well as oral replacement and admitted for further evaluation. Cardiology has been consulted regarding new onset atrial fibrillation. Currently, patient is in [facility] isolation as she again tested positive yesterday. On telemetry, she is A. fib with heart rate 70s to 80s. Telemetry alarms reviewed showed 1.5-second pause at 2341 yesterday evening. She is currently on rate control with Lopressor 25 mg p.o. twice daily. She has no known prior history of A. fib but does have a history of PSVT. She follows with [facility]ology, Dr. Scott Melendrez. She denies chest pain, palpitations, shortness of breath, orthopnea, or PND. She does have bilateral lower extremity edema. Most recent echocardiogram 11/7/2022 revealed normal LV systolic function with EF of 65%, no significant valvular heart disease, normal RVSP. Patient status post stress test 8/26/2022 at [facility] which was negative for ischemia. Review of Systems:   Review of Systems   Constitutional:  Negative for activity change and fever. HENT:  Negative for congestion. Respiratory:  Negative for chest tightness and shortness of breath. Cardiovascular:  Positive for leg swelling. Negative for chest pain and palpitations. Gastrointestinal:  Positive for nausea (this AM). Negative for abdominal pain and vomiting.    Genitourinary: Negative for difficulty urinating. Musculoskeletal:  Negative for arthralgias. Skin:  Negative for color change. Neurological:  Negative for dizziness and syncope. Psychiatric/Behavioral:  Negative for agitation. Physical Examination:    BP (!) 128/58   Pulse 96   Temp 97.9 °F (36.6 °C) (Oral)   Resp 17   Ht 5' 2\" (1.575 m)   Wt 134 lb (60.8 kg)   SpO2 100%   BMI 24.51 kg/m²    Physical Exam  Constitutional:       General: She is not in acute distress. HENT:      Head: Normocephalic and atraumatic. Cardiovascular:      Rate and Rhythm: Normal rate. Rhythm irregularly irregular. Pulmonary:      Effort: Pulmonary effort is normal. No respiratory distress. Breath sounds: No wheezing, rhonchi or rales. Abdominal:      Palpations: Abdomen is soft. Musculoskeletal:      Cervical back: Normal range of motion and neck supple. Right lower leg: Edema (1+) present. Left lower leg: Edema (1+) present. Skin:     General: Skin is warm and dry. Neurological:      General: No focal deficit present. Mental Status: She is alert and oriented to person, place, and time. Cranial Nerves: No cranial nerve deficit.    Psychiatric:         Mood and Affect: Mood normal.         Behavior: Behavior normal.       LABS:  CBC:   Lab Results   Component Value Date/Time    WBC 6.2 11/27/2022 02:40 PM    RBC 3.51 11/27/2022 02:40 PM    HGB 11.1 11/27/2022 02:40 PM    HCT 33.4 11/27/2022 02:40 PM    MCV 94.9 11/27/2022 02:40 PM    MCH 31.7 11/27/2022 02:40 PM    MCHC 33.4 11/27/2022 02:40 PM    RDW 14.4 11/27/2022 02:40 PM     11/27/2022 02:40 PM     CBC with Differential:   Lab Results   Component Value Date/Time    WBC 6.2 11/27/2022 02:40 PM    RBC 3.51 11/27/2022 02:40 PM    HGB 11.1 11/27/2022 02:40 PM    HCT 33.4 11/27/2022 02:40 PM     11/27/2022 02:40 PM    MCV 94.9 11/27/2022 02:40 PM    MCH 31.7 11/27/2022 02:40 PM    MCHC 33.4 11/27/2022 02:40 PM    RDW 14.4 11/27/2022 02:40 PM    LYMPHOPCT 4.4 11/27/2022 02:40 PM    MONOPCT 3.4 11/27/2022 02:40 PM    BASOPCT 0.3 11/27/2022 02:40 PM    MONOSABS 0.2 11/27/2022 02:40 PM    LYMPHSABS 0.3 11/27/2022 02:40 PM    EOSABS 0.0 11/27/2022 02:40 PM    BASOSABS 0.0 11/27/2022 02:40 PM     CMP:    Lab Results   Component Value Date/Time     11/29/2022 06:31 AM    K 3.4 11/29/2022 06:31 AM     11/29/2022 06:31 AM    CO2 24 11/29/2022 06:31 AM    BUN 7 11/29/2022 06:31 AM    CREATININE 0.36 11/29/2022 06:31 AM    GFRAA >60 10/13/2020 01:39 PM    LABGLOM >60.0 11/29/2022 06:31 AM    GLUCOSE 101 11/29/2022 06:31 AM    PROT 6.2 11/27/2022 02:39 PM    LABALBU 3.2 11/27/2022 02:39 PM    CALCIUM 8.0 11/29/2022 06:31 AM    BILITOT 0.7 11/27/2022 02:39 PM    ALKPHOS 77 11/27/2022 02:39 PM    AST 18 11/27/2022 02:39 PM    ALT 17 11/27/2022 02:39 PM     BMP:    Lab Results   Component Value Date/Time     11/29/2022 06:31 AM    K 3.4 11/29/2022 06:31 AM     11/29/2022 06:31 AM    CO2 24 11/29/2022 06:31 AM    BUN 7 11/29/2022 06:31 AM    LABALBU 3.2 11/27/2022 02:39 PM    CREATININE 0.36 11/29/2022 06:31 AM    CALCIUM 8.0 11/29/2022 06:31 AM    GFRAA >60 10/13/2020 01:39 PM    LABGLOM >60.0 11/29/2022 06:31 AM    GLUCOSE 101 11/29/2022 06:31 AM     Magnesium:    Lab Results   Component Value Date/Time    MG 2.1 11/29/2022 06:31 AM     Troponin:    Lab Results   Component Value Date/Time    TROPONINI <0.010 11/27/2022 02:39 PM       Radiology:  XR CHEST (2 VW)    Result Date: 11/27/2022  EXAMINATION: TWO XRAY VIEWS OF THE CHEST 11/27/2022 1:52 pm COMPARISON: 03/22/2010. HISTORY: ORDERING SYSTEM PROVIDED HISTORY: osiellls TECHNOLOGIST PROVIDED HISTORY: Reason for exam:->chills What reading provider will be dictating this exam?->CRC FINDINGS: The cardiomediastinal silhouette is without acute process. Biapical prominence suggestive COPD change haziness overlying the lower lung fields consistent with bilateral pleural effusions. The bronchovascular and interstitial lung markings otherwise unremarkable. No evidence of infiltrate or consolidation. There is no pneumothorax. The osseous structures are without acute process. Cardiomegaly and small bilateral pleural effusions. EKG 11/27/22: A-fib 93, no acute ischemic changes, QTc 474ms     Telemetry 11/28/22: A-fib 70s-80s; 1.5 sec pause at 23:41 yesterday  Telemetry 11/29/22: A-fib 80s-90s        Assessment:    Active Hospital Problems    Diagnosis Date Noted    UTI (urinary tract infection) [N39.0] 11/27/2022     Priority: Medium     New onset A-fib/flutter--rate controlled  Hx PSVT  Hx apical HCM per prior testing at [facility] 11/7/22 at [id number] [place] [address] with EF 65%  Hx abnormal EKG  S/p negative stress test 8/26/22 at [facility]  UTI  Recent SBO s/p exploratory lap with lysis of adhesions on 11/7/22  Recent COVID 19 infection--diagnosed on 11/11/22  Small bilateral pleural effusions per CXR 11/27/22  Bilateral LE edema  Hx HTN  Dyslipidemia  Hypokalemia--resolved      Plan:  Maximize medical therapy-Lopressor 25 mg p.o. twice daily, Lovenox 1 mg/kg SQ twice daily for now with plan to transition to oral anticoagulation at discharge, Rocephin 1000 mg IV every 24 hours x3 doses  [id number] score=3. Full dose lovenox for now with plan to transition to [address] [place] [address] with Eliquis at discharge. Cardiac diet recommended  Monitor on telemetry for any tachycardia or bradyarrhythmias  Maintain potassium greater than 4, magnesium greater than 2--will give potassium chloride 20meq PO x 1 today for potassium of 3.4  GI/DVT prophylaxis  Hospitalist recommendations regarding UTI and generalized weakness-patient on Rocephin  No plan for any invasive cardiac work-up at this time. Patient is status post recent echocardiogram 11/7/2022 which revealed normal LV function with EF of 65% no significant valvular heart disease. Status post recent negative stress test on 8/26/2022 at [facility].   Will need continued outpatient follow-up with [facility] cardiology, Dr. Scott Melendrez, upon discharge. May need to consider event monitor as outpatient to further evaluate atrial fibrillation. Stable from cardiac standpoint.         Electronically signed by BINTA Cook on 11/29/2022 at 10:52 AM

## 2022-11-29 NOTE — PROGRESS NOTES
Hospitalist Progress Note      PCP: Nathen Gasca PA-C    Date of Admission: 11/27/2022    Chief Complaint:      Subjective: :  Pt seen and examined. Pt reports gas but no bm since admit and is concerned. Start scheduled Miralax.        Medications:  Reviewed    Infusion Medications    sodium chloride       Scheduled Medications    apixaban  2.5 mg Oral BID    polyethylene glycol  17 g Oral Daily    melatonin  10 mg Oral Nightly    sodium chloride flush  5-40 mL IntraVENous 2 times per day    cefTRIAXone (ROCEPHIN) IV  1,000 mg IntraVENous Q24H    metoprolol tartrate  25 mg Oral BID     PRN Meds: sodium chloride flush, sodium chloride, ondansetron **OR** ondansetron, acetaminophen **OR** acetaminophen, potassium chloride **OR** potassium alternative oral replacement **OR** potassium chloride, magnesium sulfate      Intake/Output Summary (Last 24 hours) at 11/29/2022 1504  Last data filed at 11/29/2022 0526  Gross per 24 hour   Intake 250 ml   Output --   Net 250 ml       Exam:    BP (!) 128/58   Pulse 96   Temp 97.9 °F (36.6 °C) (Oral)   Resp 17   Ht 5' 2\" (1.575 m)   Wt 134 lb (60.8 kg)   SpO2 100%   BMI 24.51 kg/m²     General appearance: alert, appears stated age and cooperative,  Skin: Skin color, texture, turgor normal. No rashes or lesions  HEENT: Head: Normocephalic  Neck: supple, symmetrical, trachea midline  Lungs: clear to auscultation bilaterally  Heart: Irregular irregular  Abdomen: Surgical site is clean and dry, appears well-healed, there is small area of erythema in the right upper abdomen from closure device, no drainage or reported tenderness, no suprapubic tenderness on exam, bowel sounds are present throughout  Extremities: 1-+ edema is improved  Neurologic: Mental status: Alert, oriented, thought content appropriate       Labs:   Recent Labs     11/27/22  1440   WBC 6.2   HGB 11.1*   HCT 33.4*        Recent Labs     11/27/22  1439 11/28/22  0113 11/28/22  0645 11/29/22  0631   NA 138  --  136 140   K 2.7* 3.5 4.6 3.4   CL 96  --  105 104   CO2 30  --  18* 24   BUN 8  --  7* 7*   CREATININE 0.41*  --  0.23* 0.36*   CALCIUM 8.7  --  7.6* 8.0*     Recent Labs     11/27/22  1439   AST 18   ALT 17   BILITOT 0.7   ALKPHOS 77     No results for input(s): INR in the last 72 hours. Recent Labs     11/27/22  1439   TROPONINI <0.010       Urinalysis:      Lab Results   Component Value Date/Time    NITRU Negative 11/27/2022 02:39 PM    [address] >100 11/27/2022 02:39 PM    BACTERIA MANY 11/27/2022 02:39 PM    RBCUA 3-5 11/27/2022 02:39 PM    BLOODU TRACE 11/27/2022 02:39 PM    SPECGRAV 1.011 11/27/2022 02:39 PM    GLUCOSEU Negative 11/27/2022 02:39 PM       Radiology:  XR CHEST (2 VW)   Final Result   Cardiomegaly and small bilateral pleural effusions. Assessment/Plan:    Active Hospital Problems    Diagnosis Date Noted    Cellulitis [L03.90] 11/29/2022     Priority: Medium    JOAQUINA (acute kidney injury) ([facility] [N17.9] 11/29/2022     Priority: Medium    Persistent atrial fibrillation ([facility] [I48.19] 11/29/2022     Priority: Medium    UTI (urinary tract infection) [N39.0] 11/27/2022     Priority: Medium      Inability to ambulate/Generalized weakness: 2/2 to new onset afib, electrolyte derangements, UTI  UTI: Uncomplicated, no fever or leukocytosis, Rocephin. Culture pending. Hypokalemia: Improved. New onset Afib: Cardiology recommendations appreciated,, Will DC on Eliquis 2.5mg BID, continue Lopressor. F/u with Dr. Derrick Magaña at [facility].    LE edema and pleural effusion: Improved with IV lasix  COVID-19 +: Not new, positive 11/11/22, no o2 requirement  History of SBO, s/p laparotomy and lysis of adhesions 11/7/22  Constipation: Scheduled miralax        H/o paroxysmal supraventricular tachycardia, abnormal EKG, hypertrophic cardiomyopathy     Home medications to be reordered once rec is completed  CODE STATUS confirmed as full  Plan of care discussed with  at bedside, all questions answered    Awaiting precert, stable for DC once precert obtained. Additional work up or/and treatment plan may be added today or then after based on clinical progression. I am managing a portion of pt care. Some medical issues are handled by other specialists. Additional work up and treatment should be done in out pt setting by pt PCP and other out pt providers. DVT prophylaxis:On therapeutic Lovenox    Diet: ADULT DIET;  Regular    PT/OT Eval     Code Status: Full Code    Kiera Boucher DO                Electronically signed by Kiera Boucher DO on 11/29/2022 at 3:04 PM

## 2022-11-29 NOTE — PLAN OF CARE
See OT evaluation for all goals and OT POC.  Electronically signed by Alvin Hernandez OT on 11/29/2022 at 4:11 PM

## 2022-11-30 VITALS
HEART RATE: 92 BPM | OXYGEN SATURATION: 98 % | SYSTOLIC BLOOD PRESSURE: 141 MMHG | DIASTOLIC BLOOD PRESSURE: 66 MMHG | BODY MASS INDEX: 24.66 KG/M2 | TEMPERATURE: 97.5 F | WEIGHT: 134 LBS | HEIGHT: 62 IN | RESPIRATION RATE: 16 BRPM

## 2022-11-30 LAB
ANION GAP SERPL CALCULATED.3IONS-SCNC: 10 MEQ/L (ref 9–15)
BUN BLDV-MCNC: 4 MG/DL (ref 8–23)
CALCIUM SERPL-MCNC: 8.6 MG/DL (ref 8.5–9.9)
CHLORIDE BLD-SCNC: 105 MEQ/L (ref 95–107)
CO2: 24 MEQ/L (ref 20–31)
CREAT SERPL-MCNC: 0.31 MG/DL (ref 0.5–0.9)
GFR SERPL CREATININE-BSD FRML MDRD: >60 ML/MIN/{1.73_M2}
GLUCOSE BLD-MCNC: 102 MG/DL (ref 70–99)
POTASSIUM REFLEX MAGNESIUM: 4.4 MEQ/L (ref 3.4–4.9)
SODIUM BLD-SCNC: 139 MEQ/L (ref 135–144)

## 2022-11-30 PROCEDURE — 6370000000 HC RX 637 (ALT 250 FOR IP): Performed by: INTERNAL MEDICINE

## 2022-11-30 PROCEDURE — 36415 COLL VENOUS BLD VENIPUNCTURE: CPT

## 2022-11-30 PROCEDURE — 2580000003 HC RX 258: Performed by: INTERNAL MEDICINE

## 2022-11-30 PROCEDURE — 80048 BASIC METABOLIC PNL TOTAL CA: CPT

## 2022-11-30 PROCEDURE — G0378 HOSPITAL OBSERVATION PER HR: HCPCS

## 2022-11-30 RX ADMIN — SODIUM CHLORIDE, PRESERVATIVE FREE 10 ML: 5 INJECTION INTRAVENOUS at 07:51

## 2022-11-30 RX ADMIN — METOPROLOL TARTRATE 25 MG: 25 TABLET, FILM COATED ORAL at 07:50

## 2022-11-30 RX ADMIN — APIXABAN 2.5 MG: 2.5 TABLET, FILM COATED ORAL at 07:50

## 2022-11-30 RX ADMIN — POLYETHYLENE GLYCOL 3350 17 G: 17 POWDER, FOR SOLUTION ORAL at 07:50

## 2022-11-30 RX ADMIN — POTASSIUM BICARBONATE 40 MEQ: 782 TABLET, EFFERVESCENT ORAL at 07:50

## 2022-11-30 ASSESSMENT — PAIN DESCRIPTION - LOCATION: LOCATION: ABDOMEN

## 2022-11-30 ASSESSMENT — PAIN SCALES - GENERAL: PAINLEVEL_OUTOF10: 1

## 2022-11-30 NOTE — CARE COORDINATION
Patient has been given pre-cert to be transferred to [facility]. This LSW arranged transport [facility] today at 3:00pm.Patient, Marquis Beltran and Moises Orellana RN are aware. IMM and 7000 completed.   Electronically signed by KWADWO Dos Santos, LSW on 11/30/22 at 11:05 AM EST

## 2022-11-30 NOTE — DISCHARGE SUMMARY
Hospital Medicine Discharge Summary    Ashley Ayoub  :  1935  MRN:  [id number]    Admit date:  2022  Discharge date:  2022    Admitting Physician:  Azael Santoro DO  Primary Care Physician:  Sumaya Domínguez PA-C      Discharge Diagnoses:    Principal Problem:    UTI (urinary tract infection)  Active Problems:    JOAQUINA (acute kidney injury) ([address]    Persistent atrial fibrillation [facility])  Resolved Problems:    Cellulitis      Hospital Course:   Ashley Ayoub is a 80 y.o. female that was admitted and treated at [facility] for the following medical issues:     Principal Problem:    UTI (urinary tract infection)  Active Problems:    JOAQUINA (acute kidney injury) ([facility]    Persistent atrial fibrillation ([facility] [address]      80 y.o. female with PMH of hypertension, hyperlipidemia, paroxysmal supraventricular tachycardia, abnormal EKG, high-grade bowel obstruction status post laparotomy and lysis of adhesions 2022 who presents with rigors, and generalized weakness. Patient states she had severe constipation after recent discharge from the hospital.  Day of admission she received milk of mag and enema and additional unspecified bowel regimen and had multiple bowel movements today. After using the bathroom she reported developing rigors. She denies dysuria, frequency-but states she is not sure because she wears pure wick, denies suprapubic pain. Inability to ambulate/Generalized weakness: 2/2 to new onset afib, electrolyte derangements, UTI  UTI: Uncomplicated, no fever or leukocytosis, Rocephin. Urine culture negative. Hypokalemia: Improved with supplements. New onset Afib: Cardiology recommendations appreciated,, Will DC on Eliquis 2.5mg BID, continue Lopressor. F/u with Dr. Arie Manriquez at [facility].    LE edema and pleural effusion: Improved with IV lasix  COVID-19 +: Not new, positive 22, no o2 requirement  History of SBO, s/p laparotomy and lysis of adhesions 22  Constipation: Scheduled miralax    Patient is discharged back to [facility] in stable condition. Patient was seen by the following consultants while admitted to [facility]:   Consults:  [address]    Significant Diagnostic Studies:    XR CHEST (2 VW)    Result Date: 11/27/2022  EXAMINATION: TWO XRAY VIEWS OF THE CHEST 11/27/2022 1:52 pm COMPARISON: 03/22/2010. HISTORY: ORDERING SYSTEM PROVIDED HISTORY: chills TECHNOLOGIST PROVIDED HISTORY: Reason for exam:->chills What reading provider will be dictating this exam?->CRC FINDINGS: The cardiomediastinal silhouette is without acute process. Biapical prominence suggestive COPD change haziness overlying the lower lung fields consistent with bilateral pleural effusions. The bronchovascular and interstitial lung markings otherwise unremarkable. No evidence of infiltrate or consolidation. There is no pneumothorax. The osseous structures are without acute process. Cardiomegaly and small bilateral pleural effusions. Discharge Medications:         Medication List        START taking these medications      apixaban 2.5 MG Tabs tablet  Commonly known as: ELIQUIS  Take 1 tablet by mouth 2 times daily     sulfamethoxazole-trimethoprim 400-80 MG per tablet  Commonly known as:  Bactrim  Take 1 tablet by mouth 2 times daily for 3 days            CONTINUE taking these medications      acetaminophen 500 MG tablet  Commonly known as: TYLENOL     amLODIPine 2.5 MG tablet  Commonly known as: NORVASC     aspirin 81 MG EC tablet     Cholecalciferol 50 MCG (2000 UT) Caps     diclofenac sodium 1 % Gel  Commonly known as: VOLTAREN     docusate 100 MG Caps  Commonly known as: COLACE, DULCOLAX  Take 100 mg by mouth daily     gabapentin 100 MG capsule  Commonly known as: NEURONTIN     melatonin 10 MG Caps capsule     metoprolol tartrate 25 MG tablet  Commonly known as: LOPRESSOR     mupirocin 2 % ointment  Commonly known as: BACTROBAN     nitroGLYCERIN 0.4 MG SL tablet  Commonly known as: NITROSTAT     ondansetron 4 MG disintegrating tablet  Commonly known as: Zofran ODT  Take 1 tablet by mouth every 8 hours as needed for Nausea     polyethylene glycol 17 g packet  Commonly known as: GLYCOLAX  Take 17 g by mouth daily as needed for Constipation     pravastatin 40 MG tablet  Commonly known as: PRAVACHOL               Where to Get Your Medications        These medications were sent to [id number] - [phone], [place] [address]      Phone: [phone]   apixaban 2.5 MG Tabs tablet  sulfamethoxazole-trimethoprim 400-80 MG per tablet         Disposition:   Discharged to [facility]. Any [facility] needs that were indicated and/or required as been addressed and set up by Social Work. Condition at discharge: Pt was medically stable at the time of discharge. Significant improvement in clinical condition compared to initial condition at presentation to hospital    Activity: activity as tolerated, fall precautions. Total time taken for discharging this patient: 40 minutes. Greater than 70% of time was spent focused exclusively on this patient. Time was taken to review chart, discuss plans with consultants, reconciling medications, discussing plan answering questions with patient. Signed:  Solis Hernandez DO  11/30/2022, 1:07 PM  ----------------------------------------------------------------------------------------------------------------------    Noemi Marrero,     Please return to ER or call 911 if you develop any significant signs or symptoms. I may not have addressed all of your medical illnesses or the abnormal blood work or imaging therefore please ask your PCP Jass Blackmon PA-C and other out patient specialists and providers  to obtain [facility] record entirely to follow up on all of the abnormal labs, imaging and findings that I have and have not addressed during your hospitalization. Discharging you from the hospital does not mean that your medical care ends here and now. You may still need additional work up, investigation, monitoring, and treatment to be handled from this point on by outside providers including your PCP, Verona Campos PA-C , Specialists and other healthcare providers. Please review your list of discharge medications prior to resuming medications you might still have at home, as the medications you need to be taking, dosages or how often you must take them may have changed. For medication questions, contact your retail pharmacy and your PCP, Verona Campos PA-C .     ** I STRONGLY RECOMMEND that you follow up with Verona Campos PA-C within 3 to 5 days for a post hospitalization evaluation. This specific office visit is covered by your insurance, and is not the same as your annual doctor visit/ check up. This office visit is important, as it may prevent need for repeat and/or future hospitalizations. **    Your medical team at [facility] appreciates the opportunity to work with you to get well!     Sincerely,  Troy Pompa, DO

## 2022-11-30 NOTE — DISCHARGE INSTR - COC
Continuity of Care Form    Patient Name: Ashley Ayoub   :  1935  MRN:  [id number]    Admit date:  2022  Discharge date:  2022    Code Status Order: Full Code   Advance Directives:     Admitting Physician:  Azael Santoro DO  PCP: Sumaya Domínguez PA-C    Discharging Nurse: [facility] TATTNALL Reuel Boeck Unit/Room#: N348/G359-26  Discharging Unit Phone Number: [phone]    Emergency Contact:   Extended Emergency Contact Information  Primary Emergency Contact: Renea Dumont Phone: [phone]  Relation: Child  Preferred language: English   needed?  No  Secondary Emergency Contact: Akash Chinchilla  Address: [address] Phone: [phone]  Relation: Spouse    Past Surgical History:  Past Surgical History:   Procedure Laterality Date    ABDOMEN SURGERY      large intestine partial removal    APPENDECTOMY      CHOLECYSTECTOMY      HYSTERECTOMY (CERVIX STATUS UNKNOWN)      LAPAROTOMY N/A 2022    EXPLORATORY LAPAROTOMY LYSIS OF ADHESIONS performed by Gage Perkins MD at [facility]       Immunization History:   Immunization History   Administered Date(s) Administered    COVID-19, MODERNA BLUE border, Primary or Immunocompromised, (age 12y+), IM, 100 mcg/0.5mL 2021       Active Problems:  Patient Active Problem List   Diagnosis Code    Nausea vomiting and diarrhea R11.2, R19.7    SBO (small bowel obstruction) ([facility] [address] K56.609    Atypical chest pain R07.89    History of hypertrophic cardiomyopathy Z86.79    History of PSVT (paroxysmal supraventricular tachycardia) Z86.79    Abnormal EKG R94.31    Hypertension I10    UTI (urinary tract infection) N39.0    JOAQUINA (acute kidney injury) ([facility] [address] N17.9    Persistent atrial fibrillation (HCC) I48.19       Isolation/Infection:   Isolation            Droplet Plus          Patient Infection Status       Infection Onset Added Last Indicated Last Indicated By Review Planned Expiration Resolved Resolved By    None active    Resolved    COVID-19 22 COVID-19, Rapid   22 Lourdes Krishna RN    COVID-19 (Rule Out) 22 COVID-19, Rapid (Ordered)   22 Rule-Out Test Resulted    COVID-19 22 COVID-19, Rapid   22             Nurse Assessment:  Last Vital Signs: BP (!) 141/66   Pulse 92   Temp 97.5 °F (36.4 °C) (Oral)   Resp 16   Ht 5' 2\" (1.575 m)   Wt 134 lb (60.8 kg)   SpO2 98%   BMI 24.51 kg/m²     Last documented pain score (0-10 scale): Pain Level: 1  Last Weight:   Wt Readings from Last 1 Encounters:   22 134 lb (60.8 kg)     Mental Status:  oriented and alert    IV Access:  - None    Nursing Mobility/ADLs:  Walking   Assisted  Transfer  Assisted  Bathing  Assisted  Dressing  Assisted  Toileting  Assisted  Feeding  Independent  Med Admin  Assisted  Med Delivery   whole    Wound Care Documentation and Therapy:  Incision 22 Abdomen Lower;Medial (Active)   Dressing Status Other (Comment) 22 105   Dressing/Treatment Open to air 22   Incision Assessment Dry 22 105   Drainage Amount None 22   Odor None 22   Juli-incision Assessment Intact; Warm;Other (Comment) 22 1059   Number of days: 22        Elimination:  Continence: Bowel: No  Bladder: No  Urinary Catheter: None   Colostomy/Ileostomy/Ileal Conduit: No       Date of Last BM: 2022    Intake/Output Summary (Last 24 hours) at 2022 1051  Last data filed at 2022 1005  Gross per 24 hour   Intake 470 ml   Output 1800 ml   Net -1330 ml     I/O last 3 completed shifts: In: 360 [P.O.:300; I.V.:60]  Out: 1300 [Urine:1300]    Safety Concerns:      At Risk for Falls    Impairments/Disabilities:      Vision and Hearing    Nutrition Therapy:  Current Nutrition Therapy:   - Oral Diet:  General    Routes of Feeding: Oral  Liquids: No Restrictions  Daily Fluid Restriction: no  Last Modified Barium Swallow with Video (Video Swallowing Test): not done    Treatments at the Time of Hospital Discharge:   Respiratory Treatments: N/A  Oxygen Therapy:  is not on home oxygen therapy. Ventilator:    - No ventilator support    Rehab Therapies: Physical Therapy and Occupational Therapy  Weight Bearing Status/Restrictions: No weight bearing restrictions  Other Medical Equipment (for information only, NOT a DME order):  walker  Other Treatments: N/A    Patient's personal belongings (please select all that are sent with patient):  Glasses, Hearing Aids    RN SIGNATURE:  Electronically signed by Farheen Ramos RN on 11/30/22 at 2:26 PM EST    CASE MANAGEMENT/SOCIAL WORK SECTION    Inpatient Status Date: ***    Readmission Risk Assessment Score:  Readmission Risk              Risk of Unplanned Readmission:  0           Discharging to Facility/ Agency   Name: [facility]  Address:  Phone: [phone]  Fax:    Dialysis Facility (if applicable)   Name:  Address:  Dialysis Schedule:  Phone:  Fax:    / signature: Electronically signed by KWADWO Schneider LSW on 11/30/22 at 10:54 AM EST    PHYSICIAN SECTION    Prognosis: Good    Condition at Discharge: Stable    Rehab Potential (if transferring to Rehab): Good    Recommended Labs or Other Treatments After Discharge: Follow with PCP    Physician Certification: I certify the above information and transfer of Simón Rashid  is necessary for the continuing treatment of the diagnosis listed and that she requires [facility] for greater 30 days.      Update Admission H&P: No change in H&P    PHYSICIAN SIGNATURE:  Electronically signed by Mejia Angeles DO on 11/30/22 at 11:21 AM EST

## 2022-11-30 NOTE — PROGRESS NOTES
Pt remains in droplet precautions for + COVID. Pt turned and repositioned frequently. Meds given per orders. Lungs clear diminished. ABD soft non tender. Healing ABD incision noted. Les Pimple alert and oriented. Vitals stable. Hourly rounds continue. Call light in reach.

## 2022-11-30 NOTE — PROGRESS NOTES
0750: Assessment complete. VSS on RA. Pt is on droplet plus precautions for COVID. Patient alert, oriented, calm, and cooperative. Morning med pass complete. PRN potassium replacement given for lab results of 3.4 on 11/29/2022. ABD soft, surgical incision appears to be healing well. Pt repositioned in bed with pillow support. Safety maintained, call light within reach. No complaints. Morning K+ lab results for 11/30/2022 4.4.       Electronically signed by Leann Higginbotham RN on 11/30/22 at 10:28 AM EST

## 2022-12-01 ENCOUNTER — OFFICE VISIT (OUTPATIENT)
Dept: GERIATRIC MEDICINE | Age: 87
End: 2022-12-01

## 2022-12-01 DIAGNOSIS — R53.1 WEAKNESS: ICD-10-CM

## 2022-12-01 DIAGNOSIS — I10 PRIMARY HYPERTENSION: ICD-10-CM

## 2022-12-01 DIAGNOSIS — I48.19 PERSISTENT ATRIAL FIBRILLATION (HCC): Primary | ICD-10-CM

## 2022-12-01 DIAGNOSIS — G62.9 NEUROPATHY: ICD-10-CM

## 2022-12-01 NOTE — PROGRESS NOTES
Physical Therapy  Facility/Department: [facility] MED SURG [id number]/[id number]  Physical Therapy Discharge      NAME: Sera Powell    : 1935 (80 y.o.)  MRN: [id number]    Account: [de-identified]  Gender: female      Patient has been discharged from acute care hospital. DC patient from current PT program.      Electronically signed by Ceasar Herrera PT on 22 at 8:25 AM EST

## 2022-12-02 LAB
BLOOD CULTURE, ROUTINE: NORMAL
BLOOD CULTURE, ROUTINE: NORMAL

## 2022-12-09 ENCOUNTER — OFFICE VISIT (OUTPATIENT)
Dept: GERIATRIC MEDICINE | Age: 87
End: 2022-12-09
Payer: MEDICARE

## 2022-12-09 DIAGNOSIS — Z87.19 HX SBO: Primary | ICD-10-CM

## 2022-12-09 DIAGNOSIS — I48.19 PERSISTENT ATRIAL FIBRILLATION (HCC): ICD-10-CM

## 2022-12-09 PROCEDURE — 99316 NF DSCHRG MGMT 30 MIN+: CPT | Performed by: PHYSICIAN ASSISTANT

## 2022-12-11 NOTE — PROGRESS NOTES
Patient Name: Bell Cody    YOB: 1935  Medical Record Number: [id number]    History of Present Illness: This 66-year-old woman was admitted here after recent hospitalization. Patient has had abdominal discomfort patient had significant adhesions have evidence of small bowel obstruction patient did undergo valvulitis surgical services did undergo adhesional lysis. Patient was stabilized patient did make gains she was subsidy transferred here for course of functional stability patient has been COVID-19 positive at time of discharge patient was stabilized transferred here for ongoing course of care. Seen in her room today ongoing intermittent discomfort no drainage or discharge from her surgical site. Globally weak at this time. Review of Systems   Constitutional:  Positive for fatigue. Negative for appetite change. HENT:  Negative for congestion. Respiratory:  Positive for cough and shortness of breath. Cardiovascular:  Positive for leg swelling. Negative for chest pain. Gastrointestinal:  Positive for constipation. Musculoskeletal:  Positive for arthralgias and back pain. Skin:  Negative for pallor. Neurological:  Positive for weakness. All other systems reviewed and are negative.     Review of Systems: All 14 review of systems negative other than as stated above    Social History     Tobacco Use    Smoking status: Never    Smokeless tobacco: Never   Substance Use Topics    Alcohol use: No    Drug use: No         Past Medical History:   Diagnosis Date    Cancer ([facility] [address] 1999    bladder    Hyperlipidemia     Hypertension            Past Surgical History:   Procedure Laterality Date    ABDOMEN SURGERY      large intestine partial removal    APPENDECTOMY      CHOLECYSTECTOMY      HYSTERECTOMY (CERVIX STATUS UNKNOWN)      LAPAROTOMY N/A 11/7/2022    EXPLORATORY LAPAROTOMY LYSIS OF ADHESIONS performed by Jayde Parekh MD at [facility]         Current Outpatient Medications on File Prior to Visit   Medication Sig Dispense Refill    docusate sodium (COLACE, DULCOLAX) 100 MG CAPS Take 100 mg by mouth daily (Patient taking differently: Take 100 mg by mouth daily Indications: Constipation)      aspirin 81 MG EC tablet Take 81 mg by mouth daily Indications: Preventative Treatment      amLODIPine (NORVASC) 2.5 MG tablet Take 2.5 mg by mouth daily Indications: High Blood Pressure Disorder      diclofenac sodium (VOLTAREN) 1 % GEL Apply 2 g topically 4 times daily Indications: Pain associated with Arthritis To knees      melatonin 10 MG CAPS capsule Take 10 mg by mouth nightly Indications: Trouble Sleeping      mupirocin (BACTROBAN) 2 % ointment apply to the affected area of the left nasal passage three times daily for 10 days as directed      nitroGLYCERIN (NITROSTAT) 0.4 MG SL tablet Place 0.4 mg under the tongue every 5 minutes as needed for Chest pain      ondansetron (ZOFRAN ODT) 4 MG disintegrating tablet Take 1 tablet by mouth every 8 hours as needed for Nausea 3 tablet 0    Cholecalciferol 2000 units CAPS Take 2,000 Units by mouth every morning Indications: Nutritional Support      gabapentin (NEURONTIN) 100 MG capsule Take 200 mg by mouth every evening. Indications: Nerve Inflammation with Pain Patient states she is supposed to take 100 mg three times a day, but will only take 200 mg at bed time      metoprolol tartrate (LOPRESSOR) 25 MG tablet Take 25 mg by mouth 2 times daily Indications: High Blood Pressure Disorder      pravastatin (PRAVACHOL) 40 MG tablet Take 40 mg by mouth nightly Indications: High Amount of Fats in the Blood       No current facility-administered medications on file prior to visit. Allergies   Allergen Reactions    Phenazopyridine Nausea And Vomiting     Other reaction(s): GI Upset         Family History   Problem Relation Age of Onset    Coronary Art Dis Brother          Physical Exam:      Physical Exam  Vitals and nursing note reviewed.    Constitutional: Appearance: Normal appearance. She is normal weight. HENT:      Head: Normocephalic and atraumatic. Nose: Nose normal.      Mouth/Throat:      Mouth: Mucous membranes are moist.   Eyes:      Extraocular Movements: Extraocular movements intact. Cardiovascular:      Rate and Rhythm: Normal rate and regular rhythm. Pulmonary:      Effort: Pulmonary effort is normal.      Breath sounds: No rhonchi. Abdominal:      Palpations: Abdomen is soft. Tenderness: There is abdominal tenderness. Comments: For scopic sites intact laparoscopic site   Musculoskeletal:         General: Swelling present. Normal range of motion. Cervical back: Neck supple. Skin:     General: Skin is warm. Neurological:      Motor: Weakness present. Psychiatric:         Mood and Affect: Mood normal.       There were no vitals taken for this visit. .   Lab Results   Component Value Date    WBC 6.2 11/27/2022    HGB 11.1 (L) 11/27/2022    HCT 33.4 (L) 11/27/2022    MCV 94.9 (H) 11/27/2022     11/27/2022     Lab Results   Component Value Date/Time     11/30/2022 06:13 AM    K 4.4 11/30/2022 06:13 AM     11/30/2022 06:13 AM    CO2 24 11/30/2022 06:13 AM    BUN 4 11/30/2022 06:13 AM    CREATININE 0.31 11/30/2022 06:13 AM    GLUCOSE 102 11/30/2022 06:13 AM    CALCIUM 8.6 11/30/2022 06:13 AM                ASSESSMENT:  Patient Active Problem List   Diagnosis    Nausea vomiting and diarrhea    SBO (small bowel obstruction) ([facility])    Atypical chest pain    History of hypertrophic cardiomyopathy    History of PSVT (paroxysmal supraventricular tachycardia)    Abnormal EKG    Hypertension    UTI (urinary tract infection)    JOAQUINA (acute kidney injury) ([address]    Persistent atrial fibrillation ([facility])         PLAN:   Diagnosis Orders   1. SBO (small bowel obstruction) ([facility])        2. Persistent atrial fibrillation ([address]        3. Primary hypertension        4.  Weakness          Continue surgical site care course of

## 2022-12-13 ASSESSMENT — ENCOUNTER SYMPTOMS
COUGH: 0
ABDOMINAL PAIN: 0
SHORTNESS OF BREATH: 0
ABDOMINAL DISTENTION: 1
NAUSEA: 0
CONSTIPATION: 0
VOMITING: 0
COLOR CHANGE: 0
DIARRHEA: 0
BACK PAIN: 1

## 2022-12-14 NOTE — PROGRESS NOTES
Subjective:      Patient ID: Adam Oviedo is a pleasant 80 y.o. female who presents today for:  No chief complaint on file. [facility]    Patient seen for follow-up status post surgery for SBO with bowel adhesions. Has made good recovery overall. No new evidence of changes at surgical sites. No new drainage, erythema, or pain reported. Patient last bowel movement on 11/21/2022. Currently has bowel protocol in place, see orders. Denies any new issues. Overall lethargic, increased fatigue. We will continue to monitor if persists. No new changes otherwise the patient order set. Overall appetite is good, urinating well with good p.o. fluid intake. Vital signs are stable. As of 11/19/2022, VS: 139/74, 70 bpm, 18 RR, 90% SPO2 on room air, WT = 134.2 LBS.     Patient Active Problem List   Diagnosis    Nausea vomiting and diarrhea    SBO (small bowel obstruction) (HCC)    Atypical chest pain    History of hypertrophic cardiomyopathy    History of PSVT (paroxysmal supraventricular tachycardia)    Abnormal EKG    Hypertension    UTI (urinary tract infection)    JOAQUINA (acute kidney injury) ([facility] [address]    Persistent atrial fibrillation (HCC)     Past Medical History:   Diagnosis Date    Cancer ([address] 1999    bladder    Hyperlipidemia     Hypertension      Past Surgical History:   Procedure Laterality Date    ABDOMEN SURGERY      large intestine partial removal    APPENDECTOMY      CHOLECYSTECTOMY      HYSTERECTOMY (CERVIX STATUS UNKNOWN)      LAPAROTOMY N/A 11/7/2022    EXPLORATORY LAPAROTOMY LYSIS OF ADHESIONS performed by Solis Camarillo MD at [address] [place] [address] History    Marital status:      Spouse name: Not on file    Number of children: Not on file    Years of education: Not on file    Highest education level: Not on file   Occupational History    Not on file   Tobacco Use    Smoking status: Never    Smokeless tobacco: Never   Substance and Sexual Activity    Alcohol use: No    Drug use: No    Sexual activity: Not on file   Other Topics Concern    Not on file   Social History Narrative    Not on file     Social Determinants of Health     Financial Resource Strain: Not on file   Food Insecurity: Not on file   Transportation Needs: Not on file   Physical Activity: Not on file   Stress: Not on file   Social Connections: Not on file   Intimate Partner Violence: Not on file   Housing Stability: Not on file     Family History   Problem Relation Age of Onset    Coronary Art Dis Brother      Allergies   Allergen Reactions    Phenazopyridine Nausea And Vomiting     Other reaction(s): GI Upset         Review of Systems   Constitutional:  Positive for appetite change (Patient states decreased, however improving per staff.) and fatigue. Negative for activity change, fever and unexpected weight change. Respiratory:  Negative for cough and shortness of breath. Cardiovascular:  Positive for leg swelling. Negative for chest pain and palpitations. Gastrointestinal:  Positive for abdominal distention. Negative for abdominal pain, constipation, diarrhea, nausea and vomiting. Genitourinary:  Negative for decreased urine volume and dysuria. Musculoskeletal:  Positive for back pain. Negative for neck pain and neck stiffness. Skin:  Negative for color change. Neurological:  Positive for weakness. Psychiatric/Behavioral:  Negative for agitation, behavioral problems, confusion, decreased concentration, dysphoric mood and hallucinations. The patient is not nervous/anxious. All other systems reviewed and are negative. Objective:   VS: See [facility]. Reviewed. Physical Exam  Constitutional:       General: She is not in acute distress. Appearance: Normal appearance. She is normal weight. She is not ill-appearing. HENT:      Head: Normocephalic and atraumatic. Mouth/Throat:      Mouth: Mucous membranes are moist.      Pharynx: Oropharynx is clear.    Eyes:      General: No scleral icterus. Conjunctiva/sclera: Conjunctivae normal.   Cardiovascular:      Rate and Rhythm: Normal rate and regular rhythm. Pulmonary:      Effort: Pulmonary effort is normal.      Breath sounds: Normal breath sounds. Abdominal:      General: Bowel sounds are normal. There is distension. Palpations: Abdomen is soft. There is no mass. Tenderness: There is abdominal tenderness (s/p surgery). There is no guarding. Skin:     General: Skin is warm and dry. Neurological:      Mental Status: She is alert and oriented to person, place, and time. Mental status is at baseline. Motor: Weakness present. Psychiatric:         Mood and Affect: Mood normal.         Behavior: Behavior normal.       Assessment:       Diagnosis Orders   1. Hx SBO        2. Small bowel obstruction due to adhesions [facility])              Plan:      Continue current care parameters. Monitor for bowel movements. Monitor vital signs routinely. Stable today. Denies any new discomfort or pain. Follow-up for any issues as needed. Continue PT/OT. Monitor labs PRN. No follow-ups on file. Jair Odom, [id number]        Please note orders entered on site at facility after discussion with appropriate facility nursing/therapy/ / nutritional staff. Current longstanding medical problems and acute medical issues addressed with staff. Available data and data elements in on site paper chart reviewed and analyzed. Current external consultant notes reviewed in on site chart. Ordered laboratory testing and imaging will be reviewed when available. This patient's need for psychiatric medication has been reviewed. Will consider further adjustment and possible further evaluations by mental health services. Side effects, adverse effects of the medication prescribed today, as well as treatment plan and result expectations have been discussed withthe patient who expresses understanding and desires to proceed.

## 2022-12-15 NOTE — PROGRESS NOTES
SUBJECTIVE:  Patient seen for follow-up after recent admission patient has intra-abdominal discomfort has been coughing currently nonproductive sputum. Patient is globally weak at this time. ROS: Chest palpitation has been coughing  The rest of the 14 point ROS negative    PHYSICAL EXAM: VSS per facility record  And orient x2 pupils reactive oral mucosa moist chest with coarse breath sounds cardiovascular showed a regular rate abdomen soft incisional sites are intact positive bowel sounds SOFIA    ASSESSMENT & PLAN:   Diagnosis Orders   1. SBO (small bowel obstruction) ([facility] [address]        2. COVID-19          Continue with regular treatments has completed course isolation course of physical therapy outpatient therapy.   Local abdominal careful monitoring of bowel output            Past Medical History:   Diagnosis Date    Cancer ([facility] [address] 1999    bladder    Hyperlipidemia     Hypertension          Past Surgical History:   Procedure Laterality Date    ABDOMEN SURGERY      large intestine partial removal    APPENDECTOMY      CHOLECYSTECTOMY      HYSTERECTOMY (CERVIX STATUS UNKNOWN)      LAPAROTOMY N/A 11/7/2022    EXPLORATORY LAPAROTOMY LYSIS OF ADHESIONS performed by Gage Perkins MD at [facility]         Current Outpatient Medications on File Prior to Visit   Medication Sig Dispense Refill    docusate sodium (COLACE, DULCOLAX) 100 MG CAPS Take 100 mg by mouth daily (Patient taking differently: Take 100 mg by mouth daily Indications: Constipation)      aspirin 81 MG EC tablet Take 81 mg by mouth daily Indications: Preventative Treatment      amLODIPine (NORVASC) 2.5 MG tablet Take 2.5 mg by mouth daily Indications: High Blood Pressure Disorder      diclofenac sodium (VOLTAREN) 1 % GEL Apply 2 g topically 4 times daily Indications: Pain associated with Arthritis To knees      melatonin 10 MG CAPS capsule Take 10 mg by mouth nightly Indications: Trouble Sleeping      mupirocin (BACTROBAN) 2 % ointment apply to the affected area of the left nasal passage three times daily for 10 days as directed      nitroGLYCERIN (NITROSTAT) 0.4 MG SL tablet Place 0.4 mg under the tongue every 5 minutes as needed for Chest pain      ondansetron (ZOFRAN ODT) 4 MG disintegrating tablet Take 1 tablet by mouth every 8 hours as needed for Nausea 3 tablet 0    Cholecalciferol 2000 units CAPS Take 2,000 Units by mouth every morning Indications: Nutritional Support      gabapentin (NEURONTIN) 100 MG capsule Take 200 mg by mouth every evening. Indications: Nerve Inflammation with Pain Patient states she is supposed to take 100 mg three times a day, but will only take 200 mg at bed time      metoprolol tartrate (LOPRESSOR) 25 MG tablet Take 25 mg by mouth 2 times daily Indications: High Blood Pressure Disorder      pravastatin (PRAVACHOL) 40 MG tablet Take 40 mg by mouth nightly Indications: High Amount of Fats in the Blood       No current facility-administered medications on file prior to visit.          Family History   Problem Relation Age of Onset    Coronary Art Dis Brother        Social History     Socioeconomic History    Marital status:      Spouse name: Not on file    Number of children: Not on file    Years of education: Not on file    Highest education level: Not on file   Occupational History    Not on file   Tobacco Use    Smoking status: Never    Smokeless tobacco: Never   Substance and Sexual Activity    Alcohol use: No    Drug use: No    Sexual activity: Not on file   Other Topics Concern    Not on file   Social History Narrative    Not on file     Social Determinants of Health     Financial Resource Strain: Not on file   Food Insecurity: Not on file   Transportation Needs: Not on file   Physical Activity: Not on file   Stress: Not on file   Social Connections: Not on file   Intimate Partner Violence: Not on file   Housing Stability: Not on file         No results found for: LABA1C  No results found for: [employer]    Lab Results   Component Value Date/Time     11/30/2022 06:13 AM    K 4.4 11/30/2022 06:13 AM     11/30/2022 06:13 AM    CO2 24 11/30/2022 06:13 AM    BUN 4 11/30/2022 06:13 AM    CREATININE 0.31 11/30/2022 06:13 AM    GLUCOSE 102 11/30/2022 06:13 AM    CALCIUM 8.6 11/30/2022 06:13 AM        No results found for: CHOL  No results found for: TRIG  No results found for: HDL  No results found for: LDLCHOLESTEROL, LDLCALC  No results found for: LABVLDL, VLDL  No results found for: CHOLHDLRATIO    No results found for: TSHFT4, TSH    Lab Results   Component Value Date    WBC 6.2 11/27/2022    HGB 11.1 (L) 11/27/2022    HCT 33.4 (L) 11/27/2022    MCV 94.9 (H) 11/27/2022     11/27/2022       Please note orders entered on site at facility after discussion with appropriate facility nursing/therapy/ / nutritional staff. Current longstanding medical problems and acute medical issues addressed with staff. Available data and data elements in on site paper chart reviewed and analyzed. Current external consultant notes reviewed in on site chart. Ordered laboratory testing and imaging will be reviewed when available.

## 2022-12-18 ASSESSMENT — ENCOUNTER SYMPTOMS
BACK PAIN: 1
SHORTNESS OF BREATH: 1
COUGH: 1
CONSTIPATION: 1

## 2022-12-24 ENCOUNTER — APPOINTMENT (OUTPATIENT)
Dept: GENERAL RADIOLOGY | Age: 87
End: 2022-12-24
Payer: MEDICARE

## 2022-12-24 ENCOUNTER — HOSPITAL ENCOUNTER (EMERGENCY)
Age: 87
Discharge: HOME | End: 2022-12-24
Payer: MEDICARE

## 2022-12-24 VITALS
OXYGEN SATURATION: 100 % | SYSTOLIC BLOOD PRESSURE: 119 MMHG | RESPIRATION RATE: 18 BRPM | BODY MASS INDEX: 23.92 KG/M2 | HEIGHT: 62 IN | TEMPERATURE: 97.6 F | HEART RATE: 95 BPM | DIASTOLIC BLOOD PRESSURE: 80 MMHG | WEIGHT: 130 LBS

## 2022-12-24 DIAGNOSIS — S60.222A CONTUSION OF LEFT HAND, INITIAL ENCOUNTER: Primary | ICD-10-CM

## 2022-12-24 PROCEDURE — 99283 EMERGENCY DEPT VISIT LOW MDM: CPT

## 2022-12-24 PROCEDURE — 73130 X-RAY EXAM OF HAND: CPT

## 2022-12-24 ASSESSMENT — ENCOUNTER SYMPTOMS
ABDOMINAL PAIN: 0
NAUSEA: 0
COUGH: 0
CHOKING: 0
DIARRHEA: 0
VOMITING: 0
RHINORRHEA: 0
ABDOMINAL DISTENTION: 0
SHORTNESS OF BREATH: 0
SORE THROAT: 0

## 2022-12-24 ASSESSMENT — PAIN SCALES - GENERAL: PAINLEVEL_OUTOF10: 8

## 2022-12-24 ASSESSMENT — PAIN DESCRIPTION - PAIN TYPE: TYPE: ACUTE PAIN

## 2022-12-24 ASSESSMENT — PAIN - FUNCTIONAL ASSESSMENT: PAIN_FUNCTIONAL_ASSESSMENT: 0-10

## 2022-12-24 ASSESSMENT — PAIN DESCRIPTION - ORIENTATION: ORIENTATION: LEFT

## 2022-12-24 ASSESSMENT — PAIN DESCRIPTION - LOCATION: LOCATION: HAND

## 2022-12-24 ASSESSMENT — PAIN DESCRIPTION - DESCRIPTORS: DESCRIPTORS: THROBBING

## 2022-12-24 ASSESSMENT — PAIN DESCRIPTION - ONSET: ONSET: ON-GOING

## 2022-12-24 ASSESSMENT — PAIN DESCRIPTION - FREQUENCY: FREQUENCY: CONTINUOUS

## 2022-12-24 NOTE — ED PROVIDER NOTES
[address] [facility] ED  eMERGENCYdEPARTMENT eNCOUnter      Pt Name: Erika Lombardo  MRN: [id number]  [place] 1935  Date of evaluation: 12/24/2022  Abbie Ayala PA-C    CHIEF COMPLAINT           HPI  Erika Lombardo is a 80 y.o. female per chart review has a h/o SVT, JOAQUINA, A. fib presents to the ED with complaints of abrupt onset of constant, moderate, aching left hand pain after injury today. Patient states her hand was in a mixing bowl and her  accidentally turned on the electric mixer. It hit her hand externally. There is no wound or laceration. No bleeding. Skin is intact. She reports bruising and left hand pain. Denies any other injury. Not on any blood thinners. She ambulates without any issues, tolerating p.o.    ROS  Review of Systems   Constitutional:  Negative for chills, fatigue and fever. HENT:  Negative for congestion, rhinorrhea, sneezing and sore throat. Respiratory:  Negative for cough, choking and shortness of breath. Cardiovascular:  Negative for chest pain. Gastrointestinal:  Negative for abdominal distention, abdominal pain, diarrhea, nausea and vomiting. Genitourinary:  Negative for dysuria, flank pain, hematuria and urgency. Musculoskeletal:  Positive for myalgias. Skin:  Positive for wound. Except as noted above the remainder of the review of systems was reviewed and negative.        PAST MEDICAL HISTORY     Past Medical History:   Diagnosis Date    Cancer [facility]) 1999    bladder    Hyperlipidemia     Hypertension          SURGICAL HISTORY       Past Surgical History:   Procedure Laterality Date    ABDOMEN SURGERY      large intestine partial removal    APPENDECTOMY      CHOLECYSTECTOMY      HYSTERECTOMY (CERVIX STATUS UNKNOWN)      LAPAROTOMY N/A 11/7/2022    EXPLORATORY LAPAROTOMY LYSIS OF ADHESIONS performed by Truett Burkitt, MD at [facility]       Previous Medications    AMLODIPINE (NORVASC) 2.5 MG TABLET Take 2.5 mg by mouth daily Indications: High Blood Pressure Disorder    APIXABAN (ELIQUIS) 2.5 MG TABS TABLET    Take 1 tablet by mouth 2 times daily    ASPIRIN 81 MG EC TABLET    Take 81 mg by mouth daily Indications: Preventative Treatment    CHOLECALCIFEROL 2000 UNITS CAPS    Take 2,000 Units by mouth every morning Indications: Nutritional Support    DICLOFENAC SODIUM (VOLTAREN) 1 % GEL    Apply 2 g topically 4 times daily Indications: Pain associated with Arthritis To knees    DOCUSATE SODIUM (COLACE, DULCOLAX) 100 MG CAPS    Take 100 mg by mouth daily    GABAPENTIN (NEURONTIN) 100 MG CAPSULE    Take 200 mg by mouth every evening.  Indications: Nerve Inflammation with Pain Patient states she is supposed to take 100 mg three times a day, but will only take 200 mg at bed time    MELATONIN 10 MG CAPS CAPSULE    Take 10 mg by mouth nightly Indications: Trouble Sleeping    METOPROLOL TARTRATE (LOPRESSOR) 25 MG TABLET    Take 25 mg by mouth 2 times daily Indications: High Blood Pressure Disorder    MUPIROCIN (BACTROBAN) 2 % OINTMENT    apply to the affected area of the left nasal passage three times daily for 10 days as directed    NITROGLYCERIN (NITROSTAT) 0.4 MG SL TABLET    Place 0.4 mg under the tongue every 5 minutes as needed for Chest pain    ONDANSETRON (ZOFRAN ODT) 4 MG DISINTEGRATING TABLET    Take 1 tablet by mouth every 8 hours as needed for Nausea    PRAVASTATIN (PRAVACHOL) 40 MG TABLET    Take 40 mg by mouth nightly Indications: High Amount of Fats in the Blood       ALLERGIES     Phenazopyridine    FAMILY HISTORY       Family History   Problem Relation Age of Onset    Coronary Art Dis Brother           SOCIAL HISTORY       Social History     Socioeconomic History    Marital status:    Tobacco Use    Smoking status: Never    Smokeless tobacco: Never   Substance and Sexual Activity    Alcohol use: No    Drug use: No         PHYSICAL EXAM       ED Triage Vitals [12/24/22 1223]   BP Temp Temp Source Heart Rate Resp SpO2 Height Weight   119/80 97.6 °F (36.4 °C) Oral 95 18 100 % 5' 2\" (1.575 m) 130 lb (59 kg)       Physical Exam  Constitutional:       General: She is not in acute distress. Appearance: Normal appearance. She is not ill-appearing. HENT:      Head: Normocephalic and atraumatic. Right Ear: Ear canal and external ear normal.      Left Ear: Ear canal and external ear normal.      Nose: Nose normal.      Mouth/Throat:      Mouth: Mucous membranes are moist.      Pharynx: Oropharynx is clear. Eyes:      Extraocular Movements: Extraocular movements intact. Conjunctiva/sclera: Conjunctivae normal.      Pupils: Pupils are equal, round, and reactive to light. Cardiovascular:      Rate and Rhythm: Normal rate and regular rhythm. Pulmonary:      Effort: Pulmonary effort is normal. No respiratory distress. Breath sounds: Normal breath sounds. Abdominal:      General: Abdomen is flat. Bowel sounds are normal. There is no distension. Tenderness: There is no abdominal tenderness. There is no right CVA tenderness, left CVA tenderness, guarding or rebound. Musculoskeletal:         General: Normal range of motion. Right hand: Normal.      Left hand: Tenderness present. Cervical back: Normal range of motion. Comments: Ecchymosis noted on dorsal side of the left hand below second and third fingers. No obvious deformity, swelling, overlying erythema. There is no open laceration or abrasions. Patient has decreased range of motion secondary to pain. Pulses 2+. Sensation is intact, neurovascularly intact. No wrist pain. Skin:     General: Skin is warm and dry. Capillary Refill: Capillary refill takes 2 to 3 seconds. Neurological:      General: No focal deficit present. Mental Status: She is alert and oriented to person, place, and time. Mental status is at baseline. Cranial Nerves: No cranial nerve deficit. Motor: No weakness.       Gait: Gait normal.         MDM    49-year-old female presents to the ED with left hand pain after injury today. She was using a mixing bowl with an electric mixer and got her hand caught in it, states it hit her hand. No wound or laceration. She is afebrile with a temp of 97.6, hemodynamically stable. Tolerating p.o., able to ambulate without any issues. There is no obvious deformity on exam.  Dorsal side of the left hand has ecchymosis. There are no abrasions or lacerations. No bleeding. Decreased ROM 2/2 pain. X-ray of the left hand shows no acute bony abnormality. Negative for fractures. Discussed work-up and imaging with the patient, educated the patient on contusions of the hand. She will use Tylenol at home for pain relief. She will use RICE method as well. Given warning precautions and when to return to the ED. She understands and agrees with this plan and is without questions. FINAL IMPRESSION      1.  Contusion of left hand, initial encounter          DISPOSITION/PLAN   DISPOSITION Decision To Discharge 12/24/2022 01:04:44 PM        DISCHARGE MEDICATIONS:  [unfilled]         Tom Jaime PA-C(electronically signed)  Attending Emergency Physician           Tom Jaime PA-C  12/24/22 [id number]

## 2022-12-24 NOTE — DISCHARGE INSTRUCTIONS
Use Tylenol at home for pain management    Ice the hand to decrease swelling, 20 minutes on, 20 minutes off, 3 times a day as needed

## 2022-12-27 PROBLEM — N39.0 UTI (URINARY TRACT INFECTION): Status: RESOLVED | Noted: 2022-11-27 | Resolved: 2022-12-27

## 2022-12-29 NOTE — PROGRESS NOTES
Patient Name: Bigg Paez    YOB: 1935  Medical Record Number: [id number]      History of Present Illness: This 49-year-old woman was admitted here after recent hospitalization patient did have evidence of abdominal pain has recent bowel obstruction status post laparotomy adhesional lysis patient did have subsequent constipation readmitted to the hospital patient did undergo bowel monitoring patient did have improved bowel output simply transferred for ongoing course of care. Patient arrives positive for COVID-19. The patient has had prior COVID positive recent testing. Patient is globally weak at this time without any acute abdominal pain at this time. Review of Systems   Constitutional:  Positive for fatigue. Negative for appetite change. HENT:  Negative for congestion. Respiratory:  Positive for cough and shortness of breath. Cardiovascular:  Negative for chest pain. Gastrointestinal:  Positive for constipation. Musculoskeletal:  Positive for back pain. Negative for arthralgias and joint swelling. Skin:  Positive for pallor. Neurological:  Positive for weakness. All other systems reviewed and are negative.     Review of Systems: All 14 review of systems negative other than as stated above    Social History     Tobacco Use    Smoking status: Never    Smokeless tobacco: Never   Substance Use Topics    Alcohol use: No    Drug use: No         Past Medical History:   Diagnosis Date    Cancer ([facility] [address] 1999    bladder    Hyperlipidemia     Hypertension            Past Surgical History:   Procedure Laterality Date    ABDOMEN SURGERY      large intestine partial removal    APPENDECTOMY      CHOLECYSTECTOMY      HYSTERECTOMY (CERVIX STATUS UNKNOWN)      LAPAROTOMY N/A 11/7/2022    EXPLORATORY LAPAROTOMY LYSIS OF ADHESIONS performed by Az Nicholson MD at [facility]         Current Outpatient Medications on File Prior to Visit   Medication Sig Dispense Refill    apixaban (ELIQUIS) 2.5 MG TABS tablet Take 1 tablet by mouth 2 times daily (Patient taking differently: Take 2.5 mg by mouth 2 times daily Indications: Anticoagulant Therapy) 60 tablet 0    docusate sodium (COLACE, DULCOLAX) 100 MG CAPS Take 100 mg by mouth daily (Patient taking differently: Take 100 mg by mouth daily Indications: Constipation)      aspirin 81 MG EC tablet Take 81 mg by mouth daily Indications: Preventative Treatment      amLODIPine (NORVASC) 2.5 MG tablet Take 2.5 mg by mouth daily Indications: High Blood Pressure Disorder      diclofenac sodium (VOLTAREN) 1 % GEL Apply 2 g topically 4 times daily Indications: Pain associated with Arthritis To knees      melatonin 10 MG CAPS capsule Take 10 mg by mouth nightly Indications: Trouble Sleeping      mupirocin (BACTROBAN) 2 % ointment apply to the affected area of the left nasal passage three times daily for 10 days as directed      nitroGLYCERIN (NITROSTAT) 0.4 MG SL tablet Place 0.4 mg under the tongue every 5 minutes as needed for Chest pain      ondansetron (ZOFRAN ODT) 4 MG disintegrating tablet Take 1 tablet by mouth every 8 hours as needed for Nausea 3 tablet 0    Cholecalciferol 2000 units CAPS Take 2,000 Units by mouth every morning Indications: Nutritional Support      gabapentin (NEURONTIN) 100 MG capsule Take 200 mg by mouth every evening. Indications: Nerve Inflammation with Pain Patient states she is supposed to take 100 mg three times a day, but will only take 200 mg at bed time      metoprolol tartrate (LOPRESSOR) 25 MG tablet Take 25 mg by mouth 2 times daily Indications: High Blood Pressure Disorder      pravastatin (PRAVACHOL) 40 MG tablet Take 40 mg by mouth nightly Indications: High Amount of Fats in the Blood       No current facility-administered medications on file prior to visit.        Allergies   Allergen Reactions    Phenazopyridine Nausea And Vomiting     Other reaction(s): GI Upset         Family History   Problem Relation Age of Onset    Coronary Art Dis Brother          Physical Exam:      Physical Exam  Vitals and nursing note reviewed. Constitutional:       Appearance: Normal appearance. She is normal weight. HENT:      Head: Normocephalic. Nose: Nose normal.      Mouth/Throat:      Mouth: Mucous membranes are moist.   Eyes:      Extraocular Movements: Extraocular movements intact. Cardiovascular:      Rate and Rhythm: Normal rate and regular rhythm. Pulses: Normal pulses. Abdominal:      General: Bowel sounds are normal.      Palpations: Abdomen is soft. Tenderness: There is abdominal tenderness. Comments: Incisional sites intact   Skin:     General: Skin is dry. Neurological:      Mental Status: Mental status is at baseline. Psychiatric:         Mood and Affect: Mood normal.       There were no vitals taken for this visit.       .   Lab Results   Component Value Date    WBC 6.2 11/27/2022    HGB 11.1 (L) 11/27/2022    HCT 33.4 (L) 11/27/2022    MCV 94.9 (H) 11/27/2022     11/27/2022     Lab Results   Component Value Date/Time     11/30/2022 06:13 AM    K 4.4 11/30/2022 06:13 AM     11/30/2022 06:13 AM    CO2 24 11/30/2022 06:13 AM    BUN 4 11/30/2022 06:13 AM    CREATININE 0.31 11/30/2022 06:13 AM    GLUCOSE 102 11/30/2022 06:13 AM    CALCIUM 8.6 11/30/2022 06:13 AM                ASSESSMENT:  Patient Active Problem List   Diagnosis    Nausea vomiting and diarrhea    SBO (small bowel obstruction) ([facility])    Atypical chest pain    History of hypertrophic cardiomyopathy    History of PSVT (paroxysmal supraventricular tachycardia)    Abnormal EKG    Hypertension    JOAQUINA (acute kidney injury) ([address]    Persistent atrial fibrillation ([address]    ABMD (anterior basement membrane dystrophy)    Apical variant hypertrophic cardiomyopathy (HCC)    Blepharitis of left upper eyelid    Blepharitis of right upper eyelid    BPPV (benign paroxysmal positional vertigo), right    Chronic low back pain with bilateral sciatica Chronic pain of left knee    Difficulty walking    Dry eyes    Dysphagia    Finger stiffness, left    Gastroesophageal reflux disease without esophagitis    Hyperlipidemia    Left hand pain    Lumbar spondylosis    Lumbosacral stenosis    Mass of left hand    Meatal stenosis    Mitral valve disease    Nasal sore    Onychomycosis    Osteoarthrosis    Neoplasm of bladder    Osteopenia of lumbar spine    Punctate keratitis    Osteoporosis, post-menopausal    Recurrent UTI    Sacroiliitis (HCC)    SVT (supraventricular tachycardia) ([facility])    Tear film insufficiency    Thoracic or lumbosacral neuritis or radiculitis, unspecified         PLAN:   Diagnosis Orders   1. Acute constipation        2. SBO (small bowel obstruction) ([facility])        3. Persistent atrial fibrillation ([facility] [address]        4. Primary hypertension        5. Weakness        6. Neuropathy          New course physical therapy aqua therapy for support. Ongoing local abdominal site care. Monitoring bowel output. Monitor renal function. CP CBC BMP BMP pending. Continue monitor for evidence acute nausea vomiting. Please note orders entered on site at facility after discussion with appropriate facility nursing/therapy/ / nutritional staff. Current longstanding medical problems and acute medical issues addressed with staff. Available data and data elements in on site paper chart reviewed and analyzed. Current external consultant notes reviewed in on site chart. Ordered laboratory testing and imaging will be reviewed when available. This patient's need for psychiatric medication has been reviewed. Will consider further adjustment and possible further evaluations by mental health services.

## 2023-01-02 PROBLEM — I42.2 APICAL VARIANT HYPERTROPHIC CARDIOMYOPATHY (HCC): Status: ACTIVE | Noted: 2020-06-12

## 2023-01-02 PROBLEM — M19.90 OSTEOARTHROSIS: Status: ACTIVE | Noted: 2022-06-22

## 2023-01-02 PROBLEM — R22.32 MASS OF LEFT HAND: Status: ACTIVE | Noted: 2018-11-07

## 2023-01-02 PROBLEM — M46.1 SACROILIITIS (HCC): Status: ACTIVE | Noted: 2018-06-04

## 2023-01-02 PROBLEM — H81.11 BPPV (BENIGN PAROXYSMAL POSITIONAL VERTIGO), RIGHT: Status: ACTIVE | Noted: 2021-09-30

## 2023-01-02 PROBLEM — M25.642 FINGER STIFFNESS, LEFT: Status: ACTIVE | Noted: 2019-02-21

## 2023-01-02 PROBLEM — M85.88 OSTEOPENIA OF LUMBAR SPINE: Status: ACTIVE | Noted: 2017-11-20

## 2023-01-02 PROBLEM — K21.9 GASTROESOPHAGEAL REFLUX DISEASE WITHOUT ESOPHAGITIS: Status: ACTIVE | Noted: 2018-01-23

## 2023-01-02 PROBLEM — R26.2 DIFFICULTY WALKING: Status: ACTIVE | Noted: 2019-10-10

## 2023-01-02 PROBLEM — G89.29 CHRONIC LOW BACK PAIN WITH BILATERAL SCIATICA: Status: ACTIVE | Noted: 2017-11-20

## 2023-01-02 PROBLEM — M79.642 LEFT HAND PAIN: Status: ACTIVE | Noted: 2019-02-21

## 2023-01-02 PROBLEM — M54.42 CHRONIC LOW BACK PAIN WITH BILATERAL SCIATICA: Status: ACTIVE | Noted: 2017-11-20

## 2023-01-02 PROBLEM — M48.07 LUMBOSACRAL STENOSIS: Status: ACTIVE | Noted: 2017-11-20

## 2023-01-02 PROBLEM — M54.41 CHRONIC LOW BACK PAIN WITH BILATERAL SCIATICA: Status: ACTIVE | Noted: 2017-11-20

## 2023-01-02 PROBLEM — R13.10 DYSPHAGIA: Status: ACTIVE | Noted: 2018-01-23

## 2023-01-02 PROBLEM — I47.10 SVT (SUPRAVENTRICULAR TACHYCARDIA): Status: ACTIVE | Noted: 2020-09-14

## 2023-01-02 PROBLEM — I47.1 SVT (SUPRAVENTRICULAR TACHYCARDIA) (HCC): Status: ACTIVE | Noted: 2020-09-14

## 2023-01-02 PROBLEM — G89.29 CHRONIC PAIN OF LEFT KNEE: Status: ACTIVE | Noted: 2019-10-10

## 2023-01-02 PROBLEM — M25.562 CHRONIC PAIN OF LEFT KNEE: Status: ACTIVE | Noted: 2019-10-10

## 2023-01-02 PROBLEM — B35.1 ONYCHOMYCOSIS: Status: ACTIVE | Noted: 2017-04-20

## 2023-01-02 PROBLEM — M47.816 LUMBAR SPONDYLOSIS: Status: ACTIVE | Noted: 2017-11-20

## 2023-01-02 PROBLEM — J34.89 NASAL SORE: Status: ACTIVE | Noted: 2022-06-22

## 2023-01-02 PROBLEM — N39.0 RECURRENT UTI: Status: ACTIVE | Noted: 2017-12-06

## 2023-01-02 ASSESSMENT — ENCOUNTER SYMPTOMS
ABDOMINAL DISTENTION: 1
COUGH: 1
SHORTNESS OF BREATH: 1
SHORTNESS OF BREATH: 0
ABDOMINAL PAIN: 0
CONSTIPATION: 1
CONSTIPATION: 0
NAUSEA: 0
BLOOD IN STOOL: 0
COUGH: 0
BACK PAIN: 1
DIARRHEA: 0
VOMITING: 0
CHEST TIGHTNESS: 0

## 2023-01-02 NOTE — PROGRESS NOTES
Subjective:      Patient ID: Jaspal Matos is a pleasant 80 y.o. female who presents today for:  No chief complaint on file. [facility]    REASON FOR VISIT:Seen for discharge summary report. SUBJECTIVE: Patient admitted status post SBO. Did improve in hospital with NG tube. Subsequent exploratory laparotomy with removal of adhesions of small bowel. Patient has been eating slowly improving diet. Intake of fluids is improved. Patient not having any new cardiovascular complaints. Does have persistent A. fib, on routine Eliquis. Did participate well in therapy, made adequate gains. She is prepared for discharge home and will be going home with PT/OT/HHC. No new pain complaints, abdominal or otherwise. Patient to follow-up with PCP upon discharge home.     Patient Active Problem List   Diagnosis    Nausea vomiting and diarrhea    SBO (small bowel obstruction) (HCC)    Atypical chest pain    History of hypertrophic cardiomyopathy    History of PSVT (paroxysmal supraventricular tachycardia)    Abnormal EKG    Hypertension    JOAQUINA (acute kidney injury) ([address]    Persistent atrial fibrillation (HCC)    ABMD (anterior basement membrane dystrophy)    Apical variant hypertrophic cardiomyopathy (HCC)    Blepharitis of left upper eyelid    Blepharitis of right upper eyelid    BPPV (benign paroxysmal positional vertigo), right    Chronic low back pain with bilateral sciatica    Chronic pain of left knee    Difficulty walking    Dry eyes    Dysphagia    Finger stiffness, left    Gastroesophageal reflux disease without esophagitis    Hyperlipidemia    Left hand pain    Lumbar spondylosis    Lumbosacral stenosis    Mass of left hand    Meatal stenosis    Mitral valve disease    Nasal sore    Onychomycosis    Osteoarthrosis    Neoplasm of bladder    Osteopenia of lumbar spine    Punctate keratitis    Osteoporosis, post-menopausal    Recurrent UTI    Sacroiliitis (HCC)    SVT (supraventricular tachycardia) ([address] Tear film insufficiency    Thoracic or lumbosacral neuritis or radiculitis, unspecified     Past Medical History:   Diagnosis Date    Cancer ([facility] [address] 1999    bladder    Hyperlipidemia     Hypertension      Past Surgical History:   Procedure Laterality Date    ABDOMEN SURGERY      large intestine partial removal    APPENDECTOMY      CHOLECYSTECTOMY      HYSTERECTOMY (CERVIX STATUS UNKNOWN)      LAPAROTOMY N/A 11/7/2022    EXPLORATORY LAPAROTOMY LYSIS OF ADHESIONS performed by Kennedy Harris MD at [facility] History     Socioeconomic History    Marital status:      Spouse name: Not on file    Number of children: Not on file    Years of education: Not on file    Highest education level: Not on file   Occupational History    Not on file   Tobacco Use    Smoking status: Never    Smokeless tobacco: Never   Substance and Sexual Activity    Alcohol use: No    Drug use: No    Sexual activity: Not on file   Other Topics Concern    Not on file   Social History Narrative    Not on file     Social Determinants of Health     Financial Resource Strain: Not on file   Food Insecurity: Not on file   Transportation Needs: Not on file   Physical Activity: Not on file   Stress: Not on file   Social Connections: Not on file   Intimate Partner Violence: Not on file   Housing Stability: Not on file     Family History   Problem Relation Age of Onset    Coronary Art Dis Brother      Allergies   Allergen Reactions    Phenazopyridine Nausea And Vomiting     Other reaction(s): GI Upset       CURRENT MEDICATIONS: Docusate sodium 1 mg p.o. every morning, pravastatin 40 mg p.o. nightly, nitroglycerin sublingual 0.4 mg as needed every 5 minutes up to 3x4 CP, amlodipine besylate 2.5 mg p.o. every morning, gabapentin 200 mg p.o. nightly, melatonin 10 mg p.o. nightly, evidence of trend 4 mg p.o. every 8 hours as needed, ASA 81 mg p.o. every morning, Toprol tartrate 25 mg p.o. twice daily, cholecalciferol 2000 units p.o. every morning, diclofenac sodium gel 1% topical to knees 4 times daily, acetaminophen 6 and 50 mg p.o. every 4 hours as needed, Eliquis 2.5 mg p.o. twice daily. Review of Systems   Constitutional:  Positive for fatigue. Negative for activity change, appetite change (Improved since admission), chills and fever. Respiratory:  Negative for cough, chest tightness and shortness of breath. Cardiovascular:  Negative for chest pain. Gastrointestinal:  Positive for abdominal distention. Negative for abdominal pain (Minimal), blood in stool, constipation, diarrhea, nausea and vomiting. Genitourinary:  Negative for difficulty urinating. Neurological:  Positive for weakness. Psychiatric/Behavioral:  Negative for confusion and decreased concentration. All other systems reviewed and are negative. Objective:   VS: See [address] Reviewed. Physical Exam  Vitals (115/72 mmHg, 97.9 °F, 70 bpm, 18 RR, 90% SPO2 on room air, WT = 123.6 LBS) and nursing note reviewed. Constitutional:       General: She is not in acute distress. Appearance: Normal appearance. She is normal weight. She is not ill-appearing. HENT:      Head: Normocephalic and atraumatic. Mouth/Throat:      Mouth: Mucous membranes are moist.      Pharynx: Oropharynx is clear. Eyes:      General: No scleral icterus. Conjunctiva/sclera: Conjunctivae normal.   Cardiovascular:      Rate and Rhythm: Normal rate and regular rhythm. Pulmonary:      Effort: Pulmonary effort is normal.      Breath sounds: Normal breath sounds. Abdominal:      General: Bowel sounds are normal. There is distension. Palpations: Abdomen is soft. There is no mass. Tenderness: There is no abdominal tenderness (s/p surgery). There is no guarding or rebound. Skin:     General: Skin is warm and dry. Comments: Surgical sites clean dry and intact without evidence of any sequelae.    Neurological:      Mental Status: She is alert and oriented to person, place, and time. Mental status is at baseline. Motor: Weakness present. Psychiatric:         Mood and Affect: Mood normal.         Behavior: Behavior normal.         Thought Content: Thought content normal.       Assessment:       Diagnosis Orders   1. Hx SBO        2. Persistent atrial fibrillation ([address]              Plan:          No new changes to patient's current orders. Will DC home with HHC/PT/OT for ongoing recovery. Follow-up with PCP upon discharge. Follow-up with general surgery for further monitoring on as needed basis. Maintain current cardiac meds, No changes. Vital signs stable on discharge    I have reviewed this resident's medication and treatment plan as well as most recent lab work. Resident will be discharged home on 12/9/2022. Pt  will continue to follow-up with PCP and general surgery as an outpatient as ordered. Pt may have HHC/PT/OT as needed/desired per pt tolerance and need. Patient is to follow-up with her PCP within a week of discharge. Greater than 30 minutes was spent in the discharge planning of this patient. It was a pleasure following with this patient while they resided at [address] [place] [address]  . [place], [place]        Please note orders entered on site at facility after discussion with appropriate facility nursing/therapy/ / nutritional staff. Current longstanding medical problems and acute medical issues addressed with staff. Available data and data elements in on site paper chart reviewed and analyzed. Current external consultant notes reviewed in on site chart. Ordered laboratory testing and imaging will be reviewed when available. This patient's need for psychiatric medication has been reviewed. Will consider further adjustment and possible further evaluations by mental health services.     Side effects, adverse effects of the medication prescribed today, as well as treatment plan and result expectations have been discussed withthe patient who expresses understanding and desires to proceed.

## 2023-04-29 ENCOUNTER — HOSPITAL ENCOUNTER (EMERGENCY)
Age: 88
Discharge: HOME | End: 2023-04-29
Payer: MEDICARE

## 2023-04-29 ENCOUNTER — APPOINTMENT (OUTPATIENT)
Dept: GENERAL RADIOLOGY | Age: 88
End: 2023-04-29
Payer: MEDICARE

## 2023-04-29 VITALS
OXYGEN SATURATION: 98 % | TEMPERATURE: 97.8 F | BODY MASS INDEX: 23 KG/M2 | HEART RATE: 74 BPM | HEIGHT: 62 IN | RESPIRATION RATE: 17 BRPM | DIASTOLIC BLOOD PRESSURE: 82 MMHG | WEIGHT: 125 LBS | SYSTOLIC BLOOD PRESSURE: 174 MMHG

## 2023-04-29 DIAGNOSIS — R07.9 CHEST PAIN, UNSPECIFIED TYPE: Primary | ICD-10-CM

## 2023-04-29 LAB
ALBUMIN SERPL-MCNC: 3.8 G/DL (ref 3.5–4.6)
ALP SERPL-CCNC: 62 U/L (ref 40–130)
ALT SERPL-CCNC: 19 U/L (ref 0–33)
ANION GAP SERPL CALCULATED.3IONS-SCNC: 14 MEQ/L (ref 9–15)
APTT PPP: 31.8 SEC (ref 24.4–36.8)
AST SERPL-CCNC: 27 U/L (ref 0–35)
BASOPHILS # BLD: 0 K/UL (ref 0–0.2)
BASOPHILS NFR BLD: 0.5 %
BILIRUB SERPL-MCNC: 0.5 MG/DL (ref 0.2–0.7)
BUN SERPL-MCNC: 16 MG/DL (ref 8–23)
CALCIUM SERPL-MCNC: 9.3 MG/DL (ref 8.5–9.9)
CHLORIDE SERPL-SCNC: 103 MEQ/L (ref 95–107)
CK SERPL-CCNC: 56 U/L (ref 0–170)
CO2 SERPL-SCNC: 20 MEQ/L (ref 20–31)
CREAT SERPL-MCNC: 0.67 MG/DL (ref 0.5–0.9)
EKG ATRIAL RATE: 72 BPM
EKG Q-T INTERVAL: 418 MS
EKG QRS DURATION: 82 MS
EKG QTC CALCULATION (BAZETT): 493 MS
EKG R AXIS: 43 DEGREES
EKG T AXIS: 184 DEGREES
EKG VENTRICULAR RATE: 84 BPM
EOSINOPHIL # BLD: 0 K/UL (ref 0–0.7)
EOSINOPHIL NFR BLD: 0.3 %
ERYTHROCYTE [DISTWIDTH] IN BLOOD BY AUTOMATED COUNT: 14.7 % (ref 11.5–14.5)
GLOBULIN SER CALC-MCNC: 2.5 G/DL (ref 2.3–3.5)
GLUCOSE SERPL-MCNC: 97 MG/DL (ref 70–99)
HCT VFR BLD AUTO: 42.6 % (ref 37–47)
HGB BLD-MCNC: 14.1 G/DL (ref 12–16)
INR PPP: 1.2
LIPASE SERPL-CCNC: 33 U/L (ref 12–95)
LYMPHOCYTES # BLD: 1.8 K/UL (ref 1–4.8)
LYMPHOCYTES NFR BLD: 23.2 %
MAGNESIUM SERPL-MCNC: 2 MG/DL (ref 1.7–2.4)
MCH RBC QN AUTO: 32 PG (ref 27–31.3)
MCHC RBC AUTO-ENTMCNC: 33.1 % (ref 33–37)
MCV RBC AUTO: 96.7 FL (ref 79.4–94.8)
MONOCYTES # BLD: 0.5 K/UL (ref 0.2–0.8)
MONOCYTES NFR BLD: 6.9 %
NEUTROPHILS # BLD: 5.5 K/UL (ref 1.4–6.5)
NEUTS SEG NFR BLD: 69.1 %
PLATELET # BLD AUTO: 157 K/UL (ref 130–400)
POTASSIUM SERPL-SCNC: 3.7 MEQ/L (ref 3.4–4.9)
PROT SERPL-MCNC: 6.3 G/DL (ref 6.3–8)
PROTHROMBIN TIME: 15.1 SEC (ref 12.3–14.9)
RBC # BLD AUTO: 4.41 M/UL (ref 4.2–5.4)
SODIUM SERPL-SCNC: 137 MEQ/L (ref 135–144)
TROPONIN T SERPL-MCNC: <0.01 NG/ML (ref 0–0.01)
TROPONIN T SERPL-MCNC: <0.01 NG/ML (ref 0–0.01)
WBC # BLD AUTO: 7.9 K/UL (ref 4.8–10.8)

## 2023-04-29 PROCEDURE — 80053 COMPREHEN METABOLIC PANEL: CPT

## 2023-04-29 PROCEDURE — 85610 PROTHROMBIN TIME: CPT

## 2023-04-29 PROCEDURE — 83735 ASSAY OF MAGNESIUM: CPT

## 2023-04-29 PROCEDURE — 99285 EMERGENCY DEPT VISIT HI MDM: CPT

## 2023-04-29 PROCEDURE — 6370000000 HC RX 637 (ALT 250 FOR IP): Performed by: PHYSICIAN ASSISTANT

## 2023-04-29 PROCEDURE — 85025 COMPLETE CBC W/AUTO DIFF WBC: CPT

## 2023-04-29 PROCEDURE — 83690 ASSAY OF LIPASE: CPT

## 2023-04-29 PROCEDURE — 96374 THER/PROPH/DIAG INJ IV PUSH: CPT

## 2023-04-29 PROCEDURE — 93005 ELECTROCARDIOGRAM TRACING: CPT | Performed by: PHYSICIAN ASSISTANT

## 2023-04-29 PROCEDURE — 6360000002 HC RX W HCPCS: Performed by: PHYSICIAN ASSISTANT

## 2023-04-29 PROCEDURE — 85730 THROMBOPLASTIN TIME PARTIAL: CPT

## 2023-04-29 PROCEDURE — 82550 ASSAY OF CK (CPK): CPT

## 2023-04-29 PROCEDURE — 36415 COLL VENOUS BLD VENIPUNCTURE: CPT

## 2023-04-29 PROCEDURE — 71045 X-RAY EXAM CHEST 1 VIEW: CPT

## 2023-04-29 PROCEDURE — 84484 ASSAY OF TROPONIN QUANT: CPT

## 2023-04-29 RX ORDER — ASPIRIN 325 MG
325 TABLET ORAL ONCE
Status: COMPLETED | OUTPATIENT
Start: 2023-04-29 | End: 2023-04-29

## 2023-04-29 RX ORDER — LORAZEPAM 2 MG/ML
0.25 INJECTION INTRAMUSCULAR ONCE
Status: COMPLETED | OUTPATIENT
Start: 2023-04-29 | End: 2023-04-29

## 2023-04-29 RX ADMIN — LORAZEPAM 0.25 MG: 2 INJECTION INTRAMUSCULAR; INTRAVENOUS at 17:00

## 2023-04-29 RX ADMIN — ASPIRIN 325 MG: 325 TABLET ORAL at 18:18

## 2023-04-29 RX ADMIN — NITROGLYCERIN 0.5 INCH: 20 OINTMENT TOPICAL at 18:18

## 2023-04-29 ASSESSMENT — ENCOUNTER SYMPTOMS
SHORTNESS OF BREATH: 0
EYE DISCHARGE: 0
CONSTIPATION: 0
RHINORRHEA: 0
NAUSEA: 0
ABDOMINAL PAIN: 0
VOMITING: 0
ABDOMINAL DISTENTION: 0
COLOR CHANGE: 0
SORE THROAT: 0
DIARRHEA: 0

## 2023-04-29 ASSESSMENT — HEART SCORE: ECG: 0

## 2023-04-29 NOTE — ED PROVIDER NOTES
[facility] ED  eMERGENCY dEPARTMENT eNCOUnter      Pt Name: Pat Carty  MRN: [id number]  [place] 1935  Date of evaluation: 4/29/2023  Provider: Gregorio Lennon [facility]       Chief Complaint   Patient presents with    Chest Pain         HISTORY OF PRESENT ILLNESS   (Location/Symptom, Timing/Onset,Context/Setting, Quality, Duration, Modifying Factors, Severity)  Note limiting factors. Pat Carty is a 80 y.o. female who presents to the emergency department placed a started suddenly while she was walking up her stairs from her basement at home. She describes as a heaviness across her chest, she rates it as 8 out of 10, she states she did take 2 nitroglycerin at home without any significant relief, she was given 50 mics of fentanyl by EMS, is currently pain-free at this time. She denies a cough, no fevers or shortness of breath, denies acute illness or injury. Medical history significant for hyperlipidemia, hypertension, atrial fibrillation    HPI    NursingNotes were reviewed. REVIEW OF SYSTEMS    (2-9 systems for level 4, 10 or more for level 5)     Review of Systems   Constitutional:  Negative for activity change and appetite change. HENT:  Negative for congestion, ear discharge, ear pain, nosebleeds, rhinorrhea and sore throat. Eyes:  Negative for discharge. Respiratory:  Negative for shortness of breath. Cardiovascular:  Positive for chest pain. Negative for palpitations and leg swelling. Gastrointestinal:  Negative for abdominal distention, abdominal pain, constipation, diarrhea, nausea and vomiting. Genitourinary:  Negative for difficulty urinating and dysuria. Musculoskeletal:  Negative for arthralgias. Skin:  Negative for color change, pallor, rash and wound. Neurological:  Negative for dizziness, tremors, syncope, weakness, numbness and headaches. Psychiatric/Behavioral:  Negative for agitation and confusion.       Except as noted above the

## 2023-05-01 LAB
EKG ATRIAL RATE: 72 BPM
EKG Q-T INTERVAL: 418 MS
EKG QRS DURATION: 82 MS
EKG QTC CALCULATION (BAZETT): 493 MS
EKG R AXIS: 43 DEGREES
EKG T AXIS: 184 DEGREES
EKG VENTRICULAR RATE: 84 BPM

## 2023-05-01 PROCEDURE — 93010 ELECTROCARDIOGRAM REPORT: CPT | Performed by: INTERNAL MEDICINE

## 2023-05-03 LAB
EKG ATRIAL RATE: 77 BPM
EKG Q-T INTERVAL: 444 MS
EKG QRS DURATION: 90 MS
EKG QTC CALCULATION (BAZETT): 531 MS
EKG R AXIS: 77 DEGREES
EKG T AXIS: 236 DEGREES
EKG VENTRICULAR RATE: 86 BPM

## 2023-11-30 ENCOUNTER — APPOINTMENT (OUTPATIENT)
Dept: CT IMAGING | Age: 88
End: 2023-11-30
Payer: MEDICARE

## 2023-11-30 ENCOUNTER — HOSPITAL ENCOUNTER (EMERGENCY)
Age: 88
Discharge: HOME | End: 2023-11-30
Payer: MEDICARE

## 2023-11-30 VITALS
RESPIRATION RATE: 18 BRPM | HEIGHT: 62 IN | SYSTOLIC BLOOD PRESSURE: 141 MMHG | BODY MASS INDEX: 23.37 KG/M2 | WEIGHT: 127 LBS | DIASTOLIC BLOOD PRESSURE: 64 MMHG | HEART RATE: 57 BPM | TEMPERATURE: 97.7 F | OXYGEN SATURATION: 100 %

## 2023-11-30 DIAGNOSIS — G89.29 ACUTE EXACERBATION OF CHRONIC LOW BACK PAIN: Primary | ICD-10-CM

## 2023-11-30 DIAGNOSIS — M54.50 ACUTE EXACERBATION OF CHRONIC LOW BACK PAIN: Primary | ICD-10-CM

## 2023-11-30 PROCEDURE — 6370000000 HC RX 637 (ALT 250 FOR IP)

## 2023-11-30 PROCEDURE — 96375 TX/PRO/DX INJ NEW DRUG ADDON: CPT

## 2023-11-30 PROCEDURE — 72131 CT LUMBAR SPINE W/O DYE: CPT

## 2023-11-30 PROCEDURE — 6360000002 HC RX W HCPCS

## 2023-11-30 PROCEDURE — 99284 EMERGENCY DEPT VISIT MOD MDM: CPT

## 2023-11-30 PROCEDURE — 96374 THER/PROPH/DIAG INJ IV PUSH: CPT

## 2023-11-30 RX ORDER — HYDROCODONE BITARTRATE AND ACETAMINOPHEN 5; 325 MG/1; MG/1
1 TABLET ORAL ONCE
Status: COMPLETED | OUTPATIENT
Start: 2023-11-30 | End: 2023-11-30

## 2023-11-30 RX ORDER — ONDANSETRON 2 MG/ML
4 INJECTION INTRAMUSCULAR; INTRAVENOUS ONCE
Status: COMPLETED | OUTPATIENT
Start: 2023-11-30 | End: 2023-11-30

## 2023-11-30 RX ORDER — HYDROCODONE BITARTRATE AND ACETAMINOPHEN 5; 325 MG/1; MG/1
1 TABLET ORAL EVERY 8 HOURS PRN
Qty: 9 TABLET | Refills: 0 | Status: SHIPPED | OUTPATIENT
Start: 2023-11-30 | End: 2023-12-03

## 2023-11-30 RX ORDER — MORPHINE SULFATE 4 MG/ML
4 INJECTION, SOLUTION INTRAMUSCULAR; INTRAVENOUS ONCE
Status: COMPLETED | OUTPATIENT
Start: 2023-11-30 | End: 2023-11-30

## 2023-11-30 RX ADMIN — MORPHINE SULFATE 4 MG: 4 INJECTION, SOLUTION INTRAMUSCULAR; INTRAVENOUS at 18:52

## 2023-11-30 RX ADMIN — HYDROCODONE BITARTRATE AND ACETAMINOPHEN 1 TABLET: 5; 325 TABLET ORAL at 20:30

## 2023-11-30 RX ADMIN — ONDANSETRON 4 MG: 2 INJECTION INTRAMUSCULAR; INTRAVENOUS at 18:54

## 2023-11-30 ASSESSMENT — PAIN SCALES - GENERAL
PAINLEVEL_OUTOF10: 10
PAINLEVEL_OUTOF10: 10
PAINLEVEL_OUTOF10: 2

## 2023-11-30 ASSESSMENT — PAIN DESCRIPTION - DESCRIPTORS
DESCRIPTORS: ACHING
DESCRIPTORS: ACHING

## 2023-11-30 ASSESSMENT — ENCOUNTER SYMPTOMS: BACK PAIN: 1

## 2023-11-30 ASSESSMENT — PAIN DESCRIPTION - LOCATION
LOCATION: BACK
LOCATION: BACK

## 2023-11-30 ASSESSMENT — PAIN DESCRIPTION - ORIENTATION
ORIENTATION: RIGHT;LOWER
ORIENTATION: RIGHT;LEFT;LOWER

## 2023-11-30 ASSESSMENT — PAIN - FUNCTIONAL ASSESSMENT: PAIN_FUNCTIONAL_ASSESSMENT: 0-10

## 2023-11-30 NOTE — ED TRIAGE NOTES
Pt presents to Er from home via Ems for lower back pain that has been going on for a while but flared up really bad as of yesterday. Pt is unable to get in for a shot in her back till March next year and the pain has been so bad she can barely ambulate. Pt had the pain on the left side yesterday, but today it is on the right. Pt is A&Ox4, warm and dry at this time.

## 2023-12-01 NOTE — DISCHARGE INSTRUCTIONS
Take medications as directed. Do not take additional Tylenol in addition to Norco.    Follow-up with PCP, pain management. Return to ED if any new, or worsening symptoms.

## 2024-05-19 ENCOUNTER — HOSPITAL ENCOUNTER (INPATIENT)
Age: 89
LOS: 2 days | Discharge: HOME | DRG: 389 | End: 2024-05-23
Attending: EMERGENCY MEDICINE | Admitting: INTERNAL MEDICINE
Payer: MEDICARE

## 2024-05-19 ENCOUNTER — APPOINTMENT (OUTPATIENT)
Dept: CT IMAGING | Age: 89
DRG: 389 | End: 2024-05-19
Payer: MEDICARE

## 2024-05-19 DIAGNOSIS — R10.9 INTRACTABLE ABDOMINAL PAIN: Primary | ICD-10-CM

## 2024-05-19 DIAGNOSIS — K56.7 ILEUS (HCC): ICD-10-CM

## 2024-05-19 LAB
ALBUMIN SERPL-MCNC: 3.6 G/DL (ref 3.5–4.6)
ALP SERPL-CCNC: 62 U/L (ref 40–130)
ALT SERPL-CCNC: 20 U/L (ref 0–33)
ANION GAP SERPL CALCULATED.3IONS-SCNC: 10 MEQ/L (ref 9–15)
AST SERPL-CCNC: 22 U/L (ref 0–35)
BACTERIA URNS QL MICRO: NEGATIVE /HPF
BASOPHILS # BLD: 0 K/UL (ref 0–0.2)
BASOPHILS NFR BLD: 0.6 %
BILIRUB SERPL-MCNC: 0.4 MG/DL (ref 0.2–0.7)
BILIRUB UR QL STRIP: NEGATIVE
BUN SERPL-MCNC: 21 MG/DL (ref 8–23)
CALCIUM SERPL-MCNC: 9.3 MG/DL (ref 8.5–9.9)
CHLORIDE SERPL-SCNC: 109 MEQ/L (ref 95–107)
CLARITY UR: CLEAR
CO2 SERPL-SCNC: 27 MEQ/L (ref 20–31)
COLOR UR: YELLOW
CREAT SERPL-MCNC: 0.63 MG/DL (ref 0.5–0.9)
EOSINOPHIL # BLD: 0 K/UL (ref 0–0.7)
EOSINOPHIL NFR BLD: 0.8 %
EPI CELLS #/AREA URNS AUTO: ABNORMAL /HPF (ref 0–5)
ERYTHROCYTE [DISTWIDTH] IN BLOOD BY AUTOMATED COUNT: 13.3 % (ref 11.5–14.5)
GLOBULIN SER CALC-MCNC: 2.1 G/DL (ref 2.3–3.5)
GLUCOSE SERPL-MCNC: 101 MG/DL (ref 70–99)
GLUCOSE UR STRIP-MCNC: NEGATIVE MG/DL
HCT VFR BLD AUTO: 39.2 % (ref 37–47)
HGB BLD-MCNC: 12.7 G/DL (ref 12–16)
HGB UR QL STRIP: NEGATIVE
HYALINE CASTS #/AREA URNS AUTO: ABNORMAL /HPF (ref 0–5)
KETONES UR STRIP-MCNC: NEGATIVE MG/DL
LACTATE BLDV-SCNC: 0.7 MMOL/L (ref 0.5–2.2)
LEUKOCYTE ESTERASE UR QL STRIP: ABNORMAL
LIPASE SERPL-CCNC: 23 U/L (ref 12–95)
LYMPHOCYTES # BLD: 1.6 K/UL (ref 1–4.8)
LYMPHOCYTES NFR BLD: 30.9 %
MCH RBC QN AUTO: 32.5 PG (ref 27–31.3)
MCHC RBC AUTO-ENTMCNC: 32.4 % (ref 33–37)
MCV RBC AUTO: 100.3 FL (ref 79.4–94.8)
MONOCYTES # BLD: 0.5 K/UL (ref 0.2–0.8)
MONOCYTES NFR BLD: 9.8 %
NEUTROPHILS # BLD: 3.1 K/UL (ref 1.4–6.5)
NEUTS SEG NFR BLD: 57.7 %
NITRITE UR QL STRIP: NEGATIVE
PH UR STRIP: 5.5 [PH] (ref 5–9)
PLATELET # BLD AUTO: 147 K/UL (ref 130–400)
POC CREATININE WHOLE BLOOD: 0.6
POTASSIUM SERPL-SCNC: 4 MEQ/L (ref 3.4–4.9)
PROT SERPL-MCNC: 5.7 G/DL (ref 6.3–8)
PROT UR STRIP-MCNC: NEGATIVE MG/DL
RBC # BLD AUTO: 3.91 M/UL (ref 4.2–5.4)
RBC #/AREA URNS AUTO: ABNORMAL /HPF (ref 0–5)
SODIUM SERPL-SCNC: 146 MEQ/L (ref 135–144)
SP GR UR STRIP: 1.04 (ref 1–1.03)
UROBILINOGEN UR STRIP-ACNC: 0.2 E.U./DL
WBC # BLD AUTO: 5.3 K/UL (ref 4.8–10.8)
WBC #/AREA URNS AUTO: ABNORMAL /HPF (ref 0–5)

## 2024-05-19 PROCEDURE — 93005 ELECTROCARDIOGRAM TRACING: CPT | Performed by: EMERGENCY MEDICINE

## 2024-05-19 PROCEDURE — 80053 COMPREHEN METABOLIC PANEL: CPT

## 2024-05-19 PROCEDURE — 74177 CT ABD & PELVIS W/CONTRAST: CPT

## 2024-05-19 PROCEDURE — 85025 COMPLETE CBC W/AUTO DIFF WBC: CPT

## 2024-05-19 PROCEDURE — 81001 URINALYSIS AUTO W/SCOPE: CPT

## 2024-05-19 PROCEDURE — G0378 HOSPITAL OBSERVATION PER HR: HCPCS

## 2024-05-19 PROCEDURE — 83605 ASSAY OF LACTIC ACID: CPT

## 2024-05-19 PROCEDURE — 96376 TX/PRO/DX INJ SAME DRUG ADON: CPT

## 2024-05-19 PROCEDURE — 83690 ASSAY OF LIPASE: CPT

## 2024-05-19 PROCEDURE — 99285 EMERGENCY DEPT VISIT HI MDM: CPT

## 2024-05-19 PROCEDURE — 2580000003 HC RX 258: Performed by: EMERGENCY MEDICINE

## 2024-05-19 PROCEDURE — 6360000004 HC RX CONTRAST MEDICATION: Performed by: EMERGENCY MEDICINE

## 2024-05-19 PROCEDURE — 36415 COLL VENOUS BLD VENIPUNCTURE: CPT

## 2024-05-19 PROCEDURE — 6370000000 HC RX 637 (ALT 250 FOR IP): Performed by: INTERNAL MEDICINE

## 2024-05-19 PROCEDURE — 6360000002 HC RX W HCPCS: Performed by: EMERGENCY MEDICINE

## 2024-05-19 PROCEDURE — 2580000003 HC RX 258: Performed by: INTERNAL MEDICINE

## 2024-05-19 PROCEDURE — 96374 THER/PROPH/DIAG INJ IV PUSH: CPT

## 2024-05-19 RX ORDER — HYDROMORPHONE HYDROCHLORIDE 1 MG/ML
1 INJECTION, SOLUTION INTRAMUSCULAR; INTRAVENOUS; SUBCUTANEOUS
Status: COMPLETED | OUTPATIENT
Start: 2024-05-19 | End: 2024-05-19

## 2024-05-19 RX ORDER — MAGNESIUM SULFATE IN WATER 40 MG/ML
2000 INJECTION, SOLUTION INTRAVENOUS PRN
Status: DISCONTINUED | OUTPATIENT
Start: 2024-05-19 | End: 2024-05-23 | Stop reason: HOSPADM

## 2024-05-19 RX ORDER — SODIUM CHLORIDE 0.9 % (FLUSH) 0.9 %
5-40 SYRINGE (ML) INJECTION EVERY 12 HOURS SCHEDULED
Status: DISCONTINUED | OUTPATIENT
Start: 2024-05-19 | End: 2024-05-23 | Stop reason: HOSPADM

## 2024-05-19 RX ORDER — PREGABALIN 25 MG/1
25 CAPSULE ORAL 2 TIMES DAILY
Status: DISCONTINUED | OUTPATIENT
Start: 2024-05-19 | End: 2024-05-23 | Stop reason: HOSPADM

## 2024-05-19 RX ORDER — SODIUM CHLORIDE 9 MG/ML
INJECTION, SOLUTION INTRAVENOUS PRN
Status: DISCONTINUED | OUTPATIENT
Start: 2024-05-19 | End: 2024-05-23 | Stop reason: HOSPADM

## 2024-05-19 RX ORDER — POTASSIUM CHLORIDE 7.45 MG/ML
10 INJECTION INTRAVENOUS PRN
Status: DISCONTINUED | OUTPATIENT
Start: 2024-05-19 | End: 2024-05-23 | Stop reason: HOSPADM

## 2024-05-19 RX ORDER — POTASSIUM CHLORIDE 20 MEQ/1
40 TABLET, EXTENDED RELEASE ORAL PRN
Status: DISCONTINUED | OUTPATIENT
Start: 2024-05-19 | End: 2024-05-23 | Stop reason: HOSPADM

## 2024-05-19 RX ORDER — POLYETHYLENE GLYCOL 3350 17 G/17G
17 POWDER, FOR SOLUTION ORAL DAILY PRN
Status: DISCONTINUED | OUTPATIENT
Start: 2024-05-19 | End: 2024-05-23 | Stop reason: HOSPADM

## 2024-05-19 RX ORDER — HYDROMORPHONE HYDROCHLORIDE 1 MG/ML
1 INJECTION, SOLUTION INTRAMUSCULAR; INTRAVENOUS; SUBCUTANEOUS ONCE
Status: COMPLETED | OUTPATIENT
Start: 2024-05-19 | End: 2024-05-19

## 2024-05-19 RX ORDER — 0.9 % SODIUM CHLORIDE 0.9 %
1000 INTRAVENOUS SOLUTION INTRAVENOUS ONCE
Status: COMPLETED | OUTPATIENT
Start: 2024-05-19 | End: 2024-05-19

## 2024-05-19 RX ORDER — PRAVASTATIN SODIUM 40 MG
40 TABLET ORAL NIGHTLY
Status: DISCONTINUED | OUTPATIENT
Start: 2024-05-19 | End: 2024-05-23 | Stop reason: HOSPADM

## 2024-05-19 RX ORDER — SODIUM CHLORIDE 0.9 % (FLUSH) 0.9 %
5-40 SYRINGE (ML) INJECTION PRN
Status: DISCONTINUED | OUTPATIENT
Start: 2024-05-19 | End: 2024-05-23 | Stop reason: HOSPADM

## 2024-05-19 RX ORDER — SODIUM CHLORIDE 9 MG/ML
INJECTION, SOLUTION INTRAVENOUS CONTINUOUS
Status: DISPENSED | OUTPATIENT
Start: 2024-05-19 | End: 2024-05-21

## 2024-05-19 RX ORDER — PANTOPRAZOLE SODIUM 40 MG/1
40 TABLET, DELAYED RELEASE ORAL
Status: DISCONTINUED | OUTPATIENT
Start: 2024-05-20 | End: 2024-05-23 | Stop reason: HOSPADM

## 2024-05-19 RX ORDER — OXYBUTYNIN CHLORIDE 5 MG/1
5 TABLET ORAL 2 TIMES DAILY
Status: DISCONTINUED | OUTPATIENT
Start: 2024-05-19 | End: 2024-05-23 | Stop reason: HOSPADM

## 2024-05-19 RX ORDER — ONDANSETRON 4 MG/1
4 TABLET, ORALLY DISINTEGRATING ORAL EVERY 8 HOURS PRN
Status: DISCONTINUED | OUTPATIENT
Start: 2024-05-19 | End: 2024-05-23 | Stop reason: HOSPADM

## 2024-05-19 RX ORDER — ACETAMINOPHEN 325 MG/1
650 TABLET ORAL EVERY 6 HOURS PRN
Status: DISCONTINUED | OUTPATIENT
Start: 2024-05-19 | End: 2024-05-23 | Stop reason: HOSPADM

## 2024-05-19 RX ORDER — ONDANSETRON 2 MG/ML
4 INJECTION INTRAMUSCULAR; INTRAVENOUS EVERY 6 HOURS PRN
Status: DISCONTINUED | OUTPATIENT
Start: 2024-05-19 | End: 2024-05-23 | Stop reason: HOSPADM

## 2024-05-19 RX ORDER — MORPHINE SULFATE 2 MG/ML
2 INJECTION, SOLUTION INTRAMUSCULAR; INTRAVENOUS EVERY 4 HOURS PRN
Status: DISCONTINUED | OUTPATIENT
Start: 2024-05-19 | End: 2024-05-23 | Stop reason: HOSPADM

## 2024-05-19 RX ORDER — ACETAMINOPHEN 650 MG/1
650 SUPPOSITORY RECTAL EVERY 6 HOURS PRN
Status: DISCONTINUED | OUTPATIENT
Start: 2024-05-19 | End: 2024-05-23 | Stop reason: HOSPADM

## 2024-05-19 RX ADMIN — HYDROMORPHONE HYDROCHLORIDE 1 MG: 1 INJECTION, SOLUTION INTRAMUSCULAR; INTRAVENOUS; SUBCUTANEOUS at 17:59

## 2024-05-19 RX ADMIN — PRAVASTATIN SODIUM 40 MG: 40 TABLET ORAL at 22:30

## 2024-05-19 RX ADMIN — IOPAMIDOL 75 ML: 755 INJECTION, SOLUTION INTRAVENOUS at 16:55

## 2024-05-19 RX ADMIN — PREGABALIN 25 MG: 25 CAPSULE ORAL at 22:50

## 2024-05-19 RX ADMIN — HYDROMORPHONE HYDROCHLORIDE 1 MG: 1 INJECTION, SOLUTION INTRAMUSCULAR; INTRAVENOUS; SUBCUTANEOUS at 16:08

## 2024-05-19 RX ADMIN — SODIUM CHLORIDE 1000 ML: 9 INJECTION, SOLUTION INTRAVENOUS at 16:07

## 2024-05-19 RX ADMIN — Medication 10 ML: at 22:33

## 2024-05-19 RX ADMIN — METOPROLOL TARTRATE 37.5 MG: 25 TABLET, FILM COATED ORAL at 22:31

## 2024-05-19 RX ADMIN — SODIUM CHLORIDE: 9 INJECTION, SOLUTION INTRAVENOUS at 22:27

## 2024-05-19 RX ADMIN — APIXABAN 2.5 MG: 2.5 TABLET, FILM COATED ORAL at 22:30

## 2024-05-19 RX ADMIN — OXYBUTYNIN CHLORIDE 5 MG: 5 TABLET ORAL at 22:30

## 2024-05-19 ASSESSMENT — PAIN SCALES - GENERAL
PAINLEVEL_OUTOF10: 10
PAINLEVEL_OUTOF10: 9
PAINLEVEL_OUTOF10: 10
PAINLEVEL_OUTOF10: 10

## 2024-05-19 ASSESSMENT — PAIN - FUNCTIONAL ASSESSMENT
PAIN_FUNCTIONAL_ASSESSMENT: 0-10
PAIN_FUNCTIONAL_ASSESSMENT: PREVENTS OR INTERFERES WITH MANY ACTIVE NOT PASSIVE ACTIVITIES
PAIN_FUNCTIONAL_ASSESSMENT: PREVENTS OR INTERFERES SOME ACTIVE ACTIVITIES AND ADLS

## 2024-05-19 ASSESSMENT — PAIN DESCRIPTION - ORIENTATION
ORIENTATION: MID;RIGHT
ORIENTATION: RIGHT;LOWER
ORIENTATION: RIGHT
ORIENTATION: RIGHT

## 2024-05-19 ASSESSMENT — LIFESTYLE VARIABLES
HOW OFTEN DO YOU HAVE A DRINK CONTAINING ALCOHOL: NEVER
HOW OFTEN DO YOU HAVE A DRINK CONTAINING ALCOHOL: NEVER
HOW MANY STANDARD DRINKS CONTAINING ALCOHOL DO YOU HAVE ON A TYPICAL DAY: PATIENT DOES NOT DRINK
HOW MANY STANDARD DRINKS CONTAINING ALCOHOL DO YOU HAVE ON A TYPICAL DAY: PATIENT DOES NOT DRINK

## 2024-05-19 ASSESSMENT — PAIN DESCRIPTION - DESCRIPTORS
DESCRIPTORS: SHARP;STABBING
DESCRIPTORS: SHARP;STABBING
DESCRIPTORS: DISCOMFORT;ACHING
DESCRIPTORS: SHARP;STABBING

## 2024-05-19 ASSESSMENT — PAIN DESCRIPTION - LOCATION
LOCATION: BACK
LOCATION: ABDOMEN
LOCATION: BACK
LOCATION: BACK

## 2024-05-19 ASSESSMENT — PAIN DESCRIPTION - DIRECTION: RADIATING_TOWARDS: ABDOMEN

## 2024-05-19 NOTE — H&P
History and Physical    Admit Date: 5/19/2024  PCP: Taylor Neumann PA-C    CHIEF COMPLAINT:    Chief Complaint   Patient presents with    Back Pain     Patient was mopping the floor and hurt her back        HISTORY OF PRESENT ILLNESS:    The patient is a 88 y.o. female with a past medical history of stage III bladder cancer now being surveillance yearly, HTN, HLD, previous abdominal surgery and previous history of bowel obstruction who presents to the hospital with abdominal pain.  Despite triage explaining the patient symptom of back pain.  2 or more right lower quadrant abdominal pain.  The pain radiates to the back.  No numbness or tingling or saddle anesthesia or loss of bowel or bladder control.  She told me that she tolerated breakfast this morning.  She has abdominal pain worse with movement.  She had nausea but no emesis.  Nausea improved with Zofran in the ED.  She was medicated with IV Dilaudid in the emergency room prior to my visit.  CT of the abdomen concerning for ileus.  She also has diverticulosis but no diverticulitis.  Denies chest pain, dyspnea, cough, fever, urinary symptoms.  No diarrhea or melena.    Past Medical History:        Diagnosis Date    Cancer (HCC) 1999    bladder    Hyperlipidemia     Hypertension        Past Surgical History:        Procedure Laterality Date    ABDOMEN SURGERY      large intestine partial removal    APPENDECTOMY      CHOLECYSTECTOMY      HYSTERECTOMY (CERVIX STATUS UNKNOWN)      LAPAROTOMY N/A 11/7/2022    EXPLORATORY LAPAROTOMY LYSIS OF ADHESIONS performed by Marek Sheets MD [facility]       Social History:   Social History     Socioeconomic History    Marital status:      Spouse name: Not on file    Number of children: Not on file    Years of education: Not on file    Highest education level: Not on file   Occupational History    Not on file   Tobacco Use    Smoking status: Never    Smokeless tobacco: Never   Substance and Sexual Activity

## 2024-05-19 NOTE — ED TRIAGE NOTES
Patient present to ED via EMS with a complaint of back pain that radiates to the abdomen.   Patient was given 100 mcg of fentanyl and 4 mg of zofran by EMS.  Patient indicated no relief.  Patient endorses pain of 10/10 and describes pain as sharp and stabbing.   Patient is alert and oriented.  Respirations are even and unlabored.

## 2024-05-20 LAB
ALBUMIN SERPL-MCNC: 3.3 G/DL (ref 3.5–4.6)
ALP SERPL-CCNC: 58 U/L (ref 40–130)
ALT SERPL-CCNC: 17 U/L (ref 0–33)
ANION GAP SERPL CALCULATED.3IONS-SCNC: 9 MEQ/L (ref 9–15)
AST SERPL-CCNC: 21 U/L (ref 0–35)
BASOPHILS # BLD: 0 K/UL (ref 0–0.2)
BASOPHILS NFR BLD: 0.6 %
BILIRUB SERPL-MCNC: 0.4 MG/DL (ref 0.2–0.7)
BUN SERPL-MCNC: 14 MG/DL (ref 8–23)
CALCIUM SERPL-MCNC: 8.8 MG/DL (ref 8.5–9.9)
CHLORIDE SERPL-SCNC: 107 MEQ/L (ref 95–107)
CO2 SERPL-SCNC: 25 MEQ/L (ref 20–31)
CREAT SERPL-MCNC: 0.51 MG/DL (ref 0.5–0.9)
EOSINOPHIL # BLD: 0 K/UL (ref 0–0.7)
EOSINOPHIL NFR BLD: 0.6 %
ERYTHROCYTE [DISTWIDTH] IN BLOOD BY AUTOMATED COUNT: 13.3 % (ref 11.5–14.5)
GLOBULIN SER CALC-MCNC: 1.9 G/DL (ref 2.3–3.5)
GLUCOSE BLD-MCNC: 108 MG/DL (ref 70–99)
GLUCOSE BLD-MCNC: 82 MG/DL (ref 70–99)
GLUCOSE BLD-MCNC: 87 MG/DL (ref 70–99)
GLUCOSE SERPL-MCNC: 102 MG/DL (ref 70–99)
HCT VFR BLD AUTO: 37.8 % (ref 37–47)
HGB BLD-MCNC: 12.5 G/DL (ref 12–16)
LYMPHOCYTES # BLD: 1.8 K/UL (ref 1–4.8)
LYMPHOCYTES NFR BLD: 34.6 %
MCH RBC QN AUTO: 32.8 PG (ref 27–31.3)
MCHC RBC AUTO-ENTMCNC: 33.1 % (ref 33–37)
MCV RBC AUTO: 99.2 FL (ref 79.4–94.8)
MONOCYTES # BLD: 0.6 K/UL (ref 0.2–0.8)
MONOCYTES NFR BLD: 10.4 %
NEUTROPHILS # BLD: 2.8 K/UL (ref 1.4–6.5)
NEUTS SEG NFR BLD: 53.6 %
PERFORMED ON: ABNORMAL
PERFORMED ON: NORMAL
PLATELET # BLD AUTO: 123 K/UL (ref 130–400)
POC CREATININE: 0.6 MG/DL (ref 0.6–1.2)
POC SAMPLE TYPE: NORMAL
POTASSIUM SERPL-SCNC: 4 MEQ/L (ref 3.4–4.9)
PROT SERPL-MCNC: 5.2 G/DL (ref 6.3–8)
RBC # BLD AUTO: 3.81 M/UL (ref 4.2–5.4)
SODIUM SERPL-SCNC: 141 MEQ/L (ref 135–144)
WBC # BLD AUTO: 5.3 K/UL (ref 4.8–10.8)

## 2024-05-20 PROCEDURE — 93005 ELECTROCARDIOGRAM TRACING: CPT | Performed by: INTERNAL MEDICINE

## 2024-05-20 PROCEDURE — 6370000000 HC RX 637 (ALT 250 FOR IP): Performed by: INTERNAL MEDICINE

## 2024-05-20 PROCEDURE — 85025 COMPLETE CBC W/AUTO DIFF WBC: CPT

## 2024-05-20 PROCEDURE — 80053 COMPREHEN METABOLIC PANEL: CPT

## 2024-05-20 PROCEDURE — G0378 HOSPITAL OBSERVATION PER HR: HCPCS

## 2024-05-20 PROCEDURE — 2580000003 HC RX 258: Performed by: INTERNAL MEDICINE

## 2024-05-20 PROCEDURE — 36415 COLL VENOUS BLD VENIPUNCTURE: CPT

## 2024-05-20 RX ORDER — MECOBALAMIN 5000 MCG
5 TABLET,DISINTEGRATING ORAL NIGHTLY
Status: DISCONTINUED | OUTPATIENT
Start: 2024-05-20 | End: 2024-05-23 | Stop reason: HOSPADM

## 2024-05-20 RX ORDER — POLYETHYLENE GLYCOL 3350 17 G/17G
17 POWDER, FOR SOLUTION ORAL ONCE
Status: COMPLETED | OUTPATIENT
Start: 2024-05-20 | End: 2024-05-20

## 2024-05-20 RX ADMIN — PREGABALIN 25 MG: 25 CAPSULE ORAL at 20:48

## 2024-05-20 RX ADMIN — ACETAMINOPHEN 650 MG: 325 TABLET ORAL at 22:21

## 2024-05-20 RX ADMIN — POLYETHYLENE GLYCOL 3350 17 G: 17 POWDER, FOR SOLUTION ORAL at 12:54

## 2024-05-20 RX ADMIN — APIXABAN 2.5 MG: 2.5 TABLET, FILM COATED ORAL at 20:48

## 2024-05-20 RX ADMIN — SODIUM CHLORIDE: 9 INJECTION, SOLUTION INTRAVENOUS at 10:26

## 2024-05-20 RX ADMIN — Medication 10 ML: at 10:24

## 2024-05-20 RX ADMIN — PRAVASTATIN SODIUM 40 MG: 40 TABLET ORAL at 20:48

## 2024-05-20 RX ADMIN — APIXABAN 2.5 MG: 2.5 TABLET, FILM COATED ORAL at 10:24

## 2024-05-20 RX ADMIN — PANTOPRAZOLE SODIUM 40 MG: 40 TABLET, DELAYED RELEASE ORAL at 05:25

## 2024-05-20 RX ADMIN — OXYBUTYNIN CHLORIDE 5 MG: 5 TABLET ORAL at 20:48

## 2024-05-20 RX ADMIN — METOPROLOL TARTRATE 37.5 MG: 25 TABLET, FILM COATED ORAL at 10:21

## 2024-05-20 RX ADMIN — OXYBUTYNIN CHLORIDE 5 MG: 5 TABLET ORAL at 10:24

## 2024-05-20 RX ADMIN — Medication 5 MG: at 22:19

## 2024-05-20 RX ADMIN — ACETAMINOPHEN 650 MG: 325 TABLET ORAL at 00:13

## 2024-05-20 RX ADMIN — PREGABALIN 25 MG: 25 CAPSULE ORAL at 10:21

## 2024-05-20 ASSESSMENT — PAIN DESCRIPTION - DESCRIPTORS: DESCRIPTORS: ACHING;SPASM

## 2024-05-20 ASSESSMENT — PAIN SCALES - GENERAL
PAINLEVEL_OUTOF10: 7
PAINLEVEL_OUTOF10: 6
PAINLEVEL_OUTOF10: 3

## 2024-05-20 ASSESSMENT — PAIN DESCRIPTION - LOCATION
LOCATION: FOOT
LOCATION: FOOT
LOCATION: ABDOMEN;BACK

## 2024-05-20 NOTE — CARE COORDINATION
Case Management Assessment  Initial Evaluation    Date/Time of Evaluation: 5/19/2024 9:37 PM  Assessment Completed by: Carolyn Ross RN    If patient is discharged prior to next notation, then this note serves as note for discharge by case management.    Patient Name: Rossi Cano                   YOB: 1935  Diagnosis: Ileus (HCC) [K56.7]  Intractable abdominal pain [R10.9]                   Date / Time: 5/19/2024  3:22 PM    Patient Admission Status: Observation   Readmission Risk (Low < 19, Mod (19-27), High > 27): No data recorded  Current PCP: Taylor Neumann PA-C  PCP verified by CM? (P) Yes    Chart Reviewed: Yes      History Provided by: (P) Patient  Patient Orientation: (P) Alert and Oriented, Person, Place, Situation, Self    Patient Cognition: (P) Alert    Hospitalization in the last 30 days (Readmission):  No    If yes, Readmission Assessment in  Navigator will be completed.    Advance Directives:      Code Status: Prior   Patient's Primary Decision Maker is: (P) Legal Next of Kin (daughter Rose, son Johann.)    Primary Decision Maker: Akash Cano - Spouse - [phone]    Secondary Decision Maker: Janetteabhilashky,Rose - Child - [phone]    Secondary Decision Maker: NGA CANOAYNE - Child - [phone]    Discharge Planning:    Patient lives with: (P) Spouse/Significant Other, Children Type of Home: (P) House  Primary Care Giver: (P) Self  Patient Support Systems include: (P) Spouse/Significant Other, Children   Current Financial resources: (P) Medicare  Current community resources: (P) Other (Comment) ([facility] through [place] [place]  Current services prior to admission: (P) Home Care            Current DME:              Type of Home Care services:  (P) Nursing Services    ADLS  Prior functional level: (P) Independent in ADLs/IADLs  Current functional level: (P) Independent in ADLs/IADLs    PT AM-PAC:   /24  OT AM-PAC:   /24    Family can provide assistance at DC: (P) Yes  Would you

## 2024-05-20 NOTE — PLAN OF CARE
Problem: Discharge Planning  Goal: Discharge to home or other facility with appropriate resources  Outcome: Progressing  Flowsheets (Taken 5/19/2024 2206)  Discharge to home or other facility with appropriate resources:   Identify barriers to discharge with patient and caregiver   Identify discharge learning needs (meds, wound care, etc)   Refer to discharge planning if patient needs post-hospital services based on physician order or complex needs related to functional status, cognitive ability or social support system   Arrange for needed discharge resources and transportation as appropriate   Arrange for interpreters to assist at discharge as needed

## 2024-05-20 NOTE — ACP (ADVANCE CARE PLANNING)
Advance Care Planning     Advance Care Planning Activator (Inpatient)  Conversation Note      Date of ACP Conversation: 5/19/2024     Conversation Conducted with: Patient with Decision Making Capacity    ACP Activator: Carolyn Ross, RN        Health Care Decision Maker:     Current Designated Health Care Decision Maker:     Primary Decision Maker: Akash Cano - Spouse - [phone]    Secondary Decision Maker: Rose Leyva - Child - [phone]    Secondary Decision Maker: ARTIE CANO - Child - [phone]

## 2024-05-20 NOTE — DISCHARGE INSTRUCTIONS
Follow up with primary care physician in the next 7 days or sooner if needed. If you do not have a Primary care physician, please schedule an appointment with one. Please ask prior to discharge about a list of local providers.     Please return to ER or call 911 if you develop any significant signs or symptoms.    I may not have addressed all of your medical illnesses or the abnormal blood work or imaging therefore please ask your PCP to obtain [facility] record to follow up on all of the abnormal labs, imaging and findings that I have and have not addressed during your hospitalization.     Discharging you from the hospital does not mean that your medical care ends here and now. You may still need additional work up, investigation, monitoring, and treatment to be handled from this point on by outside providers including your PCP, Specialists and other healthcare providers.     For medication questions, contact your retail pharmacy and your PCP.    Your medical team at [facility] appreciates the opportunity to work with you to get well!    Vinh Rod, DO  2:03 PM

## 2024-05-21 PROBLEM — K56.0 PARALYTIC ILEUS (HCC): Status: ACTIVE | Noted: 2024-05-21

## 2024-05-21 LAB
ALBUMIN SERPL-MCNC: 3.2 G/DL (ref 3.5–4.6)
ALP SERPL-CCNC: 60 U/L (ref 40–130)
ALT SERPL-CCNC: 19 U/L (ref 0–33)
ANION GAP SERPL CALCULATED.3IONS-SCNC: 8 MEQ/L (ref 9–15)
AST SERPL-CCNC: 23 U/L (ref 0–35)
BASOPHILS # BLD: 0 K/UL (ref 0–0.2)
BASOPHILS NFR BLD: 0.9 %
BILIRUB SERPL-MCNC: 0.5 MG/DL (ref 0.2–0.7)
BUN SERPL-MCNC: 8 MG/DL (ref 8–23)
CALCIUM SERPL-MCNC: 8.7 MG/DL (ref 8.5–9.9)
CHLORIDE SERPL-SCNC: 110 MEQ/L (ref 95–107)
CO2 SERPL-SCNC: 26 MEQ/L (ref 20–31)
CREAT SERPL-MCNC: 0.51 MG/DL (ref 0.5–0.9)
EKG ATRIAL RATE: 416 BPM
EKG ATRIAL RATE: 81 BPM
EKG Q-T INTERVAL: 436 MS
EKG Q-T INTERVAL: 442 MS
EKG QRS DURATION: 78 MS
EKG QRS DURATION: 82 MS
EKG QTC CALCULATION (BAZETT): 442 MS
EKG QTC CALCULATION (BAZETT): 486 MS
EKG R AXIS: 36 DEGREES
EKG R AXIS: 54 DEGREES
EKG T AXIS: 140 DEGREES
EKG T AXIS: 202 DEGREES
EKG VENTRICULAR RATE: 62 BPM
EKG VENTRICULAR RATE: 73 BPM
EOSINOPHIL # BLD: 0.1 K/UL (ref 0–0.7)
EOSINOPHIL NFR BLD: 1.6 %
ERYTHROCYTE [DISTWIDTH] IN BLOOD BY AUTOMATED COUNT: 13.2 % (ref 11.5–14.5)
GLOBULIN SER CALC-MCNC: 2 G/DL (ref 2.3–3.5)
GLUCOSE SERPL-MCNC: 89 MG/DL (ref 70–99)
HCT VFR BLD AUTO: 39.2 % (ref 37–47)
HGB BLD-MCNC: 12.9 G/DL (ref 12–16)
LYMPHOCYTES # BLD: 1.7 K/UL (ref 1–4.8)
LYMPHOCYTES NFR BLD: 39.9 %
MCH RBC QN AUTO: 32.7 PG (ref 27–31.3)
MCHC RBC AUTO-ENTMCNC: 32.9 % (ref 33–37)
MCV RBC AUTO: 99.5 FL (ref 79.4–94.8)
MONOCYTES # BLD: 0.5 K/UL (ref 0.2–0.8)
MONOCYTES NFR BLD: 10.8 %
NEUTROPHILS # BLD: 2 K/UL (ref 1.4–6.5)
NEUTS SEG NFR BLD: 46.6 %
PLATELET # BLD AUTO: 124 K/UL (ref 130–400)
POTASSIUM SERPL-SCNC: 3.8 MEQ/L (ref 3.4–4.9)
PROT SERPL-MCNC: 5.2 G/DL (ref 6.3–8)
RBC # BLD AUTO: 3.94 M/UL (ref 4.2–5.4)
SODIUM SERPL-SCNC: 144 MEQ/L (ref 135–144)
WBC # BLD AUTO: 4.4 K/UL (ref 4.8–10.8)

## 2024-05-21 PROCEDURE — 93005 ELECTROCARDIOGRAM TRACING: CPT | Performed by: INTERNAL MEDICINE

## 2024-05-21 PROCEDURE — 99223 1ST HOSP IP/OBS HIGH 75: CPT | Performed by: INTERNAL MEDICINE

## 2024-05-21 PROCEDURE — 1210000000 HC MED SURG R&B

## 2024-05-21 PROCEDURE — G0378 HOSPITAL OBSERVATION PER HR: HCPCS

## 2024-05-21 PROCEDURE — 85025 COMPLETE CBC W/AUTO DIFF WBC: CPT

## 2024-05-21 PROCEDURE — 6360000002 HC RX W HCPCS: Performed by: INTERNAL MEDICINE

## 2024-05-21 PROCEDURE — 99221 1ST HOSP IP/OBS SF/LOW 40: CPT | Performed by: COLON & RECTAL SURGERY

## 2024-05-21 PROCEDURE — 6370000000 HC RX 637 (ALT 250 FOR IP): Performed by: INTERNAL MEDICINE

## 2024-05-21 PROCEDURE — 36415 COLL VENOUS BLD VENIPUNCTURE: CPT

## 2024-05-21 PROCEDURE — 2580000003 HC RX 258: Performed by: INTERNAL MEDICINE

## 2024-05-21 PROCEDURE — 80053 COMPREHEN METABOLIC PANEL: CPT

## 2024-05-21 RX ORDER — LOSARTAN POTASSIUM 50 MG/1
50 TABLET ORAL 2 TIMES DAILY
Status: DISCONTINUED | OUTPATIENT
Start: 2024-05-21 | End: 2024-05-23 | Stop reason: HOSPADM

## 2024-05-21 RX ORDER — ENOXAPARIN SODIUM 100 MG/ML
1 INJECTION SUBCUTANEOUS 2 TIMES DAILY
Status: DISCONTINUED | OUTPATIENT
Start: 2024-05-21 | End: 2024-05-23

## 2024-05-21 RX ORDER — BISACODYL 5 MG/1
5 TABLET, DELAYED RELEASE ORAL DAILY PRN
Status: DISCONTINUED | OUTPATIENT
Start: 2024-05-21 | End: 2024-05-23 | Stop reason: HOSPADM

## 2024-05-21 RX ORDER — DOCUSATE SODIUM 100 MG/1
100 CAPSULE, LIQUID FILLED ORAL DAILY
Status: DISCONTINUED | OUTPATIENT
Start: 2024-05-21 | End: 2024-05-23 | Stop reason: HOSPADM

## 2024-05-21 RX ADMIN — OXYBUTYNIN CHLORIDE 5 MG: 5 TABLET ORAL at 22:18

## 2024-05-21 RX ADMIN — ENOXAPARIN SODIUM 60 MG: 100 INJECTION SUBCUTANEOUS at 22:17

## 2024-05-21 RX ADMIN — ACETAMINOPHEN 650 MG: 325 TABLET ORAL at 04:38

## 2024-05-21 RX ADMIN — PRAVASTATIN SODIUM 40 MG: 40 TABLET ORAL at 22:19

## 2024-05-21 RX ADMIN — ACETAMINOPHEN 650 MG: 325 TABLET ORAL at 22:18

## 2024-05-21 RX ADMIN — SODIUM CHLORIDE: 9 INJECTION, SOLUTION INTRAVENOUS at 14:32

## 2024-05-21 RX ADMIN — PREGABALIN 25 MG: 25 CAPSULE ORAL at 08:53

## 2024-05-21 RX ADMIN — LOSARTAN POTASSIUM 50 MG: 50 TABLET, FILM COATED ORAL at 22:18

## 2024-05-21 RX ADMIN — OXYBUTYNIN CHLORIDE 5 MG: 5 TABLET ORAL at 08:53

## 2024-05-21 RX ADMIN — PANTOPRAZOLE SODIUM 40 MG: 40 TABLET, DELAYED RELEASE ORAL at 06:06

## 2024-05-21 RX ADMIN — SODIUM CHLORIDE: 9 INJECTION, SOLUTION INTRAVENOUS at 00:07

## 2024-05-21 RX ADMIN — APIXABAN 2.5 MG: 2.5 TABLET, FILM COATED ORAL at 08:53

## 2024-05-21 RX ADMIN — LOSARTAN POTASSIUM 50 MG: 50 TABLET, FILM COATED ORAL at 10:32

## 2024-05-21 RX ADMIN — Medication 5 MG: at 22:17

## 2024-05-21 RX ADMIN — DOCUSATE SODIUM 100 MG: 100 CAPSULE, LIQUID FILLED ORAL at 13:50

## 2024-05-21 RX ADMIN — PREGABALIN 25 MG: 25 CAPSULE ORAL at 22:18

## 2024-05-21 ASSESSMENT — ENCOUNTER SYMPTOMS
STRIDOR: 0
NAUSEA: 0
ABDOMINAL PAIN: 1
COUGH: 0
BLOOD IN STOOL: 0
EYES NEGATIVE: 1
CHEST TIGHTNESS: 0
BACK PAIN: 1
SHORTNESS OF BREATH: 0
RESPIRATORY NEGATIVE: 1
WHEEZING: 0

## 2024-05-21 ASSESSMENT — PAIN SCALES - GENERAL
PAINLEVEL_OUTOF10: 0
PAINLEVEL_OUTOF10: 6
PAINLEVEL_OUTOF10: 6

## 2024-05-21 ASSESSMENT — PAIN DESCRIPTION - LOCATION
LOCATION: BACK;GROIN
LOCATION: BACK

## 2024-05-21 ASSESSMENT — PAIN DESCRIPTION - ORIENTATION: ORIENTATION: RIGHT;LEFT;MID

## 2024-05-21 ASSESSMENT — PAIN DESCRIPTION - DESCRIPTORS: DESCRIPTORS: DISCOMFORT;SHARP

## 2024-05-21 NOTE — CONSULTS
Department of General Surgery - Adult  Surgical Service general surgery  Attending Consult Note      Reason for Consult: Back pain radiating to her abdomen      CHIEF COMPLAINT: Back pain radiating to her abdomen/constipation    History Obtained From:  patient, electronic medical record    HISTORY OF PRESENT ILLNESS:                The patient is a 88 y.o. female who presents with back pain radiating to her abdomen.  She has been constipated over the past few days.    I reviewed her CAT scan through the emergency department.  No significant distention of her small bowel.    I did a lysis of adhesions back in November 2022.    Denies nausea or vomiting    Past Medical History:        Diagnosis Date    Cancer (HCC) 1999    bladder    Hyperlipidemia     Hypertension      Past Surgical History:        Procedure Laterality Date    ABDOMEN SURGERY      large intestine partial removal    APPENDECTOMY      CHOLECYSTECTOMY      HYSTERECTOMY (CERVIX STATUS UNKNOWN)      LAPAROTOMY N/A 11/7/2022    EXPLORATORY LAPAROTOMY LYSIS OF ADHESIONS performed by Marek Sheets MD at [facility] [place]     Current Medications:   Current Facility-Administered Medications: losartan (COZAAR) tablet 50 mg, 50 mg, Oral, BID  enoxaparin (LOVENOX) injection 60 mg, 1 mg/kg, SubCUTAneous, BID  docusate sodium (COLACE) capsule 100 mg, 100 mg, Oral, Daily  bisacodyl (DULCOLAX) EC tablet 5 mg, 5 mg, Oral, Daily PRN  melatonin disintegrating tablet 5 mg, 5 mg, Oral, Nightly  sodium chloride flush 0.9 % injection 5-40 mL, 5-40 mL, IntraVENous, 2 times per day  sodium chloride flush 0.9 % injection 5-40 mL, 5-40 mL, IntraVENous, PRN  0.9 % sodium chloride infusion, , IntraVENous, PRN  potassium chloride (KLOR-CON M) extended release tablet 40 mEq, 40 mEq, Oral, PRN **OR** potassium bicarb-citric acid (EFFER-K) effervescent tablet 40 mEq, 40 mEq, Oral, PRN **OR** potassium chloride 10 mEq/100 mL IVPB (Peripheral Line), 10 mEq, IntraVENous, PRN  magnesium

## 2024-05-21 NOTE — PLAN OF CARE
Problem: Discharge Planning  Goal: Discharge to home or other facility with appropriate resources  5/21/2024 1210 by Elena Wilder RN  Outcome: Progressing  5/20/2024 2310 by Walt Stein RN  Outcome: Progressing  5/20/2024 2308 by Walt Stein RN  Outcome: Progressing  Flowsheets (Taken 5/20/2024 1020 by Kaleigh Keller, RN)  Discharge to home or other facility with appropriate resources: Identify barriers to discharge with patient and caregiver     Problem: Pain  Goal: Verbalizes/displays adequate comfort level or baseline comfort level  5/21/2024 1210 by Elena Wilder RN  Outcome: Progressing  5/20/2024 2310 by Walt Stein RN  Outcome: Progressing  5/20/2024 2308 by Walt Stein RN  Outcome: Progressing     Problem: Safety - Adult  Goal: Free from fall injury  5/21/2024 1210 by Elena Wilder RN  Outcome: Progressing  5/20/2024 2310 by Walt Stein RN  Outcome: Progressing  5/20/2024 2308 by Walt Stein RN  Outcome: Progressing     Problem: ABCDS Injury Assessment  Goal: Absence of physical injury  5/21/2024 1210 by Elena Wilder RN  Outcome: Progressing  5/20/2024 2310 by Walt Stein RN  Outcome: Progressing  5/20/2024 2308 by Watl Stein RN  Outcome: Progressing

## 2024-05-21 NOTE — PLAN OF CARE
Problem: Discharge Planning  Goal: Discharge to home or other facility with appropriate resources  5/20/2024 2310 by Walt Stein RN  Outcome: Progressing  5/20/2024 2308 by Walt Stein RN  Outcome: Progressing  Flowsheets (Taken 5/20/2024 1020 by Kaleigh Keller, RN)  Discharge to home or other facility with appropriate resources: Identify barriers to discharge with patient and caregiver     Problem: Pain  Goal: Verbalizes/displays adequate comfort level or baseline comfort level  5/20/2024 2310 by Walt Stein RN  Outcome: Progressing  5/20/2024 2308 by Walt Stein RN  Outcome: Progressing     Problem: Safety - Adult  Goal: Free from fall injury  5/20/2024 2310 by Walt Stein RN  Outcome: Progressing  5/20/2024 2308 by Walt Stein RN  Outcome: Progressing     Problem: ABCDS Injury Assessment  Goal: Absence of physical injury  5/20/2024 2310 by Walt Stein RN  Outcome: Progressing  5/20/2024 2308 by Walt Stein RN  Outcome: Progressing

## 2024-05-21 NOTE — PLAN OF CARE
Problem: Discharge Planning  Goal: Discharge to home or other facility with appropriate resources  Outcome: Progressing  Flowsheets (Taken 5/20/2024 1020 by Kaleigh Keller, RN)  Discharge to home or other facility with appropriate resources: Identify barriers to discharge with patient and caregiver     Problem: Pain  Goal: Verbalizes/displays adequate comfort level or baseline comfort level  Outcome: Progressing     Problem: Safety - Adult  Goal: Free from fall injury  Outcome: Progressing     Problem: ABCDS Injury Assessment  Goal: Absence of physical injury  Outcome: Progressing

## 2024-05-21 NOTE — CONSULTS
Inpatient consult to Cardiology  Consult performed by: Edgard Hein MD  Consult ordered by: Jenny Wright DO          Patient Name: Rossi Chinchilla  Admit Date: 2024  3:22 PM  MR #: [id number]  : 1935    Attending Physician: Vinh Rod DO  Reason for consult:  AF Linden    History of Presenting Illness:      Rossi Chinchilla is a 88 y.o. female on hospital day 0 with a history of .   History Obtained From:  patient, electronic medical record    Admitted with Back and ABD pain. Work up is in progress.   Pt has persistent AF and on rate control and low dose BB.  We are asked to evaluate for Bradycardia.    Apparently pt was not hooked up to Telemetry yet and by Bedside monitor HR dropped down to 20s by Nursing staff. Apparently at that time pt was asymptomatic.      She is alert and oriented this am. Denies CP nor SOB.  She knows that she has AF but never been told of low HR.   BB since held last PM.    Pt has no known CAD.  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  Pt has Anterior and Lateran TWI but this appears to be chronic reviewing prior [facility] ECGs.     She has stable stage 3 Bladder CA  History:      EKG:  Past Medical History:   Diagnosis Date    Cancer (HCC)     bladder    Hyperlipidemia     Hypertension      Past Surgical History:   Procedure Laterality Date    ABDOMEN SURGERY      large intestine partial removal    APPENDECTOMY      CHOLECYSTECTOMY      HYSTERECTOMY (CERVIX STATUS UNKNOWN)      LAPAROTOMY N/A 2022    EXPLORATORY LAPAROTOMY LYSIS OF ADHESIONS performed by Marek Sheets MD at [facility] OR       Family History  Family History   Problem Relation Age of Onset    Coronary Art Dis Brother      [] Unable to obtain due to ventilated and/ or neurologic status    Social History     Socioeconomic History    Marital status:      Spouse name: Not on file    Number of children: Not on file    Years of

## 2024-05-21 NOTE — SIGNIFICANT EVENT
Notified by nursing that patient was observed to have asymptomatic apparent bradycardia briefly as low as the 20s-30s.  She is in atrial fibrillation.  She was not placed on cardiac telemetry on arrival, as it does not appear that there was any concern or noted bradycardia at that time.  Will initiate cardiac telemetry and obtain twelve-lead EKG now.  Previously ordered metoprolol tartrate now held.  Cardiology consult placed.    Electronically signed by Jenny Wright DO on 5/20/2024 at 9:44 PM

## 2024-05-22 ENCOUNTER — APPOINTMENT (OUTPATIENT)
Dept: GENERAL RADIOLOGY | Age: 89
DRG: 389 | End: 2024-05-22
Payer: MEDICARE

## 2024-05-22 LAB
ALBUMIN SERPL-MCNC: 3.2 G/DL (ref 3.5–4.6)
ALP SERPL-CCNC: 67 U/L (ref 40–130)
ALT SERPL-CCNC: 25 U/L (ref 0–33)
ANION GAP SERPL CALCULATED.3IONS-SCNC: 11 MEQ/L (ref 9–15)
AST SERPL-CCNC: 26 U/L (ref 0–35)
BASOPHILS # BLD: 0 K/UL (ref 0–0.2)
BASOPHILS NFR BLD: 0.8 %
BILIRUB SERPL-MCNC: 0.6 MG/DL (ref 0.2–0.7)
BUN SERPL-MCNC: 6 MG/DL (ref 8–23)
CALCIUM SERPL-MCNC: 8.7 MG/DL (ref 8.5–9.9)
CHLORIDE SERPL-SCNC: 108 MEQ/L (ref 95–107)
CO2 SERPL-SCNC: 25 MEQ/L (ref 20–31)
CREAT SERPL-MCNC: 0.48 MG/DL (ref 0.5–0.9)
EOSINOPHIL # BLD: 0.1 K/UL (ref 0–0.7)
EOSINOPHIL NFR BLD: 1.4 %
ERYTHROCYTE [DISTWIDTH] IN BLOOD BY AUTOMATED COUNT: 13 % (ref 11.5–14.5)
GLOBULIN SER CALC-MCNC: 2.2 G/DL (ref 2.3–3.5)
GLUCOSE SERPL-MCNC: 99 MG/DL (ref 70–99)
HCT VFR BLD AUTO: 41.8 % (ref 37–47)
HGB BLD-MCNC: 13.9 G/DL (ref 12–16)
LYMPHOCYTES # BLD: 1.8 K/UL (ref 1–4.8)
LYMPHOCYTES NFR BLD: 34.6 %
MCH RBC QN AUTO: 32.8 PG (ref 27–31.3)
MCHC RBC AUTO-ENTMCNC: 33.3 % (ref 33–37)
MCV RBC AUTO: 98.6 FL (ref 79.4–94.8)
MONOCYTES # BLD: 0.6 K/UL (ref 0.2–0.8)
MONOCYTES NFR BLD: 11.3 %
NEUTROPHILS # BLD: 2.6 K/UL (ref 1.4–6.5)
NEUTS SEG NFR BLD: 51.7 %
PLATELET # BLD AUTO: 146 K/UL (ref 130–400)
POTASSIUM SERPL-SCNC: 3.8 MEQ/L (ref 3.4–4.9)
PROT SERPL-MCNC: 5.4 G/DL (ref 6.3–8)
RBC # BLD AUTO: 4.24 M/UL (ref 4.2–5.4)
SODIUM SERPL-SCNC: 144 MEQ/L (ref 135–144)
WBC # BLD AUTO: 5.1 K/UL (ref 4.8–10.8)

## 2024-05-22 PROCEDURE — 6360000004 HC RX CONTRAST MEDICATION: Performed by: COLON & RECTAL SURGERY

## 2024-05-22 PROCEDURE — 6360000002 HC RX W HCPCS: Performed by: INTERNAL MEDICINE

## 2024-05-22 PROCEDURE — 6370000000 HC RX 637 (ALT 250 FOR IP): Performed by: INTERNAL MEDICINE

## 2024-05-22 PROCEDURE — 2580000003 HC RX 258: Performed by: INTERNAL MEDICINE

## 2024-05-22 PROCEDURE — 99232 SBSQ HOSP IP/OBS MODERATE 35: CPT | Performed by: INTERNAL MEDICINE

## 2024-05-22 PROCEDURE — 85025 COMPLETE CBC W/AUTO DIFF WBC: CPT

## 2024-05-22 PROCEDURE — 74250 X-RAY XM SM INT 1CNTRST STD: CPT

## 2024-05-22 PROCEDURE — 80053 COMPREHEN METABOLIC PANEL: CPT

## 2024-05-22 PROCEDURE — 1210000000 HC MED SURG R&B

## 2024-05-22 PROCEDURE — 36415 COLL VENOUS BLD VENIPUNCTURE: CPT

## 2024-05-22 RX ADMIN — PRAVASTATIN SODIUM 40 MG: 40 TABLET ORAL at 21:58

## 2024-05-22 RX ADMIN — DOCUSATE SODIUM 100 MG: 100 CAPSULE, LIQUID FILLED ORAL at 13:04

## 2024-05-22 RX ADMIN — ENOXAPARIN SODIUM 60 MG: 100 INJECTION SUBCUTANEOUS at 13:07

## 2024-05-22 RX ADMIN — METOPROLOL TARTRATE 37.5 MG: 25 TABLET, FILM COATED ORAL at 13:04

## 2024-05-22 RX ADMIN — METOPROLOL TARTRATE 37.5 MG: 25 TABLET, FILM COATED ORAL at 09:00

## 2024-05-22 RX ADMIN — PREGABALIN 25 MG: 25 CAPSULE ORAL at 13:04

## 2024-05-22 RX ADMIN — PREGABALIN 25 MG: 25 CAPSULE ORAL at 21:58

## 2024-05-22 RX ADMIN — Medication 10 ML: at 13:20

## 2024-05-22 RX ADMIN — METOPROLOL TARTRATE 37.5 MG: 25 TABLET, FILM COATED ORAL at 21:58

## 2024-05-22 RX ADMIN — Medication 5 MG: at 21:59

## 2024-05-22 RX ADMIN — ENOXAPARIN SODIUM 60 MG: 100 INJECTION SUBCUTANEOUS at 21:58

## 2024-05-22 RX ADMIN — OXYBUTYNIN CHLORIDE 5 MG: 5 TABLET ORAL at 13:04

## 2024-05-22 RX ADMIN — DIATRIZOATE MEGLUMINE AND DIATRIZOATE SODIUM 240 ML: 660; 100 LIQUID ORAL; RECTAL at 09:39

## 2024-05-22 RX ADMIN — LOSARTAN POTASSIUM 50 MG: 50 TABLET, FILM COATED ORAL at 13:03

## 2024-05-22 RX ADMIN — LOSARTAN POTASSIUM 50 MG: 50 TABLET, FILM COATED ORAL at 21:59

## 2024-05-22 RX ADMIN — Medication 10 ML: at 21:59

## 2024-05-22 RX ADMIN — MORPHINE SULFATE 2 MG: 2 INJECTION, SOLUTION INTRAMUSCULAR; INTRAVENOUS at 07:01

## 2024-05-22 RX ADMIN — OXYBUTYNIN CHLORIDE 5 MG: 5 TABLET ORAL at 21:59

## 2024-05-22 ASSESSMENT — ENCOUNTER SYMPTOMS
EYES NEGATIVE: 1
WHEEZING: 0
STRIDOR: 0
BLOOD IN STOOL: 0
NAUSEA: 0
BACK PAIN: 1
CHEST TIGHTNESS: 0
RESPIRATORY NEGATIVE: 1
ABDOMINAL PAIN: 1
SHORTNESS OF BREATH: 0
COUGH: 0

## 2024-05-22 ASSESSMENT — PAIN SCALES - GENERAL
PAINLEVEL_OUTOF10: 0
PAINLEVEL_OUTOF10: 6
PAINLEVEL_OUTOF10: 0

## 2024-05-22 ASSESSMENT — PAIN DESCRIPTION - LOCATION: LOCATION: BACK;ABDOMEN

## 2024-05-22 NOTE — PLAN OF CARE
Problem: Discharge Planning  Goal: Discharge to home or other facility with appropriate resources  5/22/2024 0103 by Walt Stein RN  Outcome: Progressing  5/21/2024 1210 by Elena Wilder RN  Outcome: Progressing     Problem: Pain  Goal: Verbalizes/displays adequate comfort level or baseline comfort level  5/22/2024 0103 by Walt Stein RN  Outcome: Progressing  5/21/2024 1210 by Elena Wilder RN  Outcome: Progressing     Problem: Safety - Adult  Goal: Free from fall injury  5/22/2024 0103 by Walt Stein RN  Outcome: Progressing  5/21/2024 1210 by Elena Wilder RN  Outcome: Progressing     Problem: ABCDS Injury Assessment  Goal: Absence of physical injury  5/22/2024 0103 by Walt Stein RN  Outcome: Progressing  5/21/2024 1210 by Elena Wilder RN  Outcome: Progressing

## 2024-05-22 NOTE — PLAN OF CARE
Problem: Discharge Planning  Goal: Discharge to home or other facility with appropriate resources  5/22/2024 0939 by Elena Wilder RN  Outcome: Progressing  5/22/2024 0103 by Walt Stein RN  Outcome: Progressing     Problem: Pain  Goal: Verbalizes/displays adequate comfort level or baseline comfort level  5/22/2024 0939 by Elena Wilder RN  Outcome: Progressing  5/22/2024 0103 by Walt Stein RN  Outcome: Progressing     Problem: Safety - Adult  Goal: Free from fall injury  5/22/2024 0939 by Elena Wilder RN  Outcome: Progressing  5/22/2024 0103 by Walt Stein RN  Outcome: Progressing     Problem: ABCDS Injury Assessment  Goal: Absence of physical injury  5/22/2024 0939 by Elena Wilder RN  Outcome: Progressing  5/22/2024 0103 by Walt Stein RN  Outcome: Progressing

## 2024-05-23 VITALS
WEIGHT: 130 LBS | DIASTOLIC BLOOD PRESSURE: 71 MMHG | BODY MASS INDEX: 23.92 KG/M2 | RESPIRATION RATE: 16 BRPM | HEART RATE: 70 BPM | SYSTOLIC BLOOD PRESSURE: 138 MMHG | TEMPERATURE: 98.1 F | HEIGHT: 62 IN | OXYGEN SATURATION: 98 %

## 2024-05-23 LAB
BASOPHILS # BLD: 0 K/UL (ref 0–0.2)
BASOPHILS NFR BLD: 0.6 %
EKG ATRIAL RATE: 55 BPM
EKG Q-T INTERVAL: 430 MS
EKG QRS DURATION: 72 MS
EKG QTC CALCULATION (BAZETT): 483 MS
EKG R AXIS: 55 DEGREES
EKG T AXIS: 203 DEGREES
EKG VENTRICULAR RATE: 76 BPM
EOSINOPHIL # BLD: 0 K/UL (ref 0–0.7)
EOSINOPHIL NFR BLD: 0.6 %
ERYTHROCYTE [DISTWIDTH] IN BLOOD BY AUTOMATED COUNT: 13.2 % (ref 11.5–14.5)
HCT VFR BLD AUTO: 40.5 % (ref 37–47)
HGB BLD-MCNC: 13.7 G/DL (ref 12–16)
LYMPHOCYTES # BLD: 1.7 K/UL (ref 1–4.8)
LYMPHOCYTES NFR BLD: 31.9 %
MCH RBC QN AUTO: 32.7 PG (ref 27–31.3)
MCHC RBC AUTO-ENTMCNC: 33.8 % (ref 33–37)
MCV RBC AUTO: 96.7 FL (ref 79.4–94.8)
MONOCYTES # BLD: 0.6 K/UL (ref 0.2–0.8)
MONOCYTES NFR BLD: 11 %
NEUTROPHILS # BLD: 2.9 K/UL (ref 1.4–6.5)
NEUTS SEG NFR BLD: 55.7 %
PLATELET # BLD AUTO: 177 K/UL (ref 130–400)
RBC # BLD AUTO: 4.19 M/UL (ref 4.2–5.4)
REASON FOR REJECTION: NORMAL
REJECTED TEST: NORMAL
WBC # BLD AUTO: 5.2 K/UL (ref 4.8–10.8)

## 2024-05-23 PROCEDURE — 6370000000 HC RX 637 (ALT 250 FOR IP): Performed by: INTERNAL MEDICINE

## 2024-05-23 PROCEDURE — 36415 COLL VENOUS BLD VENIPUNCTURE: CPT

## 2024-05-23 PROCEDURE — 99233 SBSQ HOSP IP/OBS HIGH 50: CPT | Performed by: INTERNAL MEDICINE

## 2024-05-23 PROCEDURE — 85025 COMPLETE CBC W/AUTO DIFF WBC: CPT

## 2024-05-23 PROCEDURE — 2580000003 HC RX 258: Performed by: INTERNAL MEDICINE

## 2024-05-23 RX ORDER — LOSARTAN POTASSIUM 50 MG/1
50 TABLET ORAL 2 TIMES DAILY
Qty: 30 TABLET | Refills: 3 | Status: SHIPPED | OUTPATIENT
Start: 2024-05-23

## 2024-05-23 RX ADMIN — PANTOPRAZOLE SODIUM 40 MG: 40 TABLET, DELAYED RELEASE ORAL at 05:55

## 2024-05-23 RX ADMIN — Medication 10 ML: at 09:03

## 2024-05-23 RX ADMIN — APIXABAN 2.5 MG: 2.5 TABLET, FILM COATED ORAL at 09:02

## 2024-05-23 RX ADMIN — LOSARTAN POTASSIUM 50 MG: 50 TABLET, FILM COATED ORAL at 09:03

## 2024-05-23 RX ADMIN — DOCUSATE SODIUM 100 MG: 100 CAPSULE, LIQUID FILLED ORAL at 09:03

## 2024-05-23 RX ADMIN — OXYBUTYNIN CHLORIDE 5 MG: 5 TABLET ORAL at 09:02

## 2024-05-23 RX ADMIN — PREGABALIN 25 MG: 25 CAPSULE ORAL at 09:02

## 2024-05-23 RX ADMIN — METOPROLOL TARTRATE 37.5 MG: 25 TABLET, FILM COATED ORAL at 09:02

## 2024-05-23 ASSESSMENT — PAIN SCALES - GENERAL: PAINLEVEL_OUTOF10: 0

## 2024-05-23 ASSESSMENT — ENCOUNTER SYMPTOMS
EYES NEGATIVE: 1
RESPIRATORY NEGATIVE: 1
COUGH: 0
CHEST TIGHTNESS: 0
BLOOD IN STOOL: 0
STRIDOR: 0
BACK PAIN: 1
ABDOMINAL PAIN: 1
WHEEZING: 0
NAUSEA: 0
SHORTNESS OF BREATH: 0

## 2024-05-23 NOTE — PROGRESS NOTES
PROGRESS NOTE    Patient Name: Rossi Chinchilla  Admit Date: 2024  3:22 PM  MR #: [id number]  : 1935    Attending Physician: Vinh Rod DO  Reason for consult:  AF Linden    History of Presenting Illness:      Rossi Chinchilla is a 88 y.o. female on hospital day 1 with a history of .   History Obtained From:  patient, electronic medical record    Admitted with Back and ABD pain. Work up is in progress.   Pt has persistent AF and on rate control w low dose BB.  We are asked to evaluate for Bradycardia.    Apparently pt was not hooked up to Telemetry yet and by Bedside monitor HR dropped down to 20s by Nursing staff. Apparently at that time pt was asymptomatic.      She is alert and oriented this am. Denies CP nor SOB.  She knows that she has AF but never been told of low HR.   BB since held last PM.    Pt has no known CAD.  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  Pt has Anterior and Lateral TWI but this appears to be chronic reviewing prior [facility] ECGs.     She has stable stage 3 Bladder CA    24 Tele AF 78 no Linden nor pauses reported. She down for Small Bowel follow through study  History:      EKG:  Past Medical History:   Diagnosis Date    Cancer (HCC)     bladder    Hyperlipidemia     Hypertension      Past Surgical History:   Procedure Laterality Date    ABDOMEN SURGERY      large intestine partial removal    APPENDECTOMY      CHOLECYSTECTOMY      HYSTERECTOMY (CERVIX STATUS UNKNOWN)      LAPAROTOMY N/A 2022    EXPLORATORY LAPAROTOMY LYSIS OF ADHESIONS performed by Marek Sheets MD at [facility]       Family History  Family History   Problem Relation Age of Onset    Coronary Art Dis Brother      [] Unable to obtain due to ventilated and/ or neurologic status    Social History     Socioeconomic History    Marital status:      Spouse name: Not on file    Number of children: Not on file    Years of education: 
   PROGRESS NOTE    Patient Name: Rossi Chinchilla  Admit Date: 2024  3:22 PM  MR #: [id number]  : 1935    Attending Physician: Vinh Rod DO  Reason for consult:  AF Linden    History of Presenting Illness:      Rossi Chinchilla is a 88 y.o. female on hospital day 2 with a history of .   History Obtained From:  patient, electronic medical record    Admitted with Back and ABD pain. Work up is in progress.   Pt has persistent AF and on rate control w low dose BB.  We are asked to evaluate for Bradycardia.    Apparently pt was not hooked up to Telemetry yet and by Bedside monitor HR dropped down to 20s by Nursing staff. Apparently at that time pt was asymptomatic.      She is alert and oriented this am. Denies CP nor SOB.  She knows that she has AF but never been told of low HR.   BB since held last PM.    Pt has no known CAD.  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  Pt has Anterior and Lateral TWI but this appears to be chronic reviewing prior [facility] ECGs.     She has stable stage 3 Bladder CA    24 Tele AF 78 no Linden nor pauses reported. She down for Small Bowel follow through study    24 Tele AF 71. No Bradyarrhythmias noted. Tolerating BB dose. Feels better. Minimal Abd discomfort. Eating a regular diet now.   History:      EKG:  Past Medical History:   Diagnosis Date    Cancer (HCC)     bladder    Hyperlipidemia     Hypertension      Past Surgical History:   Procedure Laterality Date    ABDOMEN SURGERY      large intestine partial removal    APPENDECTOMY      CHOLECYSTECTOMY      HYSTERECTOMY (CERVIX STATUS UNKNOWN)      LAPAROTOMY N/A 2022    EXPLORATORY LAPAROTOMY LYSIS OF ADHESIONS performed [place] [facility] [place]       Family History  Family History   Problem Relation Age of Onset    Coronary Art Dis Brother      [] Unable to obtain due to ventilated and/ or neurologic status    Social History 
Discharge instructions reviewed with pt, all questions answered. All belongings sent with pt. Spouse to transport pt home via car.Electronically signed by Heather Russell RN on 5/23/2024 at 1:36 PM   
Internal Medicine   Hospitalist   Progress Note    2024   1:58 PM    Name:  Rossi Chinchilla  MRN:    [id number]     IP Day: 1     Admit Date: 2024  3:22 PM  PCP: Taylor Neumann PA-C    Code Status:  Full Code    Assessment and Plan:        Active Problems/ diagnosis:       Ileus-small bowel follow-through did not show obstruction persistent A-fib with concern for bradycardic episode-no evidence in therapy so far.  Hearing loss  HTN  HLD  History of bladder cancer-no recurrence, gets surveillance yearly     Plan  SBFT no obstruction-surgery is following  Seen by cardiologist for possible bradycardic episode last night.  Beta-blocker as per cardiologist.  She is being monitored on telemetry.  Eliquis was held and she was started on Lovenox by cardiologist.  IVF  Clear liquid diet  As needed pain control with IV morphine-try to minimize  Zoom home meds as ordered  Full code  Discussed plan of care with patient and her family at bedside.  Discussed with RN       7 pm- 7 am, please contact on call Hospitalist for any needs     Dispo-hoping to DC tomorrow if she remains stable    Subjective:     SBFT done this morning, no obstruction noted.  Physical Examination:      Vitals:  /72   Pulse 72   Temp 97.7 °F (36.5 °C) (Oral)   Resp 18   Ht 1.575 m (5' 2\")   Wt 59 kg (130 lb)   SpO2 96%   BMI 23.78 kg/m²   Temp (24hrs), Av.7 °F (36.5 °C), Min:97.5 °F (36.4 °C), Max:97.7 °F (36.5 °C)      General appearance: alert, cooperative and no distress  Mental Status: oriented to person, place and time and normal affect  Lungs: clear to auscultation bilaterally, normal effort  Heart: regular rate and rhythm, no murmur  Abdomen: soft, mild tenderness to palpation in the right side of her abdomen, nondistended, bowel sounds present, no masses  Extremities: no edema, redness, tenderness in the calves  Skin: no gross lesions, rashes    Data:     Labs:  Recent Labs     24  0412 24  0432   WBC 4.4* 5.1 
Internal Medicine   Hospitalist   Progress Note    2024   2:03 PM    Name:  Rossi Chinchilla  MRN:    [id number]     IP Day: 0     Admit Date: 2024  3:22 PM  PCP: Taylor Neumann PA-C    Code Status:  Full Code    Assessment and Plan:        Active Problems/ diagnosis:       Ileus-risks include previous SBO, previous abdominal surgery and history of bladder cancer  Hearing loss  HTN  HLD  History of bladder cancer-no recurrence, gets surveillance yearly     Plan  Instructed RN to ambulate the patient 3 labs from the unit and give MiraLAX.  If no bowel movements will consult surgery.  IVF  Clear liquid diet  As needed pain control with IV morphine-try to minimize  Zoom home meds as ordered  Full code  Discussed plan of care with patient and her family at bedside.  Discussed with RN       7 pm- 7 am, please contact on call Hospitalist for any needs     Dispo-discharge home if she has a bowel movement.    Subjective:      no new events.  No new symptoms.  No BM since admission.  Has right sided abdominal pain.  No fever or urinary symptoms.     Physical Examination:      Vitals:  /80   Pulse 58   Temp 97.2 °F (36.2 °C) (Oral)   Resp 18   Ht 1.575 m (5' 2\")   Wt 59 kg (130 lb)   SpO2 96%   BMI 23.78 kg/m²   Temp (24hrs), Av.6 °F (36.4 °C), Min:97.2 °F (36.2 °C), Max:97.9 °F (36.6 °C)      General appearance: alert, cooperative and no distress  Mental Status: oriented to person, place and time and normal affect  Lungs: clear to auscultation bilaterally, normal effort  Heart: regular rate and rhythm, no murmur  Abdomen: soft, mild tenderness to palpation in the right side of her abdomen, nondistended, bowel sounds present, no masses  Extremities: no edema, redness, tenderness in the calves  Skin: no gross lesions, rashes    Data:     Labs:  Recent Labs     24  1600 24  0447   WBC 5.3 5.3   HGB 12.7 12.5    123*     Recent Labs     24  1612 24  1615 24  0447   NA 
Patient is A&Ox4 and stated she had zero pain. VSS, assessment completed and required med's given per MAR. No BM this morning. Patient denied any further needs and I continued to monitor. Call light is within reach and bed is in lowest position. Electronically signed by Kaleigh Keller RN on 5/20/2024 at 2:53 PM    
Patient seen and examined    Small bowel follow-through without any issues of ileus or obstruction.    Diet advance.    No surgical intervention anticipated or recommended    Please call if any questions.  I will sign off  
Pt c/o right lower quadrant pain, denies any N/V/D. Pt has been walking standby assist to bathroom and back, and tolerating that well. Around 2100 pt HR dropped down into 20's, 30's, 40's, 50's, on the bedside monitor. Pt denied any dizziness, or SOB. This nurse held Pt's Lopressor, and notified Dr. Wright about heart rate. Orders placed for tele monitor and an EKG, as well as to hold the Lopressor. EKG completed and monitor placed on PT.   
Shift assessment completed and medications given per MAR. Patient is able to make needs known and there are no additional needs at this time. Call light is within reach and bed is in lowest position. Family is at bedside visiting. Will continue to monitor.  
Shift assessment completed and patient is going down for small bowel follow through. Will continue to monitor.   
05/21/24  0412   WBC 5.3 4.4*   HGB 12.5 12.9   * 124*     Recent Labs     05/20/24  0447 05/21/24  0412    144   K 4.0 3.8    110*   CO2 25 26   BUN 14 8   CREATININE 0.51 0.51   GLUCOSE 102* 89     Recent Labs     05/20/24  0447 05/21/24  0412   AST 21 23   ALT 17 19   BILITOT 0.4 0.5   ALKPHOS 58 60       Current Facility-Administered Medications   Medication Dose Route Frequency Provider Last Rate Last Admin    losartan (COZAAR) tablet 50 mg  50 mg Oral BID Edgard Hein MD   50 mg at 05/21/24 1032    enoxaparin (LOVENOX) injection 60 mg  1 mg/kg SubCUTAneous BID Edgard Hein MD        docusate sodium (COLACE) capsule 100 mg  100 mg Oral Daily Marcel, Yazid R, DO        bisacodyl (DULCOLAX) EC tablet 5 mg  5 mg Oral Daily PRN Marcel, Yazid R, DO        melatonin disintegrating tablet 5 mg  5 mg Oral Nightly Jenny Wright DO   5 mg at 05/20/24 2219    sodium chloride flush 0.9 % injection 5-40 mL  5-40 mL IntraVENous 2 times per day Marcel, Yazid R, DO   10 mL at 05/20/24 1024    sodium chloride flush 0.9 % injection 5-40 mL  5-40 mL IntraVENous PRN Marcel, Yazid R, DO        0.9 % sodium chloride infusion   IntraVENous PRN Marcel, Yazid R, DO        potassium chloride (KLOR-CON M) extended release tablet 40 mEq  40 mEq Oral PRN Marcel, Yazid R, DO        Or    potassium bicarb-citric acid (EFFER-K) effervescent tablet 40 mEq  40 mEq Oral PRN Marcel, Yazid R, DO        Or    potassium chloride 10 mEq/100 mL IVPB (Peripheral Line)  10 mEq IntraVENous PRN Marcel, Yazid R, DO        magnesium sulfate 2000 mg in 50 mL IVPB premix  2,000 mg IntraVENous PRN Marcel, Yazid R, DO        ondansetron (ZOFRAN-ODT) disintegrating tablet 4 mg  4 mg Oral Q8H PRN Marcel, Yazid R, DO        Or    ondansetron (ZOFRAN) injection 4 mg  4 mg IntraVENous Q6H PRN Vinh Rod R, DO        polyethylene glycol (GLYCOLAX) packet 17 g  17 g Oral Daily PRN Vinh Rod R, DO        acetaminophen

## 2024-05-23 NOTE — DISCHARGE SUMMARY
prominence of the intra and extrahepatic biliary ducts, which can be seen in patients status post cholecystectomy.  Small bilateral renal cortical cysts are again identified.  A small accessory spleen is again noted. GI/Bowel: The stomach is grossly normal in appearance.  There are mildly distended loops of fluid-filled small bowel seen in the right lower quadrant. The presence of a nonspecific enteritis, including infective enteritis, cannot be excluded.  Colonic diverticulosis is again seen.  A moderate amount of retained stool is identified throughout much of the colon.  The appendix is not visualized.  Unless the patient had a known appendectomy, no further evaluation for the possibility of appendicitis can be made on this study. Clinical correlation is recommended. Pelvis: The urinary bladder is adequately distended and unremarkable.  The patient is again status post hysterectomy.  No adnexal abnormality is appreciated. Peritoneum/Retroperitoneum: No retroperitoneal or pelvic lymphadenopathy is identified.  No free fluid is seen in the abdomen and pelvis.  No abdominal or pelvic soft tissue mass is appreciated.  The abdominal aorta is again atherosclerotic. Bones/Soft Tissues: There is mild induration involving the ventral subcutaneous soft tissues in the midline of the abdomen.  Rule out postsurgical changes.  Osteo-degenerative changes are again noted involving the visualized thoracolumbar spine and pelvis again seen is a lumbar scoliosis, with convexity to the right..     1. Mildly distended loops of fluid-filled small bowel seen in the right lower quadrant. The presence of a nonspecific enteritis, including infective enteritis, cannot be excluded.  Clinical correlation is recommended. 2. Colonic diverticulosis again seen, when compared to the previous study performed 11/05/2022. 3. Again status post cholecystectomy and hysterectomy. 4. Mild induration involving the ventral subcutaneous soft tissues in the

## 2024-05-23 NOTE — CARE COORDINATION
MET WITH PATIENT IN ROOM TO DISCUSS D/C PLANNING. PLAN REMAINS HOME WITH HER  AND SON. SHE REPORTS FAMILY WILL TRANSPORT HER HOME.

## 2024-10-05 ENCOUNTER — APPOINTMENT (OUTPATIENT)
Dept: GENERAL RADIOLOGY | Age: 89
End: 2024-10-05
Payer: MEDICARE

## 2024-10-05 ENCOUNTER — APPOINTMENT (OUTPATIENT)
Dept: CT IMAGING | Age: 89
End: 2024-10-05
Payer: MEDICARE

## 2024-10-05 ENCOUNTER — HOSPITAL ENCOUNTER (EMERGENCY)
Age: 89
Discharge: HOME | End: 2024-10-05
Payer: MEDICARE

## 2024-10-05 VITALS
HEART RATE: 67 BPM | DIASTOLIC BLOOD PRESSURE: 71 MMHG | RESPIRATION RATE: 18 BRPM | WEIGHT: 128 LBS | TEMPERATURE: 98.6 F | OXYGEN SATURATION: 100 % | HEIGHT: 62 IN | SYSTOLIC BLOOD PRESSURE: 151 MMHG | BODY MASS INDEX: 23.55 KG/M2

## 2024-10-05 DIAGNOSIS — S83.92XA SPRAIN OF LEFT KNEE, UNSPECIFIED LIGAMENT, INITIAL ENCOUNTER: ICD-10-CM

## 2024-10-05 DIAGNOSIS — S63.501A SPRAIN OF RIGHT WRIST, INITIAL ENCOUNTER: Primary | ICD-10-CM

## 2024-10-05 DIAGNOSIS — S76.012A HIP STRAIN, LEFT, INITIAL ENCOUNTER: ICD-10-CM

## 2024-10-05 LAB
ALBUMIN SERPL-MCNC: 3.6 G/DL (ref 3.5–4.6)
ALP SERPL-CCNC: 73 U/L (ref 40–130)
ALT SERPL-CCNC: 30 U/L (ref 0–33)
ANION GAP SERPL CALCULATED.3IONS-SCNC: 6 MEQ/L (ref 9–15)
APTT PPP: 31.9 SEC (ref 24.4–36.8)
AST SERPL-CCNC: 25 U/L (ref 0–35)
BASOPHILS # BLD: 0 K/UL (ref 0–0.2)
BASOPHILS NFR BLD: 0.4 %
BILIRUB SERPL-MCNC: 0.4 MG/DL (ref 0.2–0.7)
BUN SERPL-MCNC: 26 MG/DL (ref 8–23)
CALCIUM SERPL-MCNC: 9.1 MG/DL (ref 8.5–9.9)
CHLORIDE SERPL-SCNC: 107 MEQ/L (ref 95–107)
CO2 SERPL-SCNC: 25 MEQ/L (ref 20–31)
CREAT SERPL-MCNC: 0.73 MG/DL (ref 0.5–0.9)
EOSINOPHIL # BLD: 0.1 K/UL (ref 0–0.7)
EOSINOPHIL NFR BLD: 1 %
ERYTHROCYTE [DISTWIDTH] IN BLOOD BY AUTOMATED COUNT: 14.1 % (ref 11.5–14.5)
ETHANOL PERCENT: NORMAL G/DL
ETHANOLAMINE SERPL-MCNC: <10 MG/DL (ref 0–0.08)
GLOBULIN SER CALC-MCNC: 2.1 G/DL (ref 2.3–3.5)
GLUCOSE SERPL-MCNC: 95 MG/DL (ref 70–99)
HCT VFR BLD AUTO: 40.5 % (ref 37–47)
HGB BLD-MCNC: 13.5 G/DL (ref 12–16)
INR PPP: 1.3
LYMPHOCYTES # BLD: 2.3 K/UL (ref 1–4.8)
LYMPHOCYTES NFR BLD: 31.7 %
MCH RBC QN AUTO: 33.2 PG (ref 27–31.3)
MCHC RBC AUTO-ENTMCNC: 33.3 % (ref 33–37)
MCV RBC AUTO: 99.5 FL (ref 79.4–94.8)
MONOCYTES # BLD: 0.6 K/UL (ref 0.2–0.8)
MONOCYTES NFR BLD: 8.9 %
NEUTROPHILS # BLD: 4.1 K/UL (ref 1.4–6.5)
NEUTS SEG NFR BLD: 57.7 %
PLATELET # BLD AUTO: 179 K/UL (ref 130–400)
POTASSIUM SERPL-SCNC: 4.4 MEQ/L (ref 3.4–4.9)
PROT SERPL-MCNC: 5.7 G/DL (ref 6.3–8)
PROTHROMBIN TIME: 16.4 SEC (ref 12.3–14.9)
RBC # BLD AUTO: 4.07 M/UL (ref 4.2–5.4)
SODIUM SERPL-SCNC: 138 MEQ/L (ref 135–144)
WBC # BLD AUTO: 7.2 K/UL (ref 4.8–10.8)

## 2024-10-05 PROCEDURE — 82077 ASSAY SPEC XCP UR&BREATH IA: CPT

## 2024-10-05 PROCEDURE — 85025 COMPLETE CBC W/AUTO DIFF WBC: CPT

## 2024-10-05 PROCEDURE — 93005 ELECTROCARDIOGRAM TRACING: CPT | Performed by: PHYSICIAN ASSISTANT

## 2024-10-05 PROCEDURE — 72192 CT PELVIS W/O DYE: CPT

## 2024-10-05 PROCEDURE — 85610 PROTHROMBIN TIME: CPT

## 2024-10-05 PROCEDURE — 73110 X-RAY EXAM OF WRIST: CPT

## 2024-10-05 PROCEDURE — 6370000000 HC RX 637 (ALT 250 FOR IP): Performed by: PHYSICIAN ASSISTANT

## 2024-10-05 PROCEDURE — 73502 X-RAY EXAM HIP UNI 2-3 VIEWS: CPT

## 2024-10-05 PROCEDURE — 99285 EMERGENCY DEPT VISIT HI MDM: CPT

## 2024-10-05 PROCEDURE — 73560 X-RAY EXAM OF KNEE 1 OR 2: CPT

## 2024-10-05 PROCEDURE — 85730 THROMBOPLASTIN TIME PARTIAL: CPT

## 2024-10-05 PROCEDURE — 80053 COMPREHEN METABOLIC PANEL: CPT

## 2024-10-05 RX ORDER — ACETAMINOPHEN 500 MG
1000 TABLET ORAL ONCE
Status: COMPLETED | OUTPATIENT
Start: 2024-10-05 | End: 2024-10-05

## 2024-10-05 RX ORDER — 0.9 % SODIUM CHLORIDE 0.9 %
500 INTRAVENOUS SOLUTION INTRAVENOUS ONCE
Status: DISCONTINUED | OUTPATIENT
Start: 2024-10-05 | End: 2024-10-05

## 2024-10-05 RX ADMIN — ACETAMINOPHEN 1000 MG: 500 TABLET ORAL at 17:06

## 2024-10-05 ASSESSMENT — PAIN SCALES - GENERAL
PAINLEVEL_OUTOF10: 7
PAINLEVEL_OUTOF10: 4

## 2024-10-05 ASSESSMENT — LIFESTYLE VARIABLES
HOW OFTEN DO YOU HAVE A DRINK CONTAINING ALCOHOL: NEVER
HOW MANY STANDARD DRINKS CONTAINING ALCOHOL DO YOU HAVE ON A TYPICAL DAY: PATIENT DOES NOT DRINK
HOW MANY STANDARD DRINKS CONTAINING ALCOHOL DO YOU HAVE ON A TYPICAL DAY: PATIENT DOES NOT DRINK
HOW OFTEN DO YOU HAVE A DRINK CONTAINING ALCOHOL: NEVER

## 2024-10-05 ASSESSMENT — ENCOUNTER SYMPTOMS
ABDOMINAL PAIN: 0
BACK PAIN: 0

## 2024-10-05 ASSESSMENT — PAIN - FUNCTIONAL ASSESSMENT: PAIN_FUNCTIONAL_ASSESSMENT: 0-10

## 2024-10-05 NOTE — ED TRIAGE NOTES
Pt came in via ems from home with a  c/o fall. Pt states she tripped over her husbands oxygen tubing. Pt denies hitting head or LOC. Pt is on blood thinner for known a-fib

## 2024-10-07 LAB
EKG ATRIAL RATE: 39 BPM
EKG Q-T INTERVAL: 450 MS
EKG QRS DURATION: 74 MS
EKG QTC CALCULATION (BAZETT): 464 MS
EKG R AXIS: 29 DEGREES
EKG T AXIS: 189 DEGREES
EKG VENTRICULAR RATE: 64 BPM

## 2024-10-07 PROCEDURE — 93010 ELECTROCARDIOGRAM REPORT: CPT | Performed by: INTERNAL MEDICINE

## 2025-07-09 ENCOUNTER — HOSPITAL ENCOUNTER (INPATIENT)
Facility: HOSPITAL | Age: 89
LOS: 2 days | Discharge: NURSING, REHAB OR LONG-TERM CARE | End: 2025-07-11
Attending: EMERGENCY MEDICINE | Admitting: INTERNAL MEDICINE
Payer: MEDICARE

## 2025-07-09 ENCOUNTER — APPOINTMENT (OUTPATIENT)
Dept: RADIOLOGY | Facility: HOSPITAL | Age: 89
End: 2025-07-09
Payer: MEDICARE

## 2025-07-09 DIAGNOSIS — S22.41XA CLOSED FRACTURE OF MULTIPLE RIBS OF RIGHT SIDE, INITIAL ENCOUNTER: ICD-10-CM

## 2025-07-09 DIAGNOSIS — R53.1 WEAKNESS: Primary | ICD-10-CM

## 2025-07-09 PROBLEM — I12.9 CHRONIC KIDNEY DISEASE DUE TO HYPERTENSION: Status: ACTIVE | Noted: 2022-11-30

## 2025-07-09 PROBLEM — N18.31 STAGE 3A CHRONIC KIDNEY DISEASE (MULTI): Status: ACTIVE | Noted: 2022-11-30

## 2025-07-09 PROBLEM — Z93.1 PRESENCE OF GASTROSTOMY (MULTI): Status: ACTIVE | Noted: 2022-11-11

## 2025-07-09 PROBLEM — R13.10 DYSPHAGIA: Status: ACTIVE | Noted: 2018-01-23

## 2025-07-09 PROBLEM — M54.41 CHRONIC LOW BACK PAIN WITH BILATERAL SCIATICA: Status: ACTIVE | Noted: 2017-11-20

## 2025-07-09 PROBLEM — C67.9: Status: ACTIVE | Noted: 2022-11-11

## 2025-07-09 PROBLEM — I42.2 APICAL VARIANT HYPERTROPHIC CARDIOMYOPATHY (MULTI): Status: ACTIVE | Noted: 2020-06-12

## 2025-07-09 PROBLEM — G89.29 CHRONIC LOW BACK PAIN WITH BILATERAL SCIATICA: Status: ACTIVE | Noted: 2017-11-20

## 2025-07-09 PROBLEM — R26.2 DIFFICULTY WALKING: Status: ACTIVE | Noted: 2019-10-10

## 2025-07-09 PROBLEM — K21.9 GASTROESOPHAGEAL REFLUX DISEASE WITHOUT ESOPHAGITIS: Status: ACTIVE | Noted: 2018-01-23

## 2025-07-09 PROBLEM — M54.42 CHRONIC LOW BACK PAIN WITH BILATERAL SCIATICA: Status: ACTIVE | Noted: 2017-11-20

## 2025-07-09 PROBLEM — I48.19 PERSISTENT ATRIAL FIBRILLATION (MULTI): Status: ACTIVE | Noted: 2022-11-29

## 2025-07-09 LAB
ALBUMIN SERPL BCP-MCNC: 3.4 G/DL (ref 3.4–5)
ALP SERPL-CCNC: 58 U/L (ref 33–136)
ALT SERPL W P-5'-P-CCNC: 29 U/L (ref 7–45)
ANION GAP SERPL CALC-SCNC: 10 MMOL/L (ref 10–20)
AST SERPL W P-5'-P-CCNC: 20 U/L (ref 9–39)
BASOPHILS # BLD AUTO: 0.05 X10*3/UL (ref 0–0.1)
BASOPHILS NFR BLD AUTO: 0.6 %
BILIRUB SERPL-MCNC: 0.4 MG/DL (ref 0–1.2)
BUN SERPL-MCNC: 22 MG/DL (ref 6–23)
CALCIUM SERPL-MCNC: 9 MG/DL (ref 8.6–10.3)
CHLORIDE SERPL-SCNC: 102 MMOL/L (ref 98–107)
CO2 SERPL-SCNC: 28 MMOL/L (ref 21–32)
CREAT SERPL-MCNC: 0.97 MG/DL (ref 0.5–1.05)
EGFRCR SERPLBLD CKD-EPI 2021: 56 ML/MIN/1.73M*2
EOSINOPHIL # BLD AUTO: 0.07 X10*3/UL (ref 0–0.4)
EOSINOPHIL NFR BLD AUTO: 0.9 %
ERYTHROCYTE [DISTWIDTH] IN BLOOD BY AUTOMATED COUNT: 14.2 % (ref 11.5–14.5)
GLUCOSE SERPL-MCNC: 113 MG/DL (ref 74–99)
HCT VFR BLD AUTO: 38.4 % (ref 36–46)
HGB BLD-MCNC: 12.5 G/DL (ref 12–16)
IMM GRANULOCYTES # BLD AUTO: 0.04 X10*3/UL (ref 0–0.5)
IMM GRANULOCYTES NFR BLD AUTO: 0.5 % (ref 0–0.9)
LYMPHOCYTES # BLD AUTO: 2.25 X10*3/UL (ref 0.8–3)
LYMPHOCYTES NFR BLD AUTO: 28.2 %
MCH RBC QN AUTO: 32 PG (ref 26–34)
MCHC RBC AUTO-ENTMCNC: 32.6 G/DL (ref 32–36)
MCV RBC AUTO: 98 FL (ref 80–100)
MONOCYTES # BLD AUTO: 0.71 X10*3/UL (ref 0.05–0.8)
MONOCYTES NFR BLD AUTO: 8.9 %
NEUTROPHILS # BLD AUTO: 4.86 X10*3/UL (ref 1.6–5.5)
NEUTROPHILS NFR BLD AUTO: 60.9 %
NRBC BLD-RTO: 0 /100 WBCS (ref 0–0)
PLATELET # BLD AUTO: 191 X10*3/UL (ref 150–450)
POTASSIUM SERPL-SCNC: 4.1 MMOL/L (ref 3.5–5.3)
PROT SERPL-MCNC: 5.5 G/DL (ref 6.4–8.2)
RBC # BLD AUTO: 3.91 X10*6/UL (ref 4–5.2)
SODIUM SERPL-SCNC: 136 MMOL/L (ref 136–145)
WBC # BLD AUTO: 8 X10*3/UL (ref 4.4–11.3)

## 2025-07-09 PROCEDURE — 70450 CT HEAD/BRAIN W/O DYE: CPT | Performed by: RADIOLOGY

## 2025-07-09 PROCEDURE — 72125 CT NECK SPINE W/O DYE: CPT | Performed by: RADIOLOGY

## 2025-07-09 PROCEDURE — 71260 CT THORAX DX C+: CPT | Performed by: RADIOLOGY

## 2025-07-09 PROCEDURE — 70450 CT HEAD/BRAIN W/O DYE: CPT

## 2025-07-09 PROCEDURE — 72131 CT LUMBAR SPINE W/O DYE: CPT | Performed by: RADIOLOGY

## 2025-07-09 PROCEDURE — 74177 CT ABD & PELVIS W/CONTRAST: CPT

## 2025-07-09 PROCEDURE — 72128 CT CHEST SPINE W/O DYE: CPT | Performed by: RADIOLOGY

## 2025-07-09 PROCEDURE — 99223 1ST HOSP IP/OBS HIGH 75: CPT | Performed by: NURSE PRACTITIONER

## 2025-07-09 PROCEDURE — 99285 EMERGENCY DEPT VISIT HI MDM: CPT | Mod: 25 | Performed by: EMERGENCY MEDICINE

## 2025-07-09 PROCEDURE — 72125 CT NECK SPINE W/O DYE: CPT

## 2025-07-09 PROCEDURE — 85025 COMPLETE CBC W/AUTO DIFF WBC: CPT | Performed by: EMERGENCY MEDICINE

## 2025-07-09 PROCEDURE — 2550000001 HC RX 255 CONTRASTS: Performed by: EMERGENCY MEDICINE

## 2025-07-09 PROCEDURE — 74177 CT ABD & PELVIS W/CONTRAST: CPT | Performed by: RADIOLOGY

## 2025-07-09 PROCEDURE — 1210000001 HC SEMI-PRIVATE ROOM DAILY

## 2025-07-09 PROCEDURE — 80053 COMPREHEN METABOLIC PANEL: CPT | Performed by: EMERGENCY MEDICINE

## 2025-07-09 RX ORDER — LOSARTAN POTASSIUM 50 MG/1
50 TABLET ORAL 2 TIMES DAILY
COMMUNITY
End: 2025-07-10 | Stop reason: ALTCHOICE

## 2025-07-09 RX ORDER — LOSARTAN POTASSIUM 25 MG/1
25 TABLET ORAL
COMMUNITY
Start: 2025-05-01

## 2025-07-09 RX ORDER — HYOSCYAMINE SULFATE 0.12 MG/1
0.12 TABLET SUBLINGUAL
COMMUNITY
Start: 2025-04-10

## 2025-07-09 RX ADMIN — IOHEXOL 100 ML: 350 INJECTION, SOLUTION INTRAVENOUS at 19:20

## 2025-07-09 ASSESSMENT — LIFESTYLE VARIABLES
HAVE PEOPLE ANNOYED YOU BY CRITICIZING YOUR DRINKING: NO
TOTAL SCORE: 0
EVER HAD A DRINK FIRST THING IN THE MORNING TO STEADY YOUR NERVES TO GET RID OF A HANGOVER: NO
EVER FELT BAD OR GUILTY ABOUT YOUR DRINKING: NO
HAVE YOU EVER FELT YOU SHOULD CUT DOWN ON YOUR DRINKING: NO

## 2025-07-09 ASSESSMENT — PAIN - FUNCTIONAL ASSESSMENT: PAIN_FUNCTIONAL_ASSESSMENT: 0-10

## 2025-07-09 ASSESSMENT — PAIN DESCRIPTION - LOCATION: LOCATION: BACK

## 2025-07-09 ASSESSMENT — PAIN DESCRIPTION - ONSET: ONSET: ONGOING

## 2025-07-09 ASSESSMENT — PAIN SCALES - GENERAL
PAINLEVEL_OUTOF10: 8
PAINLEVEL_OUTOF10: 10 - WORST POSSIBLE PAIN

## 2025-07-09 ASSESSMENT — PAIN DESCRIPTION - ORIENTATION: ORIENTATION: LOWER

## 2025-07-09 ASSESSMENT — PAIN DESCRIPTION - FREQUENCY: FREQUENCY: CONSTANT/CONTINUOUS

## 2025-07-09 ASSESSMENT — PAIN DESCRIPTION - PAIN TYPE: TYPE: ACUTE PAIN;CHRONIC PAIN

## 2025-07-09 ASSESSMENT — PAIN DESCRIPTION - DESCRIPTORS: DESCRIPTORS: STABBING;JABBING;SHARP

## 2025-07-10 ENCOUNTER — APPOINTMENT (OUTPATIENT)
Dept: RADIOLOGY | Facility: HOSPITAL | Age: 89
End: 2025-07-10
Payer: MEDICARE

## 2025-07-10 LAB
ALBUMIN SERPL BCP-MCNC: 3.5 G/DL (ref 3.4–5)
ALP SERPL-CCNC: 58 U/L (ref 33–136)
ALT SERPL W P-5'-P-CCNC: 27 U/L (ref 7–45)
ANION GAP SERPL CALC-SCNC: 10 MMOL/L (ref 10–20)
APPEARANCE UR: ABNORMAL
AST SERPL W P-5'-P-CCNC: 19 U/L (ref 9–39)
BASOPHILS # BLD AUTO: 0.04 X10*3/UL (ref 0–0.1)
BASOPHILS NFR BLD AUTO: 0.4 %
BILIRUB SERPL-MCNC: 0.6 MG/DL (ref 0–1.2)
BILIRUB UR STRIP.AUTO-MCNC: NEGATIVE MG/DL
BUN SERPL-MCNC: 17 MG/DL (ref 6–23)
CALCIUM SERPL-MCNC: 9 MG/DL (ref 8.6–10.3)
CHLORIDE SERPL-SCNC: 103 MMOL/L (ref 98–107)
CO2 SERPL-SCNC: 27 MMOL/L (ref 21–32)
COLOR UR: ABNORMAL
CREAT SERPL-MCNC: 0.82 MG/DL (ref 0.5–1.05)
EGFRCR SERPLBLD CKD-EPI 2021: 68 ML/MIN/1.73M*2
EOSINOPHIL # BLD AUTO: 0.08 X10*3/UL (ref 0–0.4)
EOSINOPHIL NFR BLD AUTO: 0.9 %
ERYTHROCYTE [DISTWIDTH] IN BLOOD BY AUTOMATED COUNT: 14.2 % (ref 11.5–14.5)
GLUCOSE SERPL-MCNC: 125 MG/DL (ref 74–99)
GLUCOSE UR STRIP.AUTO-MCNC: NORMAL MG/DL
HCT VFR BLD AUTO: 39.5 % (ref 36–46)
HGB BLD-MCNC: 13.3 G/DL (ref 12–16)
IMM GRANULOCYTES # BLD AUTO: 0.04 X10*3/UL (ref 0–0.5)
IMM GRANULOCYTES NFR BLD AUTO: 0.4 % (ref 0–0.9)
KETONES UR STRIP.AUTO-MCNC: NEGATIVE MG/DL
LEUKOCYTE ESTERASE UR QL STRIP.AUTO: NEGATIVE
LYMPHOCYTES # BLD AUTO: 2.29 X10*3/UL (ref 0.8–3)
LYMPHOCYTES NFR BLD AUTO: 25.2 %
MAGNESIUM SERPL-MCNC: 2.01 MG/DL (ref 1.6–2.4)
MCH RBC QN AUTO: 32.2 PG (ref 26–34)
MCHC RBC AUTO-ENTMCNC: 33.7 G/DL (ref 32–36)
MCV RBC AUTO: 96 FL (ref 80–100)
MONOCYTES # BLD AUTO: 0.78 X10*3/UL (ref 0.05–0.8)
MONOCYTES NFR BLD AUTO: 8.6 %
NEUTROPHILS # BLD AUTO: 5.84 X10*3/UL (ref 1.6–5.5)
NEUTROPHILS NFR BLD AUTO: 64.5 %
NITRITE UR QL STRIP.AUTO: NEGATIVE
NRBC BLD-RTO: 0 /100 WBCS (ref 0–0)
PH UR STRIP.AUTO: 7.5 [PH]
PLATELET # BLD AUTO: 188 X10*3/UL (ref 150–450)
POTASSIUM SERPL-SCNC: 4 MMOL/L (ref 3.5–5.3)
PROT SERPL-MCNC: 5.8 G/DL (ref 6.4–8.2)
PROT UR STRIP.AUTO-MCNC: NEGATIVE MG/DL
RBC # BLD AUTO: 4.13 X10*6/UL (ref 4–5.2)
RBC # UR STRIP.AUTO: NEGATIVE MG/DL
SODIUM SERPL-SCNC: 136 MMOL/L (ref 136–145)
SP GR UR STRIP.AUTO: 1.03
UROBILINOGEN UR STRIP.AUTO-MCNC: NORMAL MG/DL
WBC # BLD AUTO: 9.1 X10*3/UL (ref 4.4–11.3)

## 2025-07-10 PROCEDURE — 2500000002 HC RX 250 W HCPCS SELF ADMINISTERED DRUGS (ALT 637 FOR MEDICARE OP, ALT 636 FOR OP/ED): Performed by: NURSE PRACTITIONER

## 2025-07-10 PROCEDURE — 2500000001 HC RX 250 WO HCPCS SELF ADMINISTERED DRUGS (ALT 637 FOR MEDICARE OP): Performed by: STUDENT IN AN ORGANIZED HEALTH CARE EDUCATION/TRAINING PROGRAM

## 2025-07-10 PROCEDURE — 99232 SBSQ HOSP IP/OBS MODERATE 35: CPT | Performed by: SURGERY

## 2025-07-10 PROCEDURE — 36415 COLL VENOUS BLD VENIPUNCTURE: CPT | Performed by: NURSE PRACTITIONER

## 2025-07-10 PROCEDURE — 97161 PT EVAL LOW COMPLEX 20 MIN: CPT | Mod: GP | Performed by: PHYSICAL THERAPIST

## 2025-07-10 PROCEDURE — 2500000004 HC RX 250 GENERAL PHARMACY W/ HCPCS (ALT 636 FOR OP/ED): Performed by: NURSE PRACTITIONER

## 2025-07-10 PROCEDURE — 1200000002 HC GENERAL ROOM WITH TELEMETRY DAILY

## 2025-07-10 PROCEDURE — 99232 SBSQ HOSP IP/OBS MODERATE 35: CPT | Performed by: STUDENT IN AN ORGANIZED HEALTH CARE EDUCATION/TRAINING PROGRAM

## 2025-07-10 PROCEDURE — 2500000005 HC RX 250 GENERAL PHARMACY W/O HCPCS: Performed by: NURSE PRACTITIONER

## 2025-07-10 PROCEDURE — 2500000004 HC RX 250 GENERAL PHARMACY W/ HCPCS (ALT 636 FOR OP/ED): Mod: JW | Performed by: REGISTERED NURSE

## 2025-07-10 PROCEDURE — 2500000001 HC RX 250 WO HCPCS SELF ADMINISTERED DRUGS (ALT 637 FOR MEDICARE OP): Performed by: NURSE PRACTITIONER

## 2025-07-10 PROCEDURE — 71045 X-RAY EXAM CHEST 1 VIEW: CPT

## 2025-07-10 PROCEDURE — 71045 X-RAY EXAM CHEST 1 VIEW: CPT | Performed by: RADIOLOGY

## 2025-07-10 PROCEDURE — 2500000004 HC RX 250 GENERAL PHARMACY W/ HCPCS (ALT 636 FOR OP/ED): Performed by: STUDENT IN AN ORGANIZED HEALTH CARE EDUCATION/TRAINING PROGRAM

## 2025-07-10 PROCEDURE — 84075 ASSAY ALKALINE PHOSPHATASE: CPT | Performed by: NURSE PRACTITIONER

## 2025-07-10 PROCEDURE — 81003 URINALYSIS AUTO W/O SCOPE: CPT | Performed by: EMERGENCY MEDICINE

## 2025-07-10 PROCEDURE — 85025 COMPLETE CBC W/AUTO DIFF WBC: CPT | Performed by: NURSE PRACTITIONER

## 2025-07-10 PROCEDURE — 83735 ASSAY OF MAGNESIUM: CPT | Performed by: NURSE PRACTITIONER

## 2025-07-10 PROCEDURE — 97165 OT EVAL LOW COMPLEX 30 MIN: CPT | Mod: GO

## 2025-07-10 RX ORDER — PREGABALIN 25 MG/1
25 CAPSULE ORAL 3 TIMES DAILY
COMMUNITY

## 2025-07-10 RX ORDER — PREGABALIN 25 MG/1
25 CAPSULE ORAL 3 TIMES DAILY
Status: DISCONTINUED | OUTPATIENT
Start: 2025-07-10 | End: 2025-07-10

## 2025-07-10 RX ORDER — KETOROLAC TROMETHAMINE 30 MG/ML
15 INJECTION, SOLUTION INTRAMUSCULAR; INTRAVENOUS EVERY 6 HOURS SCHEDULED
Status: DISCONTINUED | OUTPATIENT
Start: 2025-07-10 | End: 2025-07-11 | Stop reason: HOSPADM

## 2025-07-10 RX ORDER — SULFAMETHOXAZOLE AND TRIMETHOPRIM 800; 160 MG/1; MG/1
1 TABLET ORAL 2 TIMES DAILY
Status: DISCONTINUED | OUTPATIENT
Start: 2025-07-10 | End: 2025-07-11 | Stop reason: HOSPADM

## 2025-07-10 RX ORDER — CYCLOBENZAPRINE HCL 5 MG
7.5 TABLET ORAL 3 TIMES DAILY
Status: DISCONTINUED | OUTPATIENT
Start: 2025-07-10 | End: 2025-07-11 | Stop reason: HOSPADM

## 2025-07-10 RX ORDER — CYCLOBENZAPRINE HCL 10 MG
5 TABLET ORAL 3 TIMES DAILY
Status: DISCONTINUED | OUTPATIENT
Start: 2025-07-10 | End: 2025-07-10

## 2025-07-10 RX ORDER — PANTOPRAZOLE SODIUM 40 MG/10ML
40 INJECTION, POWDER, LYOPHILIZED, FOR SOLUTION INTRAVENOUS DAILY
Status: DISCONTINUED | OUTPATIENT
Start: 2025-07-10 | End: 2025-07-10

## 2025-07-10 RX ORDER — PREGABALIN 25 MG/1
25 CAPSULE ORAL 3 TIMES DAILY
Status: DISCONTINUED | OUTPATIENT
Start: 2025-07-10 | End: 2025-07-11 | Stop reason: HOSPADM

## 2025-07-10 RX ORDER — PANTOPRAZOLE SODIUM 40 MG/1
40 TABLET, DELAYED RELEASE ORAL DAILY
Status: DISCONTINUED | OUTPATIENT
Start: 2025-07-10 | End: 2025-07-11 | Stop reason: HOSPADM

## 2025-07-10 RX ORDER — TRAMADOL HYDROCHLORIDE 50 MG/1
50 TABLET, FILM COATED ORAL EVERY 6 HOURS PRN
Status: DISCONTINUED | OUTPATIENT
Start: 2025-07-10 | End: 2025-07-11 | Stop reason: HOSPADM

## 2025-07-10 RX ORDER — LOSARTAN POTASSIUM 25 MG/1
25 TABLET ORAL DAILY
Status: DISCONTINUED | OUTPATIENT
Start: 2025-07-10 | End: 2025-07-11 | Stop reason: HOSPADM

## 2025-07-10 RX ORDER — METOPROLOL TARTRATE 25 MG/1
25 TABLET, FILM COATED ORAL 2 TIMES DAILY
Status: DISCONTINUED | OUTPATIENT
Start: 2025-07-10 | End: 2025-07-11

## 2025-07-10 RX ORDER — LIDOCAINE 560 MG/1
1 PATCH PERCUTANEOUS; TOPICAL; TRANSDERMAL DAILY
Status: DISCONTINUED | OUTPATIENT
Start: 2025-07-10 | End: 2025-07-11 | Stop reason: HOSPADM

## 2025-07-10 RX ORDER — OXYBUTYNIN CHLORIDE 5 MG/1
5 TABLET ORAL 2 TIMES DAILY
Status: DISCONTINUED | OUTPATIENT
Start: 2025-07-10 | End: 2025-07-11 | Stop reason: HOSPADM

## 2025-07-10 RX ORDER — TRAMADOL HYDROCHLORIDE 50 MG/1
50 TABLET, FILM COATED ORAL EVERY 8 HOURS PRN
Status: DISCONTINUED | OUTPATIENT
Start: 2025-07-10 | End: 2025-07-10

## 2025-07-10 RX ORDER — DOCUSATE SODIUM 100 MG/1
100 CAPSULE, LIQUID FILLED ORAL 2 TIMES DAILY
Status: DISCONTINUED | OUTPATIENT
Start: 2025-07-10 | End: 2025-07-11 | Stop reason: HOSPADM

## 2025-07-10 RX ORDER — CYCLOBENZAPRINE HCL 10 MG
5 TABLET ORAL 3 TIMES DAILY PRN
Status: DISCONTINUED | OUTPATIENT
Start: 2025-07-10 | End: 2025-07-10

## 2025-07-10 RX ORDER — PRAVASTATIN SODIUM 40 MG/1
40 TABLET ORAL NIGHTLY
COMMUNITY

## 2025-07-10 RX ORDER — METOPROLOL TARTRATE 25 MG/1
25 TABLET, FILM COATED ORAL 2 TIMES DAILY
COMMUNITY
End: 2025-07-11 | Stop reason: HOSPADM

## 2025-07-10 RX ORDER — SULFAMETHOXAZOLE AND TRIMETHOPRIM 800; 160 MG/1; MG/1
1 TABLET ORAL 2 TIMES DAILY
COMMUNITY
Start: 2025-07-07 | End: 2025-07-14

## 2025-07-10 RX ORDER — METOPROLOL TARTRATE 1 MG/ML
5 INJECTION, SOLUTION INTRAVENOUS EVERY 6 HOURS PRN
Status: DISCONTINUED | OUTPATIENT
Start: 2025-07-10 | End: 2025-07-11 | Stop reason: HOSPADM

## 2025-07-10 RX ORDER — CALCIUM CARBONATE 200(500)MG
500 TABLET,CHEWABLE ORAL 4 TIMES DAILY PRN
Status: DISCONTINUED | OUTPATIENT
Start: 2025-07-10 | End: 2025-07-11 | Stop reason: HOSPADM

## 2025-07-10 RX ORDER — MUPIROCIN 20 MG/G
1 OINTMENT TOPICAL
COMMUNITY
Start: 2025-07-08 | End: 2025-07-13

## 2025-07-10 RX ORDER — MORPHINE SULFATE 2 MG/ML
2 INJECTION, SOLUTION INTRAMUSCULAR; INTRAVENOUS ONCE
Status: COMPLETED | OUTPATIENT
Start: 2025-07-10 | End: 2025-07-10

## 2025-07-10 RX ORDER — PRAVASTATIN SODIUM 20 MG/1
40 TABLET ORAL NIGHTLY
Status: DISCONTINUED | OUTPATIENT
Start: 2025-07-10 | End: 2025-07-11 | Stop reason: HOSPADM

## 2025-07-10 RX ORDER — OXYBUTYNIN CHLORIDE 5 MG/1
5 TABLET ORAL 2 TIMES DAILY
COMMUNITY

## 2025-07-10 RX ORDER — PANTOPRAZOLE SODIUM 40 MG/1
40 TABLET, DELAYED RELEASE ORAL
COMMUNITY

## 2025-07-10 RX ORDER — ACETAMINOPHEN 325 MG/1
650 TABLET ORAL EVERY 6 HOURS PRN
Status: DISCONTINUED | OUTPATIENT
Start: 2025-07-10 | End: 2025-07-11 | Stop reason: HOSPADM

## 2025-07-10 RX ADMIN — KETOROLAC TROMETHAMINE 15 MG: 30 INJECTION, SOLUTION INTRAMUSCULAR at 17:37

## 2025-07-10 RX ADMIN — CYCLOBENZAPRINE HYDROCHLORIDE 5 MG: 10 TABLET, FILM COATED ORAL at 10:08

## 2025-07-10 RX ADMIN — PREGABALIN 25 MG: 25 CAPSULE ORAL at 16:36

## 2025-07-10 RX ADMIN — TRAMADOL HYDROCHLORIDE 50 MG: 50 TABLET, FILM COATED ORAL at 07:47

## 2025-07-10 RX ADMIN — SODIUM CHLORIDE 500 ML: 9 INJECTION, SOLUTION INTRAVENOUS at 18:58

## 2025-07-10 RX ADMIN — METOPROLOL TARTRATE 25 MG: 25 TABLET, FILM COATED ORAL at 20:36

## 2025-07-10 RX ADMIN — APIXABAN 2.5 MG: 5 TABLET, FILM COATED ORAL at 08:28

## 2025-07-10 RX ADMIN — SULFAMETHOXAZOLE AND TRIMETHOPRIM 1 TABLET: 800; 160 TABLET ORAL at 08:28

## 2025-07-10 RX ADMIN — PREGABALIN 25 MG: 25 CAPSULE ORAL at 20:44

## 2025-07-10 RX ADMIN — PRAVASTATIN SODIUM 40 MG: 20 TABLET ORAL at 20:42

## 2025-07-10 RX ADMIN — KETOROLAC TROMETHAMINE 15 MG: 30 INJECTION, SOLUTION INTRAMUSCULAR at 11:30

## 2025-07-10 RX ADMIN — CYCLOBENZAPRINE 7.5 MG: 5 TABLET, FILM COATED ORAL at 16:36

## 2025-07-10 RX ADMIN — ANTACID TABLETS 1 TABLET: 500 TABLET, CHEWABLE ORAL at 08:28

## 2025-07-10 RX ADMIN — PANTOPRAZOLE SODIUM 40 MG: 40 INJECTION, POWDER, FOR SOLUTION INTRAVENOUS at 05:40

## 2025-07-10 RX ADMIN — LIDOCAINE 1 PATCH: 4 PATCH TOPICAL at 08:29

## 2025-07-10 RX ADMIN — DOCUSATE SODIUM 100 MG: 100 CAPSULE, LIQUID FILLED ORAL at 20:46

## 2025-07-10 RX ADMIN — LOSARTAN POTASSIUM 25 MG: 25 TABLET, FILM COATED ORAL at 08:29

## 2025-07-10 RX ADMIN — METOPROLOL TARTRATE 25 MG: 25 TABLET, FILM COATED ORAL at 08:28

## 2025-07-10 RX ADMIN — SULFAMETHOXAZOLE AND TRIMETHOPRIM 1 TABLET: 800; 160 TABLET ORAL at 20:44

## 2025-07-10 RX ADMIN — OXYBUTYNIN CHLORIDE 5 MG: 5 TABLET ORAL at 20:37

## 2025-07-10 RX ADMIN — DOCUSATE SODIUM 100 MG: 100 CAPSULE, LIQUID FILLED ORAL at 08:29

## 2025-07-10 RX ADMIN — CYCLOBENZAPRINE 7.5 MG: 5 TABLET, FILM COATED ORAL at 20:37

## 2025-07-10 RX ADMIN — MORPHINE SULFATE 2 MG: 2 INJECTION, SOLUTION INTRAMUSCULAR; INTRAVENOUS at 06:20

## 2025-07-10 RX ADMIN — APIXABAN 2.5 MG: 5 TABLET, FILM COATED ORAL at 20:41

## 2025-07-10 RX ADMIN — PREGABALIN 25 MG: 25 CAPSULE ORAL at 07:39

## 2025-07-10 RX ADMIN — TRAMADOL HYDROCHLORIDE 50 MG: 50 TABLET, FILM COATED ORAL at 00:55

## 2025-07-10 RX ADMIN — OXYBUTYNIN CHLORIDE 5 MG: 5 TABLET ORAL at 08:28

## 2025-07-10 SDOH — ECONOMIC STABILITY: FOOD INSECURITY: HOW HARD IS IT FOR YOU TO PAY FOR THE VERY BASICS LIKE FOOD, HOUSING, MEDICAL CARE, AND HEATING?: NOT HARD AT ALL

## 2025-07-10 SDOH — SOCIAL STABILITY: SOCIAL NETWORK
DO YOU BELONG TO ANY CLUBS OR ORGANIZATIONS SUCH AS CHURCH GROUPS, UNIONS, FRATERNAL OR ATHLETIC GROUPS, OR SCHOOL GROUPS?: NO

## 2025-07-10 SDOH — SOCIAL STABILITY: SOCIAL INSECURITY: ARE THERE ANY APPARENT SIGNS OF INJURIES/BEHAVIORS THAT COULD BE RELATED TO ABUSE/NEGLECT?: NO

## 2025-07-10 SDOH — SOCIAL STABILITY: SOCIAL INSECURITY
WITHIN THE LAST YEAR, HAVE YOU BEEN KICKED, HIT, SLAPPED, OR OTHERWISE PHYSICALLY HURT BY YOUR PARTNER OR EX-PARTNER?: NO

## 2025-07-10 SDOH — SOCIAL STABILITY: SOCIAL INSECURITY: ARE YOU MARRIED, WIDOWED, DIVORCED, SEPARATED, NEVER MARRIED, OR LIVING WITH A PARTNER?: MARRIED

## 2025-07-10 SDOH — ECONOMIC STABILITY: HOUSING INSECURITY: IN THE PAST 12 MONTHS, HOW MANY TIMES HAVE YOU MOVED WHERE YOU WERE LIVING?: 0

## 2025-07-10 SDOH — SOCIAL STABILITY: SOCIAL INSECURITY: DO YOU FEEL ANYONE HAS EXPLOITED OR TAKEN ADVANTAGE OF YOU FINANCIALLY OR OF YOUR PERSONAL PROPERTY?: NO

## 2025-07-10 SDOH — ECONOMIC STABILITY: HOUSING INSECURITY: IN THE LAST 12 MONTHS, WAS THERE A TIME WHEN YOU WERE NOT ABLE TO PAY THE MORTGAGE OR RENT ON TIME?: NO

## 2025-07-10 SDOH — SOCIAL STABILITY: SOCIAL INSECURITY: HAS ANYONE EVER THREATENED TO HURT YOUR FAMILY OR YOUR PETS?: NO

## 2025-07-10 SDOH — HEALTH STABILITY: PHYSICAL HEALTH: ON AVERAGE, HOW MANY MINUTES DO YOU ENGAGE IN EXERCISE AT THIS LEVEL?: 30 MIN

## 2025-07-10 SDOH — ECONOMIC STABILITY: FOOD INSECURITY: WITHIN THE PAST 12 MONTHS, THE FOOD YOU BOUGHT JUST DIDN'T LAST AND YOU DIDN'T HAVE MONEY TO GET MORE.: NEVER TRUE

## 2025-07-10 SDOH — SOCIAL STABILITY: SOCIAL NETWORK: HOW OFTEN DO YOU ATTEND MEETINGS OF THE CLUBS OR ORGANIZATIONS YOU BELONG TO?: NEVER

## 2025-07-10 SDOH — SOCIAL STABILITY: SOCIAL NETWORK: HOW OFTEN DO YOU GET TOGETHER WITH FRIENDS OR RELATIVES?: MORE THAN THREE TIMES A WEEK

## 2025-07-10 SDOH — SOCIAL STABILITY: SOCIAL INSECURITY: WITHIN THE LAST YEAR, HAVE YOU BEEN AFRAID OF YOUR PARTNER OR EX-PARTNER?: NO

## 2025-07-10 SDOH — SOCIAL STABILITY: SOCIAL NETWORK: IN A TYPICAL WEEK, HOW MANY TIMES DO YOU TALK ON THE PHONE WITH FAMILY, FRIENDS, OR NEIGHBORS?: TWICE A WEEK

## 2025-07-10 SDOH — SOCIAL STABILITY: SOCIAL INSECURITY
WITHIN THE LAST YEAR, HAVE YOU BEEN RAPED OR FORCED TO HAVE ANY KIND OF SEXUAL ACTIVITY BY YOUR PARTNER OR EX-PARTNER?: NO

## 2025-07-10 SDOH — SOCIAL STABILITY: SOCIAL INSECURITY: WITHIN THE LAST YEAR, HAVE YOU BEEN HUMILIATED OR EMOTIONALLY ABUSED IN OTHER WAYS BY YOUR PARTNER OR EX-PARTNER?: NO

## 2025-07-10 SDOH — SOCIAL STABILITY: SOCIAL NETWORK: HOW OFTEN DO YOU ATTEND CHURCH OR RELIGIOUS SERVICES?: NEVER

## 2025-07-10 SDOH — SOCIAL STABILITY: SOCIAL INSECURITY: ABUSE: ADULT

## 2025-07-10 SDOH — ECONOMIC STABILITY: INCOME INSECURITY: IN THE PAST 12 MONTHS HAS THE ELECTRIC, GAS, OIL, OR WATER COMPANY THREATENED TO SHUT OFF SERVICES IN YOUR HOME?: NO

## 2025-07-10 SDOH — ECONOMIC STABILITY: FOOD INSECURITY: WITHIN THE PAST 12 MONTHS, YOU WORRIED THAT YOUR FOOD WOULD RUN OUT BEFORE YOU GOT THE MONEY TO BUY MORE.: NEVER TRUE

## 2025-07-10 SDOH — SOCIAL STABILITY: SOCIAL INSECURITY: HAVE YOU HAD THOUGHTS OF HARMING ANYONE ELSE?: NO

## 2025-07-10 SDOH — ECONOMIC STABILITY: HOUSING INSECURITY: AT ANY TIME IN THE PAST 12 MONTHS, WERE YOU HOMELESS OR LIVING IN A SHELTER (INCLUDING NOW)?: NO

## 2025-07-10 SDOH — HEALTH STABILITY: MENTAL HEALTH
DO YOU FEEL STRESS - TENSE, RESTLESS, NERVOUS, OR ANXIOUS, OR UNABLE TO SLEEP AT NIGHT BECAUSE YOUR MIND IS TROUBLED ALL THE TIME - THESE DAYS?: NOT AT ALL

## 2025-07-10 SDOH — SOCIAL STABILITY: SOCIAL INSECURITY: WERE YOU ABLE TO COMPLETE ALL THE BEHAVIORAL HEALTH SCREENINGS?: YES

## 2025-07-10 SDOH — SOCIAL STABILITY: SOCIAL INSECURITY: DO YOU FEEL UNSAFE GOING BACK TO THE PLACE WHERE YOU ARE LIVING?: NO

## 2025-07-10 SDOH — HEALTH STABILITY: PHYSICAL HEALTH: ON AVERAGE, HOW MANY DAYS PER WEEK DO YOU ENGAGE IN MODERATE TO STRENUOUS EXERCISE (LIKE A BRISK WALK)?: 1 DAY

## 2025-07-10 SDOH — SOCIAL STABILITY: SOCIAL INSECURITY: HAVE YOU HAD ANY THOUGHTS OF HARMING ANYONE ELSE?: NO

## 2025-07-10 SDOH — ECONOMIC STABILITY: TRANSPORTATION INSECURITY: IN THE PAST 12 MONTHS, HAS LACK OF TRANSPORTATION KEPT YOU FROM MEDICAL APPOINTMENTS OR FROM GETTING MEDICATIONS?: NO

## 2025-07-10 SDOH — SOCIAL STABILITY: SOCIAL INSECURITY: DOES ANYONE TRY TO KEEP YOU FROM HAVING/CONTACTING OTHER FRIENDS OR DOING THINGS OUTSIDE YOUR HOME?: NO

## 2025-07-10 SDOH — SOCIAL STABILITY: SOCIAL INSECURITY: ARE YOU OR HAVE YOU BEEN THREATENED OR ABUSED PHYSICALLY, EMOTIONALLY, OR SEXUALLY BY ANYONE?: NO

## 2025-07-10 ASSESSMENT — PAIN - FUNCTIONAL ASSESSMENT
PAIN_FUNCTIONAL_ASSESSMENT: 0-10
PAIN_FUNCTIONAL_ASSESSMENT: 0-10
PAIN_FUNCTIONAL_ASSESSMENT: UNABLE TO SELF-REPORT
PAIN_FUNCTIONAL_ASSESSMENT: 0-10

## 2025-07-10 ASSESSMENT — COGNITIVE AND FUNCTIONAL STATUS - GENERAL
CLIMB 3 TO 5 STEPS WITH RAILING: A LITTLE
WALKING IN HOSPITAL ROOM: A LITTLE
TURNING FROM BACK TO SIDE WHILE IN FLAT BAD: A LOT
MOBILITY SCORE: 9
HELP NEEDED FOR BATHING: A LOT
MOVING FROM LYING ON BACK TO SITTING ON SIDE OF FLAT BED WITH BEDRAILS: A LOT
DAILY ACTIVITIY SCORE: 16
PERSONAL GROOMING: A LITTLE
PATIENT BASELINE BEDBOUND: NO
DAILY ACTIVITIY SCORE: 24
CLIMB 3 TO 5 STEPS WITH RAILING: A LITTLE
MOVING TO AND FROM BED TO CHAIR: TOTAL
STANDING UP FROM CHAIR USING ARMS: A LOT
WALKING IN HOSPITAL ROOM: TOTAL
TOILETING: A LOT
MOBILITY SCORE: 22
DAILY ACTIVITIY SCORE: 24
DRESSING REGULAR LOWER BODY CLOTHING: A LOT
WALKING IN HOSPITAL ROOM: A LITTLE
DRESSING REGULAR UPPER BODY CLOTHING: A LITTLE
MOBILITY SCORE: 22
CLIMB 3 TO 5 STEPS WITH RAILING: TOTAL

## 2025-07-10 ASSESSMENT — PATIENT HEALTH QUESTIONNAIRE - PHQ9
2. FEELING DOWN, DEPRESSED OR HOPELESS: NOT AT ALL
1. LITTLE INTEREST OR PLEASURE IN DOING THINGS: NOT AT ALL
SUM OF ALL RESPONSES TO PHQ9 QUESTIONS 1 & 2: 0

## 2025-07-10 ASSESSMENT — ACTIVITIES OF DAILY LIVING (ADL)
ADEQUATE_TO_COMPLETE_ADL: YES
LACK_OF_TRANSPORTATION: NO
FEEDING YOURSELF: INDEPENDENT
ASSISTIVE_DEVICE: WALKER;EYEGLASSES;HEARING AID - RIGHT;HEARING AID - LEFT
JUDGMENT_ADEQUATE_SAFELY_COMPLETE_DAILY_ACTIVITIES: YES
HEARING - RIGHT EAR: DIFFICULTY WITH NOISE
DRESSING YOURSELF: INDEPENDENT
TOILETING: INDEPENDENT
BATHING_ASSISTANCE: MODERATE
HEARING - LEFT EAR: DIFFICULTY WITH NOISE
WALKS IN HOME: INDEPENDENT
GROOMING: INDEPENDENT
BATHING: INDEPENDENT
PATIENT'S MEMORY ADEQUATE TO SAFELY COMPLETE DAILY ACTIVITIES?: YES

## 2025-07-10 ASSESSMENT — PAIN SCALES - GENERAL
PAINLEVEL_OUTOF10: 0 - NO PAIN
PAINLEVEL_OUTOF10: 7
PAINLEVEL_OUTOF10: 10 - WORST POSSIBLE PAIN
PAINLEVEL_OUTOF10: 9
PAINLEVEL_OUTOF10: 10 - WORST POSSIBLE PAIN

## 2025-07-10 ASSESSMENT — PAIN DESCRIPTION - PAIN TYPE
TYPE: ACUTE PAIN
TYPE: ACUTE PAIN

## 2025-07-10 ASSESSMENT — LIFESTYLE VARIABLES
SKIP TO QUESTIONS 9-10: 1
AUDIT-C TOTAL SCORE: 0
HOW MANY STANDARD DRINKS CONTAINING ALCOHOL DO YOU HAVE ON A TYPICAL DAY: PATIENT DOES NOT DRINK
HOW OFTEN DO YOU HAVE 6 OR MORE DRINKS ON ONE OCCASION: NEVER
AUDIT-C TOTAL SCORE: 0
HOW OFTEN DO YOU HAVE A DRINK CONTAINING ALCOHOL: NEVER

## 2025-07-10 ASSESSMENT — PAIN DESCRIPTION - LOCATION: LOCATION: BACK

## 2025-07-10 NOTE — PROGRESS NOTES
Trauma Progress Note    Patient: Mary Mims  Unit/Bed: AC20/AC20  YOB: 1935  MRN: [id number]  Acct: [id number]   Admitting Diagnosis: Weakness [R53.1]  Date:  7/9/2025  Hospital Day: 1  Attending: Bob Hayes MD    Complaint:  Chief Complaint   Patient presents with    Fall     [place] from home. Pt had a fall from chair at "[employer] market landed on butt. Pt having back pain. Pt got worse when she went home.      Subjective  Patient seen and examined this morning. No acute events overnight.  Patient states she does have some pain to her right chest and her lower legs.    PHYSICAL EXAM:  Physical Exam  Vitals reviewed.   Constitutional:       General: She is not in acute distress.     Appearance: Normal appearance.   HENT:      Head: Normocephalic.      Nose: Nose normal.      Mouth/Throat:      Mouth: Mucous membranes are moist.   Cardiovascular:      Rate and Rhythm: Normal rate.   Pulmonary:      Effort: Pulmonary effort is normal.   Chest:      Chest wall: Tenderness (left) present.   Abdominal:      General: Abdomen is flat. Bowel sounds are normal.      Palpations: Abdomen is soft.   Musculoskeletal:      Left upper leg: Tenderness present.   Skin:     General: Skin is warm.      Capillary Refill: Capillary refill takes less than 2 seconds.   Neurological:      General: No focal deficit present.      Mental Status: She is alert and oriented to person, place, and time.   Psychiatric:         Mood and Affect: Mood normal.       Vital signs in last 24 hours:  Vitals:    07/10/25 0647   BP: 115/64   Pulse: 64   Resp: 16   Temp:    SpO2: 100%     Intake/Output this shift:  No intake or output data in the 24 hours ending 07/10/25 0733   Allergies:  Allergies[1]   Medications:  Scheduled medications  Scheduled Medications[2]  Continuous medications  Continuous Medications[3]  PRN medications  PRN Medications[4]  Labs:  Results for orders placed or performed during the hospital encounter  of 07/09/25 (from the past 24 hours)   CBC and Auto Differential   Result Value Ref Range    WBC 8.0 4.4 - 11.3 x10*3/uL    nRBC 0.0 0.0 - 0.0 /100 WBCs    RBC 3.91 (L) 4.00 - 5.20 x10*6/uL    Hemoglobin 12.5 12.0 - 16.0 g/dL    Hematocrit 38.4 36.0 - 46.0 %    MCV 98 80 - 100 fL    MCH 32.0 26.0 - 34.0 pg    MCHC 32.6 32.0 - 36.0 g/dL    RDW 14.2 11.5 - 14.5 %    Platelets 191 150 - 450 x10*3/uL    Neutrophils % 60.9 40.0 - 80.0 %    Immature Granulocytes %, Automated 0.5 0.0 - 0.9 %    Lymphocytes % 28.2 13.0 - 44.0 %    Monocytes % 8.9 2.0 - 10.0 %    Eosinophils % 0.9 0.0 - 6.0 %    Basophils % 0.6 0.0 - 2.0 %    Neutrophils Absolute 4.86 1.60 - 5.50 x10*3/uL    Immature Granulocytes Absolute, Automated 0.04 0.00 - 0.50 x10*3/uL    Lymphocytes Absolute 2.25 0.80 - 3.00 x10*3/uL    Monocytes Absolute 0.71 0.05 - 0.80 x10*3/uL    Eosinophils Absolute 0.07 0.00 - 0.40 x10*3/uL    Basophils Absolute 0.05 0.00 - 0.10 x10*3/uL   Comprehensive metabolic panel   Result Value Ref Range    Glucose 113 (H) 74 - 99 mg/dL    Sodium 136 136 - 145 mmol/L    Potassium 4.1 3.5 - 5.3 mmol/L    Chloride 102 98 - 107 mmol/L    Bicarbonate 28 21 - 32 mmol/L    Anion Gap 10 10 - 20 mmol/L    Urea Nitrogen 22 6 - 23 mg/dL    Creatinine 0.97 0.50 - 1.05 mg/dL    eGFR 56 (L) >60 mL/min/1.73m*2    Calcium 9.0 8.6 - 10.3 mg/dL    Albumin 3.4 3.4 - 5.0 g/dL    Alkaline Phosphatase 58 33 - 136 U/L    Total Protein 5.5 (L) 6.4 - 8.2 g/dL    AST 20 9 - 39 U/L    Bilirubin, Total 0.4 0.0 - 1.2 mg/dL    ALT 29 7 - 45 U/L   Comprehensive metabolic panel   Result Value Ref Range    Glucose 125 (H) 74 - 99 mg/dL    Sodium 136 136 - 145 mmol/L    Potassium 4.0 3.5 - 5.3 mmol/L    Chloride 103 98 - 107 mmol/L    Bicarbonate 27 21 - 32 mmol/L    Anion Gap 10 10 - 20 mmol/L    Urea Nitrogen 17 6 - 23 mg/dL    Creatinine 0.82 0.50 - 1.05 mg/dL    eGFR 68 >60 mL/min/1.73m*2    Calcium 9.0 8.6 - 10.3 mg/dL    Albumin 3.5 3.4 - 5.0 g/dL    Alkaline  Phosphatase 58 33 - 136 U/L    Total Protein 5.8 (L) 6.4 - 8.2 g/dL    AST 19 9 - 39 U/L    Bilirubin, Total 0.6 0.0 - 1.2 mg/dL    ALT 27 7 - 45 U/L   CBC and Auto Differential   Result Value Ref Range    WBC 9.1 4.4 - 11.3 x10*3/uL    nRBC 0.0 0.0 - 0.0 /100 WBCs    RBC 4.13 4.00 - 5.20 x10*6/uL    Hemoglobin 13.3 12.0 - 16.0 g/dL    Hematocrit 39.5 36.0 - 46.0 %    MCV 96 80 - 100 fL    MCH 32.2 26.0 - 34.0 pg    MCHC 33.7 32.0 - 36.0 g/dL    RDW 14.2 11.5 - 14.5 %    Platelets 188 150 - 450 x10*3/uL    Neutrophils % 64.5 40.0 - 80.0 %    Immature Granulocytes %, Automated 0.4 0.0 - 0.9 %    Lymphocytes % 25.2 13.0 - 44.0 %    Monocytes % 8.6 2.0 - 10.0 %    Eosinophils % 0.9 0.0 - 6.0 %    Basophils % 0.4 0.0 - 2.0 %    Neutrophils Absolute 5.84 (H) 1.60 - 5.50 x10*3/uL    Immature Granulocytes Absolute, Automated 0.04 0.00 - 0.50 x10*3/uL    Lymphocytes Absolute 2.29 0.80 - 3.00 x10*3/uL    Monocytes Absolute 0.78 0.05 - 0.80 x10*3/uL    Eosinophils Absolute 0.08 0.00 - 0.40 x10*3/uL    Basophils Absolute 0.04 0.00 - 0.10 x10*3/uL   Magnesium   Result Value Ref Range    Magnesium 2.01 1.60 - 2.40 mg/dL      Imaging:  Imaging  CT lumbar spine retrospective reconstruction protocol  Result Date: 7/9/2025  CHEST/THORACIC SPINE : 1.  Acute fractures at the 11th and 12th right ribs. No pneumothorax. Mild bibasilar atelectasis. 2. 4 mm pulmonary nodule at the right upper lobe. 3. Dominant nodule at the left lobe of the thyroid gland. Nonemergent thyroid ultrasound can be obtained for further evaluation. 4.  No acute fracture of the thoracic spine. Degenerative changes.   ABDOMEN - PELVIS/LUMBAR SPINE: 1.  No acute posttraumatic findings within the abdomen or pelvis. 2. Mild-moderate right hydroureteronephrosis. Mild left hydroureteronephrosis. 3. Colonic diverticulosis. Moderate amount of stool throughout the colon. 4.  No acute fracture of the lumbar spine. Degenerative changes.     MACRO: Incidental Finding:  A  non-calcified pulmonary nodule/multiple non-calcified pulmonary nodules measuring less than 6 mm, likely benign.  (**-YCF-**)   Instructions:  No further follow-up is required, however, if the patient has high risk factors for primary lung malignancy, follow-up noncontrast CT scan chest in 12 months may be obtained. ([place] MacMahon et al., Guidelines for management of incidental pulmonary nodules detected on CT images: From the Fleischner Society 2017, Radiology. 2017 Jul;284 (1):228-243.) [email or web address]     Signed by: Sulema Clark 7/9/2025 8:09 PM Dictation workstation:   [id number]    CT chest abdomen pelvis w IV contrast  Result Date: 7/9/2025  CHEST/THORACIC SPINE : 1.  Acute fractures at the 11th and 12th right ribs. No pneumothorax. Mild bibasilar atelectasis. 2. 4 mm pulmonary nodule at the right upper lobe. 3. Dominant nodule at the left lobe of the thyroid gland. Nonemergent thyroid ultrasound can be obtained for further evaluation. 4.  No acute fracture of the thoracic spine. Degenerative changes.   ABDOMEN - PELVIS/LUMBAR SPINE: 1.  No acute posttraumatic findings within the abdomen or pelvis. 2. Mild-moderate right hydroureteronephrosis. Mild left hydroureteronephrosis. 3. Colonic diverticulosis. Moderate amount of stool throughout the colon. 4.  No acute fracture of the lumbar spine. Degenerative changes.     MACRO: Incidental Finding:  A non-calcified pulmonary nodule/multiple non-calcified pulmonary nodules measuring less than 6 mm, likely benign.  (**-YCF-**)   Instructions:  No further follow-up is required, however, if the patient has high risk factors for primary lung malignancy, follow-up noncontrast CT scan chest in 12 months may be obtained. ([place] MacMahon et al., Guidelines for management of incidental pulmonary nodules detected on CT images: From the Fleischner Society 2017, Radiology. 2017 Jul;284 (1):228-243.) [email or web address]     Signed by: Sulema Clark 7/9/2025 8:09 PM  Dictation workstation:   [id number]    CT thoracic spine retrospective reconstruction protocol  Result Date: 7/9/2025  CHEST/THORACIC SPINE : 1.  Acute fractures at the 11th and 12th right ribs. No pneumothorax. Mild bibasilar atelectasis. 2. 4 mm pulmonary nodule at the right upper lobe. 3. Dominant nodule at the left lobe of the thyroid gland. Nonemergent thyroid ultrasound can be obtained for further evaluation. 4.  No acute fracture of the thoracic spine. Degenerative changes.   ABDOMEN - PELVIS/LUMBAR SPINE: 1.  No acute posttraumatic findings within the abdomen or pelvis. 2. Mild-moderate right hydroureteronephrosis. Mild left hydroureteronephrosis. 3. Colonic diverticulosis. Moderate amount of stool throughout the colon. 4.  No acute fracture of the lumbar spine. Degenerative changes.     MACRO: Incidental Finding:  A non-calcified pulmonary nodule/multiple non-calcified pulmonary nodules measuring less than 6 mm, likely benign.  (**-YCF-**)   Instructions:  No further follow-up is required, however, if the patient has high risk factors for primary lung malignancy, follow-up noncontrast CT scan chest in 12 months may be obtained. (Gildardo Betancur et al., Guidelines for management of incidental pulmonary nodules detected on CT images: From the Fleischner Society 2017, Radiology. 2017 Jul;284 (1):228-243.) [email or web address]     Signed by: Sulema Clark 7/9/2025 8:09 PM Dictation workstation:   [id number]    CT head wo IV contrast  Result Date: 7/9/2025  1.  No acute intracranial hemorrhage or depressed calvarial fracture. 2.  No acute fracture at the cervical spine. Degenerative changes.       MACRO: None.   Signed by: Sulema Clark 7/9/2025 7:48 PM Dictation workstation:   [id number]    CT cervical spine wo IV contrast  Result Date: 7/9/2025  1.  No acute intracranial hemorrhage or depressed calvarial fracture. 2.  No acute fracture at the cervical spine. Degenerative changes.       MACRO: None.    Signed by: Sulema Clark 7/9/2025 7:48 PM Dictation workstation:   [id number]      Cardiology, Vascular, and Other Imaging  No other imaging results found for the past 2 days     Assessment  S/P Fall-teriatry exam  R 11-12 rib fx    Patient is alert and oriented x 4.  PERRLA.  Sensation intact.  Motor function intact.  Tenderness with palpation to right chest wall.  Lung sounds clear.  Patient taking in 750-1000 on IS. tenderness to left upper leg with palpation this is not new.  No tenderness with palpation to abdomen.  This morning unremarkable.  Afebrile. No     Plan  -Continue care per primary team  -Pain control  -Nausea control  -DVT prophylaxis-okay from a trauma standpoint   -Pulmonary toileting   -Encourage ambulation  -PT/OT  -Rehab vs SNF  -Trauma service to sign off at this time. Please reach back out with any questions or concerns.       Further recommendations per Dr. Carmel Wright, APRN-CNP    Time spent  37  minutes obtaining labs, imaging, recommendations, interview, assessment, examination, medication review/ordering, and EMR review.    Plan of care was discussed extensively with patient. Patient verbalized understanding through teach back method. All questions and concerns addressed upon examination.     Of note, this documentation is completed using the Dragon Dictation system (voice recognition software). There may be spelling and/or grammatical errors that were not corrected prior to final submission.         [1]   Allergies  Allergen Reactions    Phenazopyridine GI Upset and Nausea And Vomiting     Other reaction(s): GI Upset   [2] apixaban, 2.5 mg, oral, BID  docusate sodium, 100 mg, oral, BID  lidocaine, 1 patch, transdermal, Daily  losartan, 25 mg, oral, Daily  metoprolol tartrate, 25 mg, oral, BID  oxyBUTYnin, 5 mg, oral, BID  pantoprazole, 40 mg, oral, Daily   Or  pantoprazole, 40 mg, intravenous, Daily  pravastatin, 40 mg, oral, Nightly  pregabalin, 25 mg, oral,  TID  sulfamethoxazole-trimethoprim, 1 tablet, oral, BID  [3]    [4] PRN medications: acetaminophen, calcium carbonate, traMADol

## 2025-07-10 NOTE — PROGRESS NOTES
Physical Therapy    Physical Therapy Evaluation    Patient Name: Mary Mims  MRN: [id number]  Today's Date: 7/10/2025   Time Calculation  Start Time: 1124  Stop Time: 1138  Time Calculation (min): 14 min  1023/1023-B    Assessment/Plan   PT Assessment  PT Assessment Results: Decreased strength, Decreased endurance, Impaired balance, Decreased mobility  Rehab Prognosis: Good  Barriers to Discharge Home: Caregiver assistance, Physical needs  Caregiver Assistance: Caregiver assistance needed per identified barriers - however, level of patient's required assistance exceeds assistance available at home  Physical Needs: Stair navigation into home limited by function/safety, Ambulating household distances limited by function/safety, High falls risk due to function or environment  Evaluation/Treatment Tolerance: Patient limited by fatigue, Patient limited by pain  Medical Staff Made Aware: Yes  Strengths: Living arrangement secure  Barriers to Participation: Comorbidities  End of Session Communication: Bedside nurse  Assessment Comment: Pt. appears weak and deconditioned. Pt requires A or transfers and amininal amb. Recommend continued therapy at a moderate intensity level following D/C.  End of Session Patient Position: Bed, 3 rail up, Alarm off, not on at start of session (Call light within reach)  IP OR SWING BED PT PLAN  Inpatient or Swing Bed: Inpatient  PT Plan  Treatment/Interventions: Bed mobility, Transfer training, Gait training, Strengthening, Therapeutic exercise  PT Plan: Ongoing PT  PT Frequency: 3 times per week (during acute, inpatient hospitalization)  PT Discharge Recommendations: Moderate intensity level of continued care (Based on current functional status and rehab potential, patient is anticipated to tolerate and benefit from 5 or more days per week of skilled rehabilitative therapy after discharge from this acute inpatient hospitalization.)  PT Recommended Transfer Status: Assist x1, Assistive  device  Physical Therapy eval completed per MD requisition. P.T. recommendations as outlined above. Recommend D/C from acute care when medically appropriate as deemed by medical staff.        General Visit Information:  General  Reason for Referral: impaired mobility  Referred By: LAMONT Acosta (OT/PT 7/10)  Past Medical History Relevant to Rehab: includes: Dry eyes, osteoporosis, hypertrphic cardiomyopathy, HTN, Afib, CKD, HLD  Family/Caregiver Present: Yes  Caregiver Feedback:  present  Co-Treatment: OT  Co-Treatment Reason: Pt. seen with OT to maximize safety and function  Prior to Session Communication: Bedside nurse  Patient Position Received: Bed, 3 rail up, Alarm on  Preferred Learning Style: auditory, verbal  General Comment: Pt. is an 88yo who [place] [facility] [place] with c/o general weakness and s/p all off of RW hitting her back and R side.   Imaging 7/9/2025:   CT head (-),   CT C spine (-),   CT L spine (+) acute fx of 11-12th R ribs, (-) pneumothorax, (-) atelectasis, (+) pulmonary nodule in R upper lung.  CT abd/pelvis (+) moderate hydrouretenphrosis, (+) colonic diverticulosis    Dx: Weakness,  closed fracture of multiple ribs of R side,    Home Living:  Home Living  Home Living Comments: Pt. lives with spouse in a 1 level house with 4 [place] with HR. Bed/bath on 1st floor with tub shower with a seat and grab bars. Laundry in basement with 1 flight with HR to access.    Prior Level of Function:  Prior Function Per Pt/Caregiver Report  Prior Function Comments: Pt. amb with FWW  and started using RW a few days PTA. Pt. stated she was I with dressing, sponge bathing and showers 1x per week PTA. Pt. stated she shared cooking with spouse, shared cleaning with spouse and son, and completed laundry in basement on her own PTA.  Pt. denied any other falls in last 3 months. Pt. does not drive.    Precautions:  Precautions  Medical Precautions: Oxygen therapy device and L/min (Activity order: OOB with  A)  Precautions Comment: Per EMR: High fall risk    Vital Signs:  Vital Signs  Vital Signs Comment: not able to obtain reading on pulse ox. pt. wearing 3L nasal cannula  Objective     Pain:  Pain Assessment  Pain Assessment: 0-10  0-10 (Numeric) Pain Score:  (Supine in bed: R side of back 6/10; R foot cramp 10/10. Sitting on EOB R calf cramp 10/10)  Pain Interventions: Repositioned (RN medicated during session)  Response to Interventions: No change in pain    Cognition:  Cognition  Overall Cognitive Status: Within Functional Limits    General Assessments:  General Observation  General Observation: 3L O2, tele, [place]   Activity Tolerance  Endurance: Decreased tolerance for upright activites  Activity Tolerance Comments: pain and leg cramping limiting activity                    Static Standing Balance  Static Standing-Comment/Number of Minutes: Good static and dynamic sitting balance  Dynamic Standing Balance  Dynamic Standing-Comments: Fair static and dynamic standing balance    Functional Assessments:     Bed Mobility  Bed Mobility: Yes  Bed Mobility 1  Bed Mobility 1: Supine to sitting  Level of Assistance 1: Moderate assistance  Bed Mobility Comments 1: A to maneuver LEs over EOB and to elevate trunk from bed  Bed Mobility 2  Bed Mobility  2: Sitting to supine  Level of Assistance 2: Moderate assistance  Bed Mobility Comments 2: A to lift B LEs onto bed and control descent/placement of trunk via modifoed logroll  Transfers  Transfer: Yes  Transfer 1  Technique 1: Sit to stand  Transfer Device 1:  (FWW)  Transfer Level of Assistance 1: Moderate assistance  Trials/Comments 1: A for lifting, transferring weight forward over feet, steadying. VC's for hand placement.  Transfers 2  Technique 2: Stand to sit  Transfer Device 2:  (FWW)  Transfer Level of Assistance 2: Minimum assistance  Trials/Comments 2: A to control descent. VC's for hand placement.  Ambulation/Gait Training  Ambulation/Gait Training Performed:  Yes  Ambulation/Gait Training 1  Surface 1: Level tile  Device 1: Rolling walker  Assistance 1: Moderate assistance  Quality of Gait 1: Narrow base of support (slow, step-to gait with shortened step lengths)  Comments/Distance (ft) 1: 5' forward and back and 4 sidesteps; A for balance, weight shift, maneuvering FWW, safety  Stairs  Stairs: No       Extremity/Trunk Assessments:  RUE   RUE : Within Functional Limits  LUE   LUE: Within Functional Limits  RLE   RLE :  (AROM WFL, strength 4-/5)  LLE   LLE :  (AROM WFL, strength 4-/5)    Outcome Measures:     [facility] Basic Mobility  Turning from your back to your side while in a flat bed without using bedrails: A lot  Moving from lying on your back to sitting on the side of a flat bed without using bedrails: A lot  Moving to and from bed to chair (including a wheelchair): Total  Standing up from a chair using your arms (e.g. wheelchair or bedside chair): A lot  To walk in hospital room: Total  Climbing 3-5 steps with railing: Total  Basic Mobility - Total Score: 9                                                             Goals:  Encounter Problems       Encounter Problems (Active)       PT Problem       Pt. will transfer supine/sit with CGA (Progressing)       Start:  07/10/25    Expected End:  07/24/25            Pt. will transfer sit/stand with FWW with CGA (Progressing)       Start:  07/10/25    Expected End:  07/24/25            Pt.will ambulate 40' with FWW with MIN A x 1 (Progressing)       Start:  07/10/25    Expected End:  07/24/25            Pt. will perform 2 x 15 B LE AROM exercises  (Not Progressing)       Start:  07/10/25    Expected End:  07/24/25                 Education Documentation  Mobility Training, taught by Luis Dueñas, PT at 7/10/2025 12:46 PM.  Learner: Significant Other, Patient  Readiness: Acceptance  Method: Explanation  Response: Verbalizes Understanding, Needs Reinforcement  Comment: Role of PT, transfers, amb, safety, PT POC

## 2025-07-10 NOTE — CARE PLAN
The patient's goals for the shift include Pain control    The clinical goals for the shift include Pain control      Problem: Pain - Adult  Goal: Verbalizes/displays adequate comfort level or baseline comfort level  7/10/2025 1337 by Rhianna Garcia RN  Outcome: Progressing  7/10/2025 1337 by Rhianna Garcia RN  Outcome: Progressing     Problem: Safety - Adult  Goal: Free from fall injury  7/10/2025 1337 by Rhianna Garcia RN  Outcome: Progressing  7/10/2025 1337 by Rhianna Garcia RN  Outcome: Progressing     Problem: Discharge Planning  Goal: Discharge to home or other facility with appropriate resources  7/10/2025 1337 by Rhianna Garcia RN  Outcome: Progressing  7/10/2025 1337 by Rhianna Garcia RN  Outcome: Progressing     Problem: Chronic Conditions and Co-morbidities  Goal: Patient's chronic conditions and co-morbidity symptoms are monitored and maintained or improved  7/10/2025 1337 by Rhianna Garcia RN  Outcome: Progressing  7/10/2025 1337 by Rhianna Garcia RN  Outcome: Progressing     Problem: Nutrition  Goal: Nutrient intake appropriate for maintaining nutritional needs  7/10/2025 1337 by Rhianna Garcia RN  Outcome: Progressing  7/10/2025 1337 by Rhianna Garcia RN  Outcome: Progressing

## 2025-07-10 NOTE — PROGRESS NOTES
07/10/25 1530   Patient Choice   Provider Choice list and CMS website (https://medicare.gov/care-compare#search) for post-acute Quality and Resource Measure Data were provided and reviewed with: Family   Patient / Family choosing to utilize agency / facility established prior to hospitalization No   Stroke Family Assessment   Stroke Family Assessment Needed No   Intensity of Service   Intensity of Service 0-30 min     Spoke with patient's son Duane who is POA, states patient uses a rollator, was getting around fine, still going out shopping with son, and was [employer] when she fell trying to sit on her rollator. Per son, patient had been at [facility] previously, if SNF is recommended, they would prefer that facility again as it is convenient for all the family. Referral made to [facility], if they can accept will have team start precert. CT team will continue to follow.

## 2025-07-10 NOTE — PROGRESS NOTES
Occupational Therapy    Evaluation    Patient Name: Mary Mims  MRN: [id number]  Department: [facility]  Room: [facility]/1023-B  Today's Date: 7/10/2025  Time Calculation  Start Time: 1123  Stop Time: 1138  Time Calculation (min): 15 min        Assessment:  OT Assessment: Pt. presents with impaired balance, endurance, safety, generalized strength. Assist required for all ADLs/tranfers. Would benefit from continued OT to address deficits.  Prognosis: Good  Barriers to Discharge Home: Caregiver assistance, Physical needs  Evaluation/Treatment Tolerance: Patient limited by pain  End of Session Communication: Bedside nurse  End of Session Patient Position: Bed, 3 rail up, Alarm on (lunch tray arriving.)  OT Assessment Results: Decreased ADL status, Decreased safe judgment during ADL, Decreased endurance, Decreased functional mobility, Decreased IADLs, Decreased trunk control for functional activities  Prognosis: Good  Evaluation/Treatment Tolerance: Patient limited by pain  Plan:  Treatment Interventions: ADL retraining, Functional transfer training, Endurance training, Compensatory technique education  OT Frequency: 2 times per week (during the acute hospitalization)  OT Discharge Recommendations: Moderate intensity level of continued care (Based on current functional status and rehab potential, patient is anticipated to tolerate and benefit from 5 or more days per week of skilled rehabilitative therapy after discharge from this acute inpatient hospitalization.)  OT Recommended Transfer Status: Moderate assist  OT - OK to Discharge: Yes (Refer to MD for discharge.)    Subjective   Current Problem:  1. Weakness        2. Closed fracture of multiple ribs of right side, initial encounter          OT Visit Info:  OT Received On: 07/10/25  General:  General  Reason for Referral: ADL impairment  Referred By: Karla OT/PT 7/10  Past Medical History Relevant to Rehab: Dry eyes, osteoporosis, hypertrphic cardiomyopathy, HTN, Afib,  CKD, HLD  Family/Caregiver Present: Yes  Caregiver Feedback:  present  Co-Treatment: PT  Co-Treatment Reason: to maximize pt. safety  Prior to Session Communication: Bedside nurse  Patient Position Received: Bed, 3 rail up, Alarm on  General Comment: Pt. is 88 y/o female to ED 7/9 s/p general weakness and fall off of RW hitting her back and R side. CT head (-), CT C spine (-), CT L spine: acute fx of 11-12th R ribs, no pneumothorax, atelectasis, 4mm pulmonary nodule in R upper lung. CT abd/pelvis: moderate hydrouretenphrosis, colonic diverticulosis  Precautions:  Medical Precautions: Fall precautions    Vital Signs Comment: not able to obtain reading on pulse ox. pt. wearing 3L nasal cannula     Pain:  Pain Assessment  Pain Assessment: 0-10  0-10 (Numeric) Pain Score:  (6/10 back, 10/10 R leg/foot cramping)  Pain Interventions: Repositioned (medication provided by PRN)  Response to Interventions: No change in pain    Objective   Cognition:  Overall Cognitive Status: Within Functional Limits    Home Living:  Home Living Comments: Pt. lives with her  in two story house, [address] with B HRs. bed/bath on main floor with tub shower, seat, and grab bars. Laundry in basement. (HRs on basement stairs)  Prior Function:  Prior Function Comments: States she normally uses 2WW, however has been using RW for 3 days. Pt. is independent with ADLs, takes a shower once/week, sponge bathes the other days. Shares IADLs with , however son does help with IADLs. 1 fall PTA. Does not drive;  does driving    ADL:  Eating Assistance: Independent  Grooming Assistance: Minimal  Bathing Assistance: Moderate  UE Dressing Assistance: Minimal  LE Dressing Assistance: Maximal  Toileting Assistance with Device: Maximal  Activity Tolerance:  Endurance: Decreased tolerance for upright activites  Activity Tolerance Comments: pain and leg cramping limiting activity  Bed Mobility/Transfers: Bed Mobility  Bed Mobility:  (sup to  sit, Mod A to bring LEs off edge of bed and lifting trunk to upright sit. VC for use of bed rails to assist in bringing trunk to sit. increased pain with movement. Mod A to bring LEs back into bed at end of session.)    Transfers  Transfer:  (sit to stand from bed level , Mod A with WW  for safety, lifting, and steadying over walker)    Functional Mobility:  Functional Mobility  Functional Mobility Performed:  (~3-4 steps forward and backwards from bed; WW use. Mod A  for walker mgmt, balance, and safety. Side stepping to L side with WW, VC to continue)  Sitting Balance:  Static Sitting Balance  Static Sitting-Level of Assistance: Contact guard  Standing Balance:  Static Standing Balance  Static Standing-Level of Assistance: Minimum assistance  Dynamic Standing Balance  Dynamic Standing-Comments: Poor +     Vision:    and Vision - Complex Assessment  Vision Comments: wearing glasses    Strength:  Strength Comments: BUEs WFL grossly. did not formally assess    Coordination:  Coordination Comment: WFL   Hand Function:  Gross Grasp: Functional  Extremities: RUE   RUE : Within Functional Limits and LUE   LUE: Within Functional Limits    Outcome Measures:[facility] Daily Activity  Putting on and taking off regular lower body clothing: A lot  Bathing (including washing, rinsing, drying): A lot  Putting on and taking off regular upper body clothing: A little  Toileting, which includes using toilet, bedpan or urinal: A lot  Taking care of personal grooming such as brushing teeth: A little  Eating Meals: None  Daily Activity - Total Score: 16    Education Documentation  Body Mechanics, taught by Nilam Parker OT at 7/10/2025 12:13 PM.  Learner: Patient  Readiness: Acceptance  Method: Explanation  Response: Verbalizes Understanding, Needs Reinforcement    ADL Training, taught by Nilam Parker OT at 7/10/2025 12:13 PM.  Learner: Patient  Readiness: Acceptance  Method: Explanation  Response: Verbalizes  Understanding, Needs Reinforcement    IP EDUCATION:  Education  Individual(s) Educated: Patient  Education Provided: Fall precautons, Risk and benefits of OT discussed with patient or other, POC discussed and agreed upon    Goals:  Encounter Problems       Encounter Problems (Active)       OT Goals       SBA for all functional transfers  (Progressing)       Start:  07/10/25    Expected End:  07/24/25            SBA for LB dressing; AE use for ease and pain control  (Progressing)       Start:  07/10/25    Expected End:  07/24/25            SBA for toileting tasks and clothing mgmt  (Progressing)       Start:  07/10/25    Expected End:  07/24/25            Pt. will tolerate >8 minutes ADLs/activities with one rest break.  (Progressing)       Start:  07/10/25    Expected End:  07/24/25            Fair + dyn standing balance during ADLs and functional activities  (Progressing)       Start:  07/10/25    Expected End:  07/24/25

## 2025-07-10 NOTE — PROGRESS NOTES
Mary Mims is a 89 y.o. female on day 1 of admission presenting with Weakness.      Subjective   Currently stable, no acute distress, lying on her bed, complaining of leg cramps otherwise unremarkable tolerating diet, following commands    Objective     Last Recorded Vitals  /80 (BP Location: Right arm, Patient Position: Lying)   Pulse 78   Temp 35.6 °C (96.1 °F) (Temporal)   Resp (!) 22   Wt 59 kg (130 lb)   SpO2 97%   Intake/Output last 3 Shifts:  No intake or output data in the 24 hours ending 07/10/25 1331    Admission Weight  Weight: 59 kg (130 lb) (07/09/25 1741)    Daily Weight  07/09/25 : 59 kg (130 lb)    Image Results  XR chest 1 view  Narrative: Interpreted By:  Berhane Rodrigez,   STUDY:  XR CHEST 1 VIEW;  7/10/2025 10:13 am      INDICATION:  Signs/Symptoms:s/p rib fracture.          COMPARISON:  CT chest 9 July 2025      ACCESSION NUMBER(S):  [id number]      ORDERING CLINICIAN:  VENU JOYA      TECHNIQUE:  Single frontal view of the chest; Portable technique      FINDINGS:      The cardiomediastinal silhouette is unchanged      No demonstrable pneumothorax      No pulmonary contusion or other consolidative process      No edema or effusion      Impression: NO ACUTE DISEASE IN THE CHEST      MACRO:  None      Signed by: Berhane Rodrigez 7/10/2025 10:40 AM  Dictation workstation:   [id number]      Physical Exam    General: Well-developed elderly frail female, in no acute distress  HEENT: AT, NC, no JVD, no lymphadenopathy, neck supple  Lungs: Clear, no wheezing, no crackles  Cardiac: Normal S1-S2, no murmur, no gallop  Abdomen: Soft, nontender, no distention, positive bowel sound  Extremities: No deformity, no edema, pulses intact, ROM limited due to frailty  Neurological: Alert awake oriented x3, sensation intact, clear speech            Assessment & Plan  Weakness        Mechanical fall d/t weakness and debility  Multiple rib fractures - Cleared by trauma  Persistent AFib - on eliquis,  rate controlled  Chronic lower back pain  - Fall precaution, pain management    Constipation  - Continue bowel regimen     Bilateral hydroureteronephrosis  - Monitor renal function, avoid nephrotoxic agent     Right ear skin infection   - continue Bactrim    RLS / LLE calf cramps: continue home meds  Pala      VTE Prophylaxis: Eliquis  Disposition: PT/OT recommended moderate intensity on discharge, TCC following for placement  No need for a.m. labs      Bob Hayes MD

## 2025-07-10 NOTE — ED PROVIDER NOTES
Emergency Department Provider Note       History of Present Illness     History provided by: Patient  Limitations to History: None  External Records Reviewed with Brief Summary: None    HPI:  Mary Mims is a 89 y.o. female with past medical history of dry eye, hypertension, osteoporosis, A-fib on Eliquis, who does present with generalized weakness and fall.  She did fall off of her rollator and did hit her back and right side.  She is complaining of worsening right side pain and difficulty walking.  Denies any fever chills denies any infectious symptoms.    Physical Exam   Triage vitals:  T 36.5 °C (97.7 °F)  HR 78  /62  RR 19  O2 100 % None (Room air)    Physical Exam  Vitals and nursing note reviewed.   Constitutional:       General: She is not in acute distress.     Appearance: Normal appearance. She is normal weight. She is not ill-appearing.   HENT:      Head: Normocephalic and atraumatic.      Nose: Nose normal.      Mouth/Throat:      Mouth: Mucous membranes are moist.      Pharynx: Oropharynx is clear.   Eyes:      Extraocular Movements: Extraocular movements intact.      Conjunctiva/sclera: Conjunctivae normal.      Pupils: Pupils are equal, round, and reactive to light.   Cardiovascular:      Rate and Rhythm: Normal rate and regular rhythm.      Pulses: Normal pulses.      Heart sounds: Normal heart sounds.   Pulmonary:      Breath sounds: Normal breath sounds. No stridor. No wheezing.   Abdominal:      General: Abdomen is flat. Bowel sounds are normal. There is no distension.      Palpations: Abdomen is soft.      Tenderness: There is no abdominal tenderness. There is no right CVA tenderness, left CVA tenderness, guarding or rebound.   Musculoskeletal:      Cervical back: Normal range of motion and neck supple. No rigidity or tenderness.      Comments: Positive tenderness to palpation over right side chest wall as well as right flank.  No midline cervical, thoracic or lumbar spine  tenderness.  Able to range the hips.  Knees and ankles.  Neuro vascularly intact distal.   Skin:     General: Skin is warm and dry.      Capillary Refill: Capillary refill takes less than 2 seconds.      Coloration: Skin is not pale.      Findings: No bruising.   Neurological:      General: No focal deficit present.      Mental Status: She is alert and oriented to person, place, and time. Mental status is at baseline.      Sensory: No sensory deficit.      Motor: No weakness.   Psychiatric:         Mood and Affect: Mood normal.         Behavior: Behavior normal.         Thought Content: Thought content normal.         Judgment: Judgment normal.           Medical Decision Making & ED Course   Medical Decision Makin y.o. female with past medical history of hypertension, A-fib on Eliquis, osteoporosis, here with mechanical fall with right-sided rib and abdominal pain.  She is feeling generally weak.  Was found to have 2 rib fractures on trauma imaging.  Otherwise no acute bleed or fractures are noted.  Patient unable to ambulate due to generalized weakness.  Likely require admission for pain control, PT OT possible placement.  Trauma consulted and cleared for medicine with consult.  ----      Differential diagnoses considered include but are not limited to: weakness deconditioned rib fracture bleeding    Social Determinants of Health which Significantly Impact Care: Social Determinants of Health which Significantly Impact Care: None identified     EKG Independent Interpretation: EKG not obtained    Independent Result Review and Interpretation: Relevant laboratory and radiographic results were reviewed and independently interpreted by myself.  As necessary, they are commented on in the ED Course.    Chronic conditions affecting the patient's care: As documented above in [facility]    The patient was discussed with the following consultants/services: None    Care Considerations: As documented above in [facility]    ED  Course:  Diagnoses as of 07/09/25 2235   Weakness   Closed fracture of multiple ribs of right side, initial encounter       Disposition   As a result of their workup, the patient will require admission to the hospital.  The patient was informed of her diagnosis.  The patient was given the opportunity to ask questions and I answered them. The patient agreed to be admitted to the hospital.    Procedures   Procedures        Cricket Olivarez MD  Emergency Medicine                                                       Cricket Olivarez MD  07/09/25 7783

## 2025-07-10 NOTE — H&P
Medical Group History and Physical    ASSESSMENT & PLAN:     Fall - mechanical d/t weakness and debility  Multiple rib fractures - Cleared by trauma  Persistent AFib - on eliquis, rate controlled  Chronic lower back pain    - PT/OT placement, uses roll-a-tor at baseline.  - pain control  - ok for NO tele    Constipation  -Start bowel regimen moderate stool burden seen on CT scan    Bilateral hydroureteronephrosis  - Renal function is stable GFR 56, K4.1, monitor and trend labs    Right ear skin infection - on bactrim received 1st dose this morning  RLS / LLE calf cramps  [place]     VTE Prophylaxis: cont Eliquis    Mae Acosta, APRN-CNP    HISTORY OF PRESENT ILLNESS:   Chief Complaint: Mechanical fall    History Of Present Illness:    Mary Mims is a 89 y.o. female with a significant past medical history of atypical hypertrophic cardiomyopathy, dysphagia, GERD, HLD, HTN, CKD 3 primary malignant neoplasm of bladder presenting to [facility] s/p mechanical fall.    Earlier today patient was walking in [employer] uses rollator at baseline does not usually ambulate long distances.  She became tired attempted to sit and fell onto her butt.  She is complaining of severe lower back pain left lower extremity cramping of the calf, otherwise is comfortable while not moving.  Son is at bedside to assist with history taking and will bring in med list.  She is hard of hearing and son took patient's hearing aids home as they need charged but will bring them back tomorrow.  Patient has known history of dysphagia watch for aspiration with mealtime.  Her only complaint is severe lower back pain with movement, palpation to right 11th and 12th ribs because a fair amount of pain, no shortness of breath, hypoxia or increased work of breathing.    CT chest shows acute fractures of 11th and 12th right ribs, no pneumo, 4 mm pulmonary nodule RUL, dominant nodule left lobe thyroid gland nonemergent thyroid ultrasound can be  obtained.  There is mild to moderate right hydroureteronephrosis and mild left hydroureteronephrosis, moderate stool burden and degenerative changes of the lumbar spine.      VSS ready for admission under general medicine for weakness and debility, s/p mechanical fall, PT OT eval placement.    Review of systems: 10 point review of systems is otherwise negative except as mentioned above.    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    Family History:  Family History[2]     Allergies:  Phenazopyridine    OBJECTIVE:       Last Recorded Vitals:  Vitals:    07/09/25 1845 07/09/25 1945 07/09/25 2000 07/09/25 2200   BP: 165/76 (!) 191/89     BP Location: Right arm      Patient Position: Lying      Pulse: 72 70 74 70   Resp: 18      Temp:       TempSrc:       SpO2: 98% 100% 98% 99%   Weight:       Height:           Last I/O:  No intake/output data recorded.    Physical Exam  Vitals and nursing note reviewed.   Constitutional:       Appearance: Normal appearance. She is ill-appearing.   HENT:      Head: Normocephalic and atraumatic.      Ears:      Comments: [place], family took hearing aids home     Nose: Nose normal.      Mouth/Throat:      Mouth: Mucous  membranes are dry.      Pharynx: Oropharynx is clear.   Eyes:      Extraocular Movements: Extraocular movements intact.      Conjunctiva/sclera: Conjunctivae normal.      Pupils: Pupils are equal, round, and reactive to light.   Cardiovascular:      Rate and Rhythm: Regular rhythm.      Pulses: Normal pulses.      Heart sounds: Normal heart sounds.      Comments: Hx Afib; not on tele unknown underlying rhythm  Pulmonary:      Effort: Pulmonary effort is normal.      Breath sounds: Normal breath sounds.      Comments: RA; multiple rib fractures  Abdominal:      General: Abdomen is flat. Bowel sounds are normal.      Palpations: Abdomen is soft.   Genitourinary:     Comments: Not assessed  Musculoskeletal:      Cervical back: Normal range of motion and neck supple.      Comments: LLE with calf cramping   Skin:     General: Skin is warm and dry.      Capillary Refill: Capillary refill takes 2 to 3 seconds.   Neurological:      General: No focal deficit present.      Mental Status: She is alert and oriented to person, place, and time. Mental status is at baseline.      Motor: Weakness present.   Psychiatric:         Mood and Affect: Mood normal.         Behavior: Behavior normal.         Thought Content: Thought content normal.           Scheduled Medications  Scheduled Medications[3]  PRN Medications  PRN Medications[4]  Continuous Medications  Continuous Medications[5]    Outpatient Medications:  Prior to Admission medications    Medication Sig Start Date End Date Taking? Authorizing Provider   apixaban (Eliquis) 2.5 mg tablet Take 1 tablet (2.5 mg) by mouth twice a day. 3/11/25 9/7/25 Yes Historical Provider, MD   calcium carbonate (Tums) 250 mg ([place] 100 mg) chewable split tablet Take 2 half tablet (500 mg) by mouth once daily as needed for indigestion or heartburn. 7/7/25  Yes Historical Provider, MD   hyoscyamine 0.125 mg SL tablet Place 1 tablet (0.125 mg) under the tongue 3 times a day before meals. 4/10/25  Yes  Historical Provider, MD   losartan (Cozaar) 25 mg tablet Take 1 tablet (25 mg) by mouth once daily. 5/1/25  Yes Historical Provider, MD   losartan (Cozaar) 50 mg tablet Take 1 tablet (50 mg) by mouth 2 times a day.    Historical Provider, MD       LABS AND IMAGING:     Labs:  Results for orders placed or performed during the hospital encounter of 07/09/25 (from the past 24 hours)   CBC and Auto Differential   Result Value Ref Range    WBC 8.0 4.4 - 11.3 x10*3/uL    nRBC 0.0 0.0 - 0.0 /100 WBCs    RBC 3.91 (L) 4.00 - 5.20 x10*6/uL    Hemoglobin 12.5 12.0 - 16.0 g/dL    Hematocrit 38.4 36.0 - 46.0 %    MCV 98 80 - 100 fL    MCH 32.0 26.0 - 34.0 pg    MCHC 32.6 32.0 - 36.0 g/dL    RDW 14.2 11.5 - 14.5 %    Platelets 191 150 - 450 x10*3/uL    Neutrophils % 60.9 40.0 - 80.0 %    Immature Granulocytes %, Automated 0.5 0.0 - 0.9 %    Lymphocytes % 28.2 13.0 - 44.0 %    Monocytes % 8.9 2.0 - 10.0 %    Eosinophils % 0.9 0.0 - 6.0 %    Basophils % 0.6 0.0 - 2.0 %    Neutrophils Absolute 4.86 1.60 - 5.50 x10*3/uL    Immature Granulocytes Absolute, Automated 0.04 0.00 - 0.50 x10*3/uL    Lymphocytes Absolute 2.25 0.80 - 3.00 x10*3/uL    Monocytes Absolute 0.71 0.05 - 0.80 x10*3/uL    Eosinophils Absolute 0.07 0.00 - 0.40 x10*3/uL    Basophils Absolute 0.05 0.00 - 0.10 x10*3/uL   Comprehensive metabolic panel   Result Value Ref Range    Glucose 113 (H) 74 - 99 mg/dL    Sodium 136 136 - 145 mmol/L    Potassium 4.1 3.5 - 5.3 mmol/L    Chloride 102 98 - 107 mmol/L    Bicarbonate 28 21 - 32 mmol/L    Anion Gap 10 10 - 20 mmol/L    Urea Nitrogen 22 6 - 23 mg/dL    Creatinine 0.97 0.50 - 1.05 mg/dL    eGFR 56 (L) >60 mL/min/1.73m*2    Calcium 9.0 8.6 - 10.3 mg/dL    Albumin 3.4 3.4 - 5.0 g/dL    Alkaline Phosphatase 58 33 - 136 U/L    Total Protein 5.5 (L) 6.4 - 8.2 g/dL    AST 20 9 - 39 U/L    Bilirubin, Total 0.4 0.0 - 1.2 mg/dL    ALT 29 7 - 45 U/L        Imaging:  CT lumbar spine retrospective reconstruction protocol   Final Result    CHEST/THORACIC SPINE :   1.  Acute fractures at the 11th and 12th right ribs. No pneumothorax.   Mild bibasilar atelectasis.   2. 4 mm pulmonary nodule at the right upper lobe.   3. Dominant nodule at the left lobe of the thyroid gland. Nonemergent   thyroid ultrasound can be obtained for further evaluation.   4.  No acute fracture of the thoracic spine. Degenerative changes.        ABDOMEN - PELVIS/LUMBAR SPINE:   1.  No acute posttraumatic findings within the abdomen or pelvis.   2. Mild-moderate right hydroureteronephrosis. Mild left   hydroureteronephrosis.   3. Colonic diverticulosis. Moderate amount of stool throughout the   colon.   4.  No acute fracture of the lumbar spine. Degenerative changes.             MACRO:   Incidental Finding:  A non-calcified pulmonary nodule/multiple   non-calcified pulmonary nodules measuring less than 6 mm, likely   benign.  (**-YCF-**)        Instructions:  No further follow-up is required, however, if the   patient has high risk factors for primary lung malignancy, follow-up   noncontrast CT scan chest in 12 months may be obtained. (Gildardo Betancur et al., Guidelines for management of incidental pulmonary   nodules detected on CT images: From the Fleischner Society 2017,   Radiology. 2017 Jul;284 (1):228-243.) [email or web address]             Signed by: Sulema Clark 7/9/2025 8:09 PM   Dictation workstation:   [id number]      CT chest abdomen pelvis w IV contrast   Final Result   CHEST/THORACIC SPINE :   1.  Acute fractures at the 11th and 12th right ribs. No pneumothorax.   Mild bibasilar atelectasis.   2. 4 mm pulmonary nodule at the right upper lobe.   3. Dominant nodule at the left lobe of the thyroid gland. Nonemergent   thyroid ultrasound can be obtained for further evaluation.   4.  No acute fracture of the thoracic spine. Degenerative changes.        ABDOMEN - PELVIS/LUMBAR SPINE:   1.  No acute posttraumatic findings within the abdomen or pelvis.   2.  Mild-moderate right hydroureteronephrosis. Mild left   hydroureteronephrosis.   3. Colonic diverticulosis. Moderate amount of stool throughout the   colon.   4.  No acute fracture of the lumbar spine. Degenerative changes.             MACRO:   Incidental Finding:  A non-calcified pulmonary nodule/multiple   non-calcified pulmonary nodules measuring less than 6 mm, likely   benign.  (**-YCF-**)        Instructions:  No further follow-up is required, however, if the   patient has high risk factors for primary lung malignancy, follow-up   noncontrast CT scan chest in 12 months may be obtained. (Gildardo Betancur et al., Guidelines for management of incidental pulmonary   nodules detected on CT images: From the Fleischner Society 2017,   Radiology. 2017 Jul;284 (1):228-243.) [email or web address]             Signed by: Sulema Clark 7/9/2025 8:09 PM   Dictation workstation:   [id number]      CT thoracic spine retrospective reconstruction protocol   Final Result   CHEST/THORACIC SPINE :   1.  Acute fractures at the 11th and 12th right ribs. No pneumothorax.   Mild bibasilar atelectasis.   2. 4 mm pulmonary nodule at the right upper lobe.   3. Dominant nodule at the left lobe of the thyroid gland. Nonemergent   thyroid ultrasound can be obtained for further evaluation.   4.  No acute fracture of the thoracic spine. Degenerative changes.        ABDOMEN - PELVIS/LUMBAR SPINE:   1.  No acute posttraumatic findings within the abdomen or pelvis.   2. Mild-moderate right hydroureteronephrosis. Mild left   hydroureteronephrosis.   3. Colonic diverticulosis. Moderate amount of stool throughout the   colon.   4.  No acute fracture of the lumbar spine. Degenerative changes.             MACRO:   Incidental Finding:  A non-calcified pulmonary nodule/multiple   non-calcified pulmonary nodules measuring less than 6 mm, likely   benign.  (**-YCF-**)        Instructions:  No further follow-up is required, however, if the   patient has  high risk factors for primary lung malignancy, follow-up   noncontrast CT scan chest in 12 months may be obtained. (Gildardo Betancur et al., Guidelines for management of incidental pulmonary   nodules detected on CT images: From the Fleischner Society 2017,   Radiology. 2017 Jul;284 (1):228-243.) [email or web address]             Signed by: Sulema Clark 7/9/2025 8:09 PM   Dictation workstation:   [id number]      CT head wo IV contrast   Final Result   1.  No acute intracranial hemorrhage or depressed calvarial fracture.   2.  No acute fracture at the cervical spine. Degenerative changes.                  MACRO:   None.        Signed by: Sulema Clark 7/9/2025 7:48 PM   Dictation workstation:   [id number]      CT cervical spine wo IV contrast   Final Result   1.  No acute intracranial hemorrhage or depressed calvarial fracture.   2.  No acute fracture at the cervical spine. Degenerative changes.                  MACRO:   None.        Signed by: Sulema Clark 7/9/2025 7:48 PM   Dictation workstation:   [id number]         Presenting to [facility] s/p mechanical fall         [1] History reviewed. No pertinent surgical history.  [2] No family history on file.  [3] [4] [5]

## 2025-07-11 VITALS
HEART RATE: 62 BPM | OXYGEN SATURATION: 96 % | WEIGHT: 130 LBS | SYSTOLIC BLOOD PRESSURE: 118 MMHG | BODY MASS INDEX: 23.92 KG/M2 | TEMPERATURE: 98.2 F | HEIGHT: 62 IN | RESPIRATION RATE: 18 BRPM | DIASTOLIC BLOOD PRESSURE: 58 MMHG

## 2025-07-11 LAB — HOLD SPECIMEN: NORMAL

## 2025-07-11 PROCEDURE — 2500000002 HC RX 250 W HCPCS SELF ADMINISTERED DRUGS (ALT 637 FOR MEDICARE OP, ALT 636 FOR OP/ED): Performed by: NURSE PRACTITIONER

## 2025-07-11 PROCEDURE — 2500000004 HC RX 250 GENERAL PHARMACY W/ HCPCS (ALT 636 FOR OP/ED): Mod: JW | Performed by: REGISTERED NURSE

## 2025-07-11 PROCEDURE — 97530 THERAPEUTIC ACTIVITIES: CPT | Mod: GP,CQ

## 2025-07-11 PROCEDURE — 99239 HOSP IP/OBS DSCHRG MGMT >30: CPT | Performed by: STUDENT IN AN ORGANIZED HEALTH CARE EDUCATION/TRAINING PROGRAM

## 2025-07-11 PROCEDURE — 2500000001 HC RX 250 WO HCPCS SELF ADMINISTERED DRUGS (ALT 637 FOR MEDICARE OP): Performed by: NURSE PRACTITIONER

## 2025-07-11 PROCEDURE — 2500000001 HC RX 250 WO HCPCS SELF ADMINISTERED DRUGS (ALT 637 FOR MEDICARE OP): Performed by: STUDENT IN AN ORGANIZED HEALTH CARE EDUCATION/TRAINING PROGRAM

## 2025-07-11 RX ORDER — DOCUSATE SODIUM 100 MG/1
100 CAPSULE, LIQUID FILLED ORAL 2 TIMES DAILY
Start: 2025-07-11

## 2025-07-11 RX ORDER — METOPROLOL TARTRATE 25 MG/1
12.5 TABLET, FILM COATED ORAL 2 TIMES DAILY
Start: 2025-07-11

## 2025-07-11 RX ORDER — CYCLOBENZAPRINE HYDROCHLORIDE 7.5 MG/1
7.5 TABLET, FILM COATED ORAL 3 TIMES DAILY
Start: 2025-07-11

## 2025-07-11 RX ORDER — TRAMADOL HYDROCHLORIDE 50 MG/1
50 TABLET, FILM COATED ORAL EVERY 6 HOURS PRN
Qty: 10 TABLET | Refills: 0 | Status: SHIPPED | OUTPATIENT
Start: 2025-07-11

## 2025-07-11 RX ORDER — METOPROLOL TARTRATE 25 MG/1
12.5 TABLET, FILM COATED ORAL 2 TIMES DAILY
Status: DISCONTINUED | OUTPATIENT
Start: 2025-07-11 | End: 2025-07-11 | Stop reason: HOSPADM

## 2025-07-11 RX ADMIN — CYCLOBENZAPRINE 7.5 MG: 5 TABLET, FILM COATED ORAL at 09:37

## 2025-07-11 RX ADMIN — APIXABAN 2.5 MG: 5 TABLET, FILM COATED ORAL at 09:34

## 2025-07-11 RX ADMIN — METOPROLOL TARTRATE 25 MG: 25 TABLET, FILM COATED ORAL at 09:34

## 2025-07-11 RX ADMIN — KETOROLAC TROMETHAMINE 15 MG: 30 INJECTION, SOLUTION INTRAMUSCULAR at 12:02

## 2025-07-11 RX ADMIN — PREGABALIN 25 MG: 25 CAPSULE ORAL at 15:41

## 2025-07-11 RX ADMIN — KETOROLAC TROMETHAMINE 15 MG: 30 INJECTION, SOLUTION INTRAMUSCULAR at 05:48

## 2025-07-11 RX ADMIN — SULFAMETHOXAZOLE AND TRIMETHOPRIM 1 TABLET: 800; 160 TABLET ORAL at 09:34

## 2025-07-11 RX ADMIN — PREGABALIN 25 MG: 25 CAPSULE ORAL at 09:34

## 2025-07-11 RX ADMIN — PANTOPRAZOLE SODIUM 40 MG: 40 TABLET, DELAYED RELEASE ORAL at 05:48

## 2025-07-11 RX ADMIN — OXYBUTYNIN CHLORIDE 5 MG: 5 TABLET ORAL at 09:34

## 2025-07-11 RX ADMIN — DOCUSATE SODIUM 100 MG: 100 CAPSULE, LIQUID FILLED ORAL at 09:34

## 2025-07-11 RX ADMIN — CYCLOBENZAPRINE 7.5 MG: 5 TABLET, FILM COATED ORAL at 15:41

## 2025-07-11 ASSESSMENT — COGNITIVE AND FUNCTIONAL STATUS - GENERAL
MOVING FROM LYING ON BACK TO SITTING ON SIDE OF FLAT BED WITH BEDRAILS: A LITTLE
TOILETING: A LITTLE
MOBILITY SCORE: 15
WALKING IN HOSPITAL ROOM: A LITTLE
STANDING UP FROM CHAIR USING ARMS: A LITTLE
CLIMB 3 TO 5 STEPS WITH RAILING: A LITTLE
DRESSING REGULAR LOWER BODY CLOTHING: A LITTLE
MOVING TO AND FROM BED TO CHAIR: A LITTLE
WALKING IN HOSPITAL ROOM: A LITTLE
CLIMB 3 TO 5 STEPS WITH RAILING: TOTAL
HELP NEEDED FOR BATHING: A LITTLE
DAILY ACTIVITIY SCORE: 21
TURNING FROM BACK TO SIDE WHILE IN FLAT BAD: A LOT
MOBILITY SCORE: 22

## 2025-07-11 ASSESSMENT — PAIN SCALES - GENERAL
PAINLEVEL_OUTOF10: 0 - NO PAIN

## 2025-07-11 ASSESSMENT — PAIN - FUNCTIONAL ASSESSMENT
PAIN_FUNCTIONAL_ASSESSMENT: 0-10

## 2025-07-11 NOTE — CARE PLAN
The patient's goals for the shift include Labs WNL    The clinical goals for the shift include Labs WNL      Problem: Pain - Adult  Goal: Verbalizes/displays adequate comfort level or baseline comfort level  Outcome: Adequate for Discharge     Problem: Safety - Adult  Goal: Free from fall injury  Outcome: Adequate for Discharge     Problem: Discharge Planning  Goal: Discharge to home or other facility with appropriate resources  Outcome: Adequate for Discharge     Problem: Chronic Conditions and Co-morbidities  Goal: Patient's chronic conditions and co-morbidity symptoms are monitored and maintained or improved  Outcome: Adequate for Discharge     Problem: Nutrition  Goal: Nutrient intake appropriate for maintaining nutritional needs  Outcome: Adequate for Discharge     Problem: Skin  Goal: Participates in plan/prevention/treatment measures  7/11/2025 1618 by Mae Palma RN  Outcome: Adequate for Discharge  7/11/2025 0941 by Mae Palma RN  Flowsheets (Taken 7/11/2025 0941)  Participates in plan/prevention/treatment measures:   Increase activity/out of bed for meals   Discuss with provider PT/OT consult   Elevate heels  Goal: Prevent/manage excess moisture  7/11/2025 1618 by Mae Palma RN  Outcome: Adequate for Discharge  7/11/2025 0941 by Mae Palma RN  Flowsheets (Taken 7/11/2025 0941)  Prevent/manage excess moisture:   Use wicking fabric (obtain order)   Moisturize dry skin   Cleanse incontinence/protect with barrier cream   Monitor for/manage infection if present   Follow provider orders for dressing changes  Goal: Prevent/minimize sheer/friction injuries  7/11/2025 1618 by Mae Palma RN  Outcome: Adequate for Discharge  7/11/2025 0941 by Mae Palma RN  Flowsheets (Taken 7/11/2025 0941)  Prevent/minimize sheer/friction injuries:   Use pull sheet   Turn/reposition every 2 hours/use positioning/transfer devices   Increase activity/out of bed for meals   HOB 30 degrees  or less   Complete micro-shifts as needed if patient unable. Adjust patient position to relieve pressure points, not a full turn   Utilize specialty bed per algorithm  Goal: Promote/optimize nutrition  7/11/2025 1618 by Mae Palma RN  Outcome: Adequate for Discharge  7/11/2025 0941 by Mae Palma RN  Flowsheets (Taken 7/11/2025 0941)  Promote/optimize nutrition:   Offer water/supplements/favorite foods   Discuss with provider if NPO > 2 days   Assist with feeding   Reassess MST if dietician not consulted   Monitor/record intake including meals   Consume > 50% meals/supplements  Goal: Promote skin healing  7/11/2025 1618 by Mae Palma RN  Outcome: Adequate for Discharge  7/11/2025 0941 by Mae Palma RN  Flowsheets (Taken 7/11/2025 0941)  Promote skin healing:   Turn/reposition every 2 hours/use positioning/transfer devices   Rotate device position/do not position patient on device   Protective dressings over bony prominences   Ensure correct size (line/device) and apply per  instructions   Assess skin/pad under line(s)/device(s)     Problem: Pain  Goal: Takes deep breaths with improved pain control throughout the shift  Outcome: Adequate for Discharge  Goal: Turns in bed with improved pain control throughout the shift  Outcome: Adequate for Discharge  Goal: Walks with improved pain control throughout the shift  Outcome: Adequate for Discharge  Goal: Performs ADL's with improved pain control throughout shift  Outcome: Adequate for Discharge  Goal: Participates in PT with improved pain control throughout the shift  Outcome: Adequate for Discharge  Goal: Free from opioid side effects throughout the shift  Outcome: Adequate for Discharge  Goal: Free from acute confusion related to pain meds throughout the shift  Outcome: Adequate for Discharge

## 2025-07-11 NOTE — PROGRESS NOTES
[facility] pt.  Hospital ins. Team to obtain precert/anthem medicare adv.    Update:  ins. Precert has been obtained and good through 7/17. Physician aware, will await final discharge orders.    Update:  pt. Will discharge this date  to [facility] at 4:00 pm via ambulance.  Spoke with son who is aware and in agreement to transfer.

## 2025-07-11 NOTE — NURSING NOTE
[facility] [phone] called report to Dominic GALLARDO with all questions and concerns answered. ETA  is 1600.

## 2025-07-11 NOTE — CARE PLAN
The patient's goals for the shift include      The clinical goals for the shift include Pain control      Problem: Pain - Adult  Goal: Verbalizes/displays adequate comfort level or baseline comfort level  Outcome: Progressing     Problem: Safety - Adult  Goal: Free from fall injury  Outcome: Progressing     Problem: Discharge Planning  Goal: Discharge to home or other facility with appropriate resources  Outcome: Progressing     Problem: Chronic Conditions and Co-morbidities  Goal: Patient's chronic conditions and co-morbidity symptoms are monitored and maintained or improved  Outcome: Progressing

## 2025-07-11 NOTE — DISCHARGE SUMMARY
Discharge Diagnosis  Weakness, recurrent falls  Persistent A-fib    Issues Requiring Follow-Up  Outpatient follow-up with primary care as needed    Discharge Meds     Medication List      START taking these medications     cyclobenzaprine 7.5 mg tablet; Commonly known as: Fexmid; Take 1 tablet   (7.5 mg) by mouth 3 times a day.   docusate sodium 100 mg capsule; Commonly known as: Colace; Take 1   capsule (100 mg) by mouth 2 times a day.   traMADol 50 mg tablet; Commonly known as: Ultram; Take 1 tablet (50 mg)   by mouth every 6 hours if needed for severe pain (7 - 10).     CHANGE how you take these medications     metoprolol tartrate 25 mg tablet; Commonly known as: Lopressor; Take 0.5   tablets (12.5 mg) by mouth 2 times a day.; What changed: how much to take,   additional instructions     CONTINUE taking these medications     calcium carbonate 250 mg ([place] 100 mg) chewable split tablet; Commonly   known as: Tums   Eliquis 2.5 mg tablet; Generic drug: apixaban   hyoscyamine 0.125 mg disintegrating tablet; Commonly known as: Anaspaz   losartan 25 mg tablet; Commonly known as: Cozaar   mupirocin 2 % ointment; Commonly known as: Bactroban   oxyBUTYnin 5 mg tablet; Commonly known as: Ditropan   pantoprazole 40 mg EC tablet; Commonly known as: ProtoNix   pravastatin 40 mg tablet; Commonly known as: Pravachol   pregabalin 25 mg capsule; Commonly known as: Lyrica   sulfamethoxazole-trimethoprim 800-160 mg tablet; Commonly known as:   Bactrim DS       Test Results Pending At Discharge  Pending Labs       No current pending labs.            Hospital Course    Mechanical fall d/t weakness and debility  Multiple rib fractures - Cleared by trauma  Persistent AFib - on eliquis, rate controlled  Chronic lower back pain  - Patient was placed on fall precaution, pain management     Constipation  - Continued bowel regimen     Bilateral hydroureteronephrosis  - Monitored renal function, avoided nephrotoxic agent     Right ear skin  infection   - continued Bactrim     RLS / LLE calf cramps: continued home meds  [place]      VTE Prophylaxis: Eliquis ordered   PT/OT recommended moderate intensity on discharge      Currently patient is hemodynamically stable and ready for discharge to SNF for better recovery.  She will be continued on adjusted dose of blood pressure meds and rest of her home medications.  She will follow-up as outpatient with her primary care as needed.  Patient had an episode of A-fib during this hospital admit which was controlled with rate control meds.      Pertinent Physical Exam At Time of Discharge  Physical Exam    General: Well-developed elderly frail female, in no acute distress  HEENT: AT, NC, no JVD, no lymphadenopathy, neck supple  Lungs: Clear, no wheezing, no crackles  Cardiac: Normal S1-S2, no murmur, no gallop  Abdomen: Soft, nontender, no distention, positive bowel sound  Extremities: No deformity, no edema, pulses intact, ROM limited due to frailty  Neurological: Alert awake oriented x3, sensation intact, clear speech        Time spent 35 minutes  Bob Hayes MD

## 2025-07-11 NOTE — PROGRESS NOTES
Physical Therapy    Physical Therapy Treatment    Patient Name: Mary Mims  MRN: [id number]  Today's Date: 7/11/2025  Time Calculation  Start Time: 0939  Stop Time: 1005  Time Calculation (min): 26 min     1023/1023-B    Assessment/Plan   PT Assessment  PT Assessment Results: Decreased strength, Decreased endurance, Impaired balance, Decreased mobility  Rehab Prognosis: Good  Barriers to Discharge Home: Caregiver assistance, Physical needs  Caregiver Assistance: Caregiver assistance needed per identified barriers - however, level of patient's required assistance exceeds assistance available at home  Physical Needs: Stair navigation into home limited by function/safety, Ambulating household distances limited by function/safety, High falls risk due to function or environment  Treatment Tolerance: Patient tolerated treatment well, Patient limited by fatigue  Medical Staff Made Aware: Yes  Strengths: Living arrangement secure  Barriers to Participation: Comorbidities  End of Session Communication: Bedside nurse, PCT/NA/CTA  Assessment Comment: Patient continues to require (A) for safety with bed mobility/transfers/amb. Patient will benefit from additional PT to address deficits and improve mobility.  End of Session Patient Position: Up in chair, Alarm on (Reclined in chair with LEs elevated; Call light, phone, and tray table within reach.)  PT Plan  Inpatient/Swing Bed or Outpatient: Inpatient  Treatment/Interventions: Bed mobility, Transfer training, Gait training, Strengthening, Therapeutic exercise  PT Plan: Ongoing PT  PT Frequency: 3 times per week (during acute, inpatient hospitalization)  PT Discharge Recommendations: Moderate intensity level of continued care (Based on current functional status and rehab potential, patient is anticipated to tolerate and benefit from 5 or more days per week of skilled rehabilitative therapy after discharge from this acute inpatient hospitalization.)    PT Recommended  Transfer Status: Assist x1, ww+gait belt    General Visit Information:   PT  Visit  PT Received On: 07/11/25  General  Family/Caregiver Present: No  Prior to Session Communication: Bedside nurse, PCT/NA/CTA  Patient Position Received: Bed, 3 rail up, Alarm on  General Comment: Pleasant and cooperative.    General Observations:   General Observation: 2L O2 via NC--->RA; Tele; Purewick; Alarm.    Subjective     Precautions:  Precautions  Hearing/Visual Limitations: [place]; Wears glasses.  Medical Precautions: Fall precautions  Precautions Comment: Per EMR: High fall risk    Vital Signs:  Vital Signs  Heart Rate:  (60-67bpm.)  SpO2:  (2L O2 via NC. SPO2 98-99%. Trial on RA 2° pt states she does not have home O2. SPO2 95-98% on RA with activities. No c/o SOB. Nursing informed of readings and ok with pt keeping NC off at the end of session. Nursing to continue monitoring pt.)    Objective     Pain:  Pain Assessment  Pain Assessment: 0-10  0-10 (Numeric) Pain Score: 0 - No pain    Cognition:  Cognition  Overall Cognitive Status: Within Functional Limits    Balance:   Static Sitting Balance  Static Sitting-Comment/Number of Minutes: G  Dynamic Sitting Balance  Dynamic Sitting-Comments: G  Static Standing Balance  Static Standing-Comment/Number of Minutes: F with ww  Dynamic Standing Balance  Dynamic Standing-Comments: F/F- with ww    Treatments:        Balance/Neuromuscular Re-Education  Balance/Neuromuscular Re-Education Activity Performed: Yes  Balance/Neuromuscular Re-Education Activity 1: Multi-level dynamic reaching within arms length in seated/standing positions(L/R/C/Above head/Below waist/Across midline) with x1UE support. No LOB, but moves cautiously with all tasks.  Bed Mobility  Bed Mobility: Yes  Bed Mobility 1  Bed Mobility 1: Supine to sitting  Level of Assistance 1: Minimum assistance  Bed Mobility Comments 1: HOB ~30°. Hand over hand (A) to reach across body to use the bed rail for support. (A) to lift UB from  HOB while moving LEs over the EOB. No c/o dizziness with positional change.  Ambulation/Gait Training  Ambulation/Gait Training Performed: Yes  Ambulation/Gait Training 1  Surface 1: Level tile  Device 1: No device, Rolling walker  Gait Support Devices: Gait belt  Assistance 1: Minimum assistance  Comments/Distance (ft) 1: ~3ft x2 bed to BSC to chair without AD and ~30ft x2 with ww. Patient completed transfers at bedside without AD; used arm rests for UE support since ww did not fit in narrow spacing. Patient agreeable to using ww for amb. Slow [place]. Slightly forward flexed posture. Step through gait pattern. Decreased step height/length B. v/c and (A) for safer maneuvering of ww with 90/180° turns and through narrow pathways. No LOB. Increased fatigue reported with increased activity.  Transfers  Transfer: Yes  Transfer 1  Technique 1: Sit to stand, Stand to sit  Transfer Device 1: Gait belt (ww)  Transfer Level of Assistance 1: Minimum assistance  Trials/Comments 1: (3x). v/c for safe hand placement and technique. Slow transition of hands to/from ww. Benefits from elevated surfaces.             Outcome Measures:     [facility] Basic Mobility  Turning from your back to your side while in a flat bed without using bedrails: A little  Moving from lying on your back to sitting on the side of a flat bed without using bedrails: A lot  Moving to and from bed to chair (including a wheelchair): A little  Standing up from a chair using your arms (e.g. wheelchair or bedside chair): A little  To walk in hospital room: A little  Climbing 3-5 steps with railing: Total  Basic Mobility - Total Score: 15                                      Education Documentation  Mobility Training, taught by Dinora Madsen PTA at 7/11/2025  1:36 PM.  Learner: Patient  Readiness: Acceptance  Method: Explanation, Demonstration, Teach-back  Response: Verbalizes Understanding, Needs Reinforcement  Comment: See therapy note.              EDUCATION:  Individual(s) Educated: Patient  Education Provided: Body Mechanics, Fall Risk, Home Safety, POC, Posture (Safety with bed mobility/transfers/amb.)  Patient Response to Education: Patient/Caregiver Verbalized Understanding of Information, Patient/Caregiver Performed Return Demonstration of Exercises/Activities, Patient/Caregiver Asked Appropriate Questions    Encounter Problems       Encounter Problems (Active)       PT Problem       Pt. will transfer supine/sit with CGA (Progressing)       Start:  07/10/25    Expected End:  07/24/25            Pt. will transfer sit/stand with FWW with CGA (Progressing)       Start:  07/10/25    Expected End:  07/24/25            Pt.will ambulate 40' with FWW with MIN A x 1 (Progressing)       Start:  07/10/25    Expected End:  07/24/25            Pt. will perform 2 x 15 B LE AROM exercises  (Not Progressing)       Start:  07/10/25    Expected End:  07/24/25

## 2025-07-11 NOTE — NURSING NOTE
[facility] at bedside to transport patient to [facility]. All personal belongings left with patient.

## 2025-07-15 ENCOUNTER — OFFICE VISIT (OUTPATIENT)
Dept: GERIATRIC MEDICINE | Age: 89
End: 2025-07-15

## 2025-07-15 DIAGNOSIS — I48.19 PERSISTENT ATRIAL FIBRILLATION (HCC): ICD-10-CM

## 2025-07-15 DIAGNOSIS — N39.0 RECURRENT UTI: ICD-10-CM

## 2025-07-15 DIAGNOSIS — E78.5 HYPERLIPIDEMIA, UNSPECIFIED HYPERLIPIDEMIA TYPE: ICD-10-CM

## 2025-07-15 DIAGNOSIS — I10 PRIMARY HYPERTENSION: ICD-10-CM

## 2025-07-15 DIAGNOSIS — M48.07 LUMBOSACRAL STENOSIS: Primary | ICD-10-CM

## 2025-07-15 DIAGNOSIS — M15.9 OSTEOARTHRITIS OF MULTIPLE JOINTS, UNSPECIFIED OSTEOARTHRITIS TYPE: ICD-10-CM

## 2025-07-15 RX ORDER — ANTACID TABLETS 500 MG/1
500 TABLET, CHEWABLE ORAL DAILY PRN
COMMUNITY

## 2025-07-15 RX ORDER — LOSARTAN POTASSIUM 25 MG/1
25 TABLET ORAL DAILY
COMMUNITY

## 2025-07-15 RX ORDER — PREGABALIN 25 MG/1
25 CAPSULE ORAL 3 TIMES DAILY
COMMUNITY

## 2025-07-15 RX ORDER — OXYBUTYNIN CHLORIDE 5 MG/1
5 TABLET ORAL 2 TIMES DAILY
COMMUNITY

## 2025-07-15 RX ORDER — CYCLOBENZAPRINE HYDROCHLORIDE 7.5 MG/1
7.5 TABLET, FILM COATED ORAL 3 TIMES DAILY PRN
COMMUNITY

## 2025-07-15 RX ORDER — HYOSCYAMINE SULFATE 0.125 MG
0.12 TABLET,DISINTEGRATING ORAL
COMMUNITY

## 2025-07-15 RX ORDER — TRAMADOL HYDROCHLORIDE 50 MG/1
50 TABLET ORAL EVERY 6 HOURS PRN
COMMUNITY

## 2025-07-18 ENCOUNTER — OFFICE VISIT (OUTPATIENT)
Dept: GERIATRIC MEDICINE | Age: 89
End: 2025-07-18
Payer: MEDICARE

## 2025-07-18 DIAGNOSIS — R53.1 WEAKNESS GENERALIZED: Primary | ICD-10-CM

## 2025-07-18 DIAGNOSIS — I48.19 PERSISTENT ATRIAL FIBRILLATION (HCC): ICD-10-CM

## 2025-07-18 PROCEDURE — 99315 NF DSCHRG MGMT 30 MIN/LESS: CPT | Performed by: PHYSICIAN ASSISTANT

## 2025-07-21 ASSESSMENT — ENCOUNTER SYMPTOMS
WHEEZING: 0
CHEST TIGHTNESS: 0
BACK PAIN: 1
CHOKING: 0
COUGH: 0
STRIDOR: 0
SHORTNESS OF BREATH: 0

## 2025-07-21 NOTE — PROGRESS NOTES
Subjective:      Patient ID: Rossi Chinchilla is a pleasant 89 y.o. female who presents today for:  Chief Complaint   Patient presents with    Other     Weakness generalized  (primary encounter diagnosis)  Persistent atrial fibrillation (hcc)         [facility]    REASON FOR VISIT: Discharge summary report.    SUBJECTIVE: Patient seen today prior to discharge home.  Patient had fall at home with right multiple rib fractures.  She did make gains in therapy while here, she is stable with her walker in her room today.  She is alert and oriented x 3 with some mild prompting.  She has been maintained on Eliquis for A-fib as well, currently rate controlled per last note.  Her vital signs are stable today.  No evidence of new falls or bleeding.  Per patient no trouble with respirations, negative dyspnea.  Able to take deep breaths without pain to ribs.  Overall patient stable and preferred to discharge home.  Cleared for discharge via PT/OT. Due to patients ongoing chronic weakness, she will need [facility] for support at home for ADL's and monitoring for the time being. To f/u with PCP within 1-2 weeks discharge.      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

## 2025-08-20 ASSESSMENT — ENCOUNTER SYMPTOMS
CONSTIPATION: 1
COUGH: 1

## (undated) DEVICE — SUTURE PERMA-HAND SZ 3-0 L18IN NONABSORBABLE BLK SH-1 L22MM C003D

## (undated) DEVICE — COUNTER NDL 40 COUNT HLD 70 FOAM BLK ADH W/ MAG

## (undated) DEVICE — Device

## (undated) DEVICE — DRAPE,MEDI-SLUSH,STERILE: Brand: MEDLINE

## (undated) DEVICE — TTL1LYR 16FR10ML 100%SIL TMPST TR: Brand: MEDLINE

## (undated) DEVICE — TUBING, SUCTION, 3/16" X 12', STRAIGHT: Brand: MEDLINE

## (undated) DEVICE — DRAPE EQUIP TRNSPRT CONTAINMENT FOR BK TAB

## (undated) DEVICE — GOWN,SIRUS,POLYRNF,BRTHSLV,XLN/XL,20/CS: Brand: MEDLINE

## (undated) DEVICE — ELECTRODE PT RET AD L9FT HI MOIST COND ADH HYDRGEL CORDED

## (undated) DEVICE — SUTURE ABSORBABLE BRAIDED 0 CT-1 8X27 IN UD VICRYL JJ41G

## (undated) DEVICE — APPLICATOR MEDICATED 26 CC SOLUTION HI LT ORNG CHLORAPREP

## (undated) DEVICE — GOWN,AURORA,NONREINFORCED,LARGE: Brand: MEDLINE

## (undated) DEVICE — YANKAUER,BULB TIP,W/O VENT,RIGID,STERILE: Brand: MEDLINE

## (undated) DEVICE — SURGICAL PROCEDURE PACK TISS 3X5 IN ABSORBABLE SEPRAFILM

## (undated) DEVICE — GAUZE,SPONGE,4"X4",16PLY,XRAY,STRL,LF: Brand: MEDLINE

## (undated) DEVICE — LABEL MED MINI W/ MARKER

## (undated) DEVICE — GLOVE ORANGE PI 7 1/2   MSG9075

## (undated) DEVICE — SUTURE PROL SZ 0 L30IN NONABSORBABLE BLU L36MM CT-1 1/2 CIR 8424H

## (undated) DEVICE — BINDER ABD HK  LOOP CLOSURE UNIV 45-62 IN 9 IN WHT PROCARE

## (undated) DEVICE — SINGLE PORT MANIFOLD: Brand: NEPTUNE 2

## (undated) DEVICE — YANKAUER,POOLE TIP,STERILE,50/CS: Brand: MEDLINE

## (undated) DEVICE — CATHETER,URETHRAL,REDRUBBER,STRL,16FR: Brand: MEDLINE

## (undated) DEVICE — NEPTUNE E-SEP SMOKE EVACUATION PENCIL, COATED, 70MM BLADE, PUSH BUTTON SWITCH: Brand: NEPTUNE E-SEP

## (undated) DEVICE — PACK,LAPAROTOMY,NO GOWNS: Brand: MEDLINE

## (undated) DEVICE — TOWEL,OR,DSP,ST,BLUE,STD,4/PK,20PK/CS: Brand: MEDLINE

## (undated) DEVICE — MARKER SURG SKIN GENTIAN VLT REG TIP W/ 6IN RUL

## (undated) DEVICE — SPONGE,LAP,18"X18",DLX,XR,ST,5/PK,40/PK: Brand: MEDLINE

## (undated) DEVICE — SUTURE VCRL SZ 0 L36IN ABSRB UD L36MM CT-1 1/2 CIR J946H